# Patient Record
Sex: FEMALE | Race: WHITE | Employment: FULL TIME | URBAN - METROPOLITAN AREA
[De-identification: names, ages, dates, MRNs, and addresses within clinical notes are randomized per-mention and may not be internally consistent; named-entity substitution may affect disease eponyms.]

---

## 2020-03-25 ENCOUNTER — HOSPITAL ENCOUNTER (OUTPATIENT)
Dept: LAB | Age: 50
Discharge: HOME | End: 2020-03-25

## 2020-04-07 ENCOUNTER — HOSPITAL ENCOUNTER (OUTPATIENT)
Dept: PET IMAGING | Age: 50
Discharge: HOME | End: 2020-04-07
Attending: SPECIALIST
Payer: COMMERCIAL

## 2020-04-07 VITALS — WEIGHT: 115 LBS | BODY MASS INDEX: 20.38 KG/M2 | HEIGHT: 63 IN

## 2020-04-07 DIAGNOSIS — R22.1 NECK MASS: ICD-10-CM

## 2020-04-07 PROCEDURE — A9552 F18 FDG: HCPCS

## 2020-04-07 RX ORDER — SODIUM CHLORIDE 0.9 % (FLUSH) 0.9 %
10 SYRINGE (ML) INJECTION
Status: COMPLETED | OUTPATIENT
Start: 2020-04-07 | End: 2020-04-07

## 2020-04-07 RX ADMIN — Medication 10 ML: at 08:42

## 2020-04-11 ENCOUNTER — ANESTHESIA EVENT (OUTPATIENT)
Dept: SURGERY | Age: 50
End: 2020-04-11
Payer: COMMERCIAL

## 2020-04-13 ENCOUNTER — HOSPITAL ENCOUNTER (OUTPATIENT)
Age: 50
Setting detail: OUTPATIENT SURGERY
Discharge: HOME | End: 2020-04-13
Attending: SPECIALIST | Admitting: SPECIALIST
Payer: COMMERCIAL

## 2020-04-13 ENCOUNTER — ANESTHESIA (OUTPATIENT)
Dept: SURGERY | Age: 50
End: 2020-04-13
Payer: COMMERCIAL

## 2020-04-13 VITALS
RESPIRATION RATE: 15 BRPM | OXYGEN SATURATION: 95 % | HEART RATE: 57 BPM | TEMPERATURE: 98 F | WEIGHT: 116.84 LBS | HEIGHT: 63 IN | BODY MASS INDEX: 20.7 KG/M2 | DIASTOLIC BLOOD PRESSURE: 72 MMHG | SYSTOLIC BLOOD PRESSURE: 115 MMHG

## 2020-04-13 PROBLEM — R59.0 ANTERIOR CERVICAL LYMPHADENOPATHY: Status: ACTIVE | Noted: 2020-04-13

## 2020-04-13 PROCEDURE — 88342 IMHCHEM/IMCYTCHM 1ST ANTB: CPT

## 2020-04-13 PROCEDURE — 76210000020 HC REC RM PH II FIRST 0.5 HR: Performed by: SPECIALIST

## 2020-04-13 PROCEDURE — 76060000032 HC ANESTHESIA 0.5 TO 1 HR: Performed by: SPECIALIST

## 2020-04-13 PROCEDURE — 77030026438 HC STYL ET INTUB CARD -A: Performed by: ANESTHESIOLOGY

## 2020-04-13 PROCEDURE — 76210000006 HC OR PH I REC 0.5 TO 1 HR: Performed by: SPECIALIST

## 2020-04-13 PROCEDURE — 76010000138 HC OR TIME 0.5 TO 1 HR: Performed by: SPECIALIST

## 2020-04-13 PROCEDURE — 74011000250 HC RX REV CODE- 250: Performed by: NURSE ANESTHETIST, CERTIFIED REGISTERED

## 2020-04-13 PROCEDURE — 74011250636 HC RX REV CODE- 250/636: Performed by: NURSE ANESTHETIST, CERTIFIED REGISTERED

## 2020-04-13 PROCEDURE — 77030008684 HC TU ET CUF COVD -B: Performed by: ANESTHESIOLOGY

## 2020-04-13 PROCEDURE — 74011250636 HC RX REV CODE- 250/636: Performed by: ANESTHESIOLOGY

## 2020-04-13 PROCEDURE — 88305 TISSUE EXAM BY PATHOLOGIST: CPT

## 2020-04-13 PROCEDURE — 77030018846 HC SOL IRR STRL H20 ICUM -A: Performed by: SPECIALIST

## 2020-04-13 RX ORDER — GLYCOPYRROLATE 0.2 MG/ML
INJECTION INTRAMUSCULAR; INTRAVENOUS AS NEEDED
Status: DISCONTINUED | OUTPATIENT
Start: 2020-04-13 | End: 2020-04-13 | Stop reason: HOSPADM

## 2020-04-13 RX ORDER — MIDAZOLAM HYDROCHLORIDE 1 MG/ML
INJECTION, SOLUTION INTRAMUSCULAR; INTRAVENOUS AS NEEDED
Status: DISCONTINUED | OUTPATIENT
Start: 2020-04-13 | End: 2020-04-13 | Stop reason: HOSPADM

## 2020-04-13 RX ORDER — DIPHENHYDRAMINE HYDROCHLORIDE 50 MG/ML
12.5 INJECTION, SOLUTION INTRAMUSCULAR; INTRAVENOUS AS NEEDED
Status: DISCONTINUED | OUTPATIENT
Start: 2020-04-13 | End: 2020-04-13 | Stop reason: HOSPADM

## 2020-04-13 RX ORDER — LIDOCAINE HYDROCHLORIDE 10 MG/ML
0.1 INJECTION, SOLUTION EPIDURAL; INFILTRATION; INTRACAUDAL; PERINEURAL AS NEEDED
Status: DISCONTINUED | OUTPATIENT
Start: 2020-04-13 | End: 2020-04-13 | Stop reason: HOSPADM

## 2020-04-13 RX ORDER — SODIUM CHLORIDE, SODIUM LACTATE, POTASSIUM CHLORIDE, CALCIUM CHLORIDE 600; 310; 30; 20 MG/100ML; MG/100ML; MG/100ML; MG/100ML
125 INJECTION, SOLUTION INTRAVENOUS CONTINUOUS
Status: DISCONTINUED | OUTPATIENT
Start: 2020-04-13 | End: 2020-04-13 | Stop reason: HOSPADM

## 2020-04-13 RX ORDER — SUCCINYLCHOLINE CHLORIDE 20 MG/ML
INJECTION INTRAMUSCULAR; INTRAVENOUS AS NEEDED
Status: DISCONTINUED | OUTPATIENT
Start: 2020-04-13 | End: 2020-04-13 | Stop reason: HOSPADM

## 2020-04-13 RX ORDER — NEOSTIGMINE METHYLSULFATE 1 MG/ML
INJECTION, SOLUTION INTRAVENOUS AS NEEDED
Status: DISCONTINUED | OUTPATIENT
Start: 2020-04-13 | End: 2020-04-13 | Stop reason: HOSPADM

## 2020-04-13 RX ORDER — ONDANSETRON 2 MG/ML
INJECTION INTRAMUSCULAR; INTRAVENOUS AS NEEDED
Status: DISCONTINUED | OUTPATIENT
Start: 2020-04-13 | End: 2020-04-13 | Stop reason: HOSPADM

## 2020-04-13 RX ORDER — HYDROMORPHONE HYDROCHLORIDE 1 MG/ML
.25-1 INJECTION, SOLUTION INTRAMUSCULAR; INTRAVENOUS; SUBCUTANEOUS
Status: DISCONTINUED | OUTPATIENT
Start: 2020-04-13 | End: 2020-04-13 | Stop reason: HOSPADM

## 2020-04-13 RX ORDER — NALOXONE HYDROCHLORIDE 0.4 MG/ML
0.2 INJECTION, SOLUTION INTRAMUSCULAR; INTRAVENOUS; SUBCUTANEOUS
Status: DISCONTINUED | OUTPATIENT
Start: 2020-04-13 | End: 2020-04-13 | Stop reason: HOSPADM

## 2020-04-13 RX ORDER — DEXAMETHASONE SODIUM PHOSPHATE 4 MG/ML
INJECTION, SOLUTION INTRA-ARTICULAR; INTRALESIONAL; INTRAMUSCULAR; INTRAVENOUS; SOFT TISSUE AS NEEDED
Status: DISCONTINUED | OUTPATIENT
Start: 2020-04-13 | End: 2020-04-13 | Stop reason: HOSPADM

## 2020-04-13 RX ORDER — PROPOFOL 10 MG/ML
INJECTION, EMULSION INTRAVENOUS AS NEEDED
Status: DISCONTINUED | OUTPATIENT
Start: 2020-04-13 | End: 2020-04-13 | Stop reason: HOSPADM

## 2020-04-13 RX ORDER — FLUMAZENIL 0.1 MG/ML
0.2 INJECTION INTRAVENOUS
Status: DISCONTINUED | OUTPATIENT
Start: 2020-04-13 | End: 2020-04-13 | Stop reason: HOSPADM

## 2020-04-13 RX ORDER — LIDOCAINE HYDROCHLORIDE 20 MG/ML
INJECTION, SOLUTION EPIDURAL; INFILTRATION; INTRACAUDAL; PERINEURAL AS NEEDED
Status: DISCONTINUED | OUTPATIENT
Start: 2020-04-13 | End: 2020-04-13 | Stop reason: HOSPADM

## 2020-04-13 RX ORDER — ROCURONIUM BROMIDE 10 MG/ML
INJECTION, SOLUTION INTRAVENOUS AS NEEDED
Status: DISCONTINUED | OUTPATIENT
Start: 2020-04-13 | End: 2020-04-13 | Stop reason: HOSPADM

## 2020-04-13 RX ORDER — FENTANYL CITRATE 50 UG/ML
INJECTION, SOLUTION INTRAMUSCULAR; INTRAVENOUS AS NEEDED
Status: DISCONTINUED | OUTPATIENT
Start: 2020-04-13 | End: 2020-04-13 | Stop reason: HOSPADM

## 2020-04-13 RX ADMIN — SUCCINYLCHOLINE CHLORIDE 100 MG: 20 INJECTION, SOLUTION INTRAMUSCULAR; INTRAVENOUS; PARENTERAL at 07:47

## 2020-04-13 RX ADMIN — ROCURONIUM BROMIDE 5 MG: 50 INJECTION, SOLUTION INTRAVENOUS at 07:47

## 2020-04-13 RX ADMIN — MIDAZOLAM HYDROCHLORIDE 2 MG: 2 INJECTION, SOLUTION INTRAMUSCULAR; INTRAVENOUS at 07:40

## 2020-04-13 RX ADMIN — FENTANYL CITRATE 50 MCG: 50 INJECTION, SOLUTION INTRAMUSCULAR; INTRAVENOUS at 07:44

## 2020-04-13 RX ADMIN — ROCURONIUM BROMIDE 15 MG: 50 INJECTION, SOLUTION INTRAVENOUS at 07:53

## 2020-04-13 RX ADMIN — FENTANYL CITRATE 50 MCG: 50 INJECTION, SOLUTION INTRAMUSCULAR; INTRAVENOUS at 07:40

## 2020-04-13 RX ADMIN — ONDANSETRON 4 MG: 2 INJECTION INTRAMUSCULAR; INTRAVENOUS at 08:03

## 2020-04-13 RX ADMIN — LIDOCAINE HYDROCHLORIDE 40 MG: 20 INJECTION, SOLUTION INTRAVENOUS at 07:47

## 2020-04-13 RX ADMIN — DEXAMETHASONE SODIUM PHOSPHATE 8 MG: 4 INJECTION, SOLUTION INTRAMUSCULAR; INTRAVENOUS at 08:03

## 2020-04-13 RX ADMIN — SODIUM CHLORIDE, SODIUM LACTATE, POTASSIUM CHLORIDE, AND CALCIUM CHLORIDE: 600; 310; 30; 20 INJECTION, SOLUTION INTRAVENOUS at 08:21

## 2020-04-13 RX ADMIN — PROPOFOL 150 MG: 10 INJECTION, EMULSION INTRAVENOUS at 07:46

## 2020-04-13 RX ADMIN — SODIUM CHLORIDE, SODIUM LACTATE, POTASSIUM CHLORIDE, AND CALCIUM CHLORIDE 125 ML/HR: 600; 310; 30; 20 INJECTION, SOLUTION INTRAVENOUS at 06:27

## 2020-04-13 RX ADMIN — GLYCOPYRROLATE 0.4 MG: 0.2 INJECTION, SOLUTION INTRAMUSCULAR; INTRAVENOUS at 08:16

## 2020-04-13 RX ADMIN — Medication 3 MG: at 08:16

## 2020-04-13 NOTE — DISCHARGE INSTRUCTIONS
Endoscopy Postoperative Instructions      Most patient have very little pain or discomfort from this procedure unless biopsies were taken. IIf this is the case, usually one starts feeling better by day 4 or 5. Below are some important tips to make the recovery as smooth as possible. Never hesitate to call your physician's office if any questions or concerns arise. 1. Drink plenty of fluids. The most important requirement for the patient is to drink plenty of fluids. Milk products should generally be avoided for the first 24 hrs following anesthesia as they may upset the stomach. Water, juice, or Gatorade and soft drinks are generally well tolerated. Avoid citric drinks such as orange or grapefruit juice as they may irritate the throat and increase discomfort. Signs of dehydration include decreased urination (less than 2-3 times per day), and/or dry mouth. Please contact your physician if any concerns arise. 2. Eat soothing foods. For the first 2-3 days after surgery, avoid eating crunchy, coarse, or scratchy foods such as toast, crackers, cookies, chips and pretzels because they may scratch the throat and cause bleeding. Foods that are easy on the throat include applesauce, yogurt, cooked cereals, broths or soups, mashed potatoes and soft fruits. Cold foods such as popsicles, italian ice or frozen yogurt may also be soothing to the throat. Avoid very hot, spicy or acidic foods (ie, tomato sauce, orange juice). For adults, chewing gum can be helpful to alleviate muscle tightness in the jaw and face. 3. Fever. A low-grade fever is not uncommon for several days following a throat surgery. Call your physician if a fever greater than 102 degrees is present over several hours. 4. Bleeding. Small specks of blood or blood-tinged saliva may be seen for several days following the procedure. 5. Pain.   Pain is often mild to moderate for a few days following surgery and is often dependent of the extent of surgery. Expect throat pain to resolve about 4-6 days following surgery. The pain may also be present in the ears, jaw, tongue and neck. Prescription pain medication may be prescribed. If the pain is mild to moderate, acetaminophen (Tylenol) or ibuprofen (Advil, Motrin) may be used instead of the prescription pain medication (if prescribed). Some mild discomfort or pain upon yawning may be seen for several weeks following the procedure. In many cases, aspirin should be avoided for about 5 days after the procedure. 6. Activity. Rest is recommended for the first few days, followed by slowly increased activity. If diet has resumed to near-normal and pain medication is no longer necessary, the patient may return to work or school. Depending on surgery, you may be asked to adhere to voice rest for several days. Discuss this with your surgeon. 7. Gargle with warm salt water. Gargling several times a day with warm salt water may be helpful. Mix 8 ounces of warm water with about 1/4 teaspoon of table salt. 8. Use a humidifier. Adding moisture to the air can be helpful. Keeping the mucous membranes moist can help ease the discomfort following throat surgery. A cool mist humidifier strategically placed near the bedside is ideal.    9. Stay away from irritants. Try to avoid potential irritants such as cigarette smoke, fumes from paints or other chemicals which may not only increase the pain but also delay healing. 10. Miscellaneous. The tongue or uvula may be swollen, red or \"bumpy\" for several days. Also, the voice may be different for a few weeks. Snoring may actually be worse for a week or two until the swelling goes down. Finally, there is often a mild to moderate taste disturbance for a few weeks following the procedure. This generally improves and long-term taste problems are very rare. 11. Follow up.   A follow up appointment should be arranged following surgery. Your surgeon will specify the approximate follow up date. If any problems or concerns arise before that time, please contact the office at [phone].      Debbie Marrero [address]   T: [phone]   Ricardo Martinez: [phone]  [email or web address]         DISCHARGE SUMMARY from your Nurse    The following personal items collected during your admission are returned to you:   Dental Appliance: Dental Appliances: None  Vision: Visual Aid: Glasses  Hearing Aid:    Jewelry: Jewelry: None  Clothing: Clothing: Pants, Undergarments, Shirt, Jacket/Coat, Footwear  Other Valuables: Other Valuables: None  Valuables sent to safe:      PATIENT INSTRUCTIONS:    After general anesthesia or intravenous sedation, for 24 hours or while taking prescription Narcotics:  · Limit your activities  · Do not drive and operate hazardous machinery  · Do not make important personal or business decisions  · Do  not drink alcoholic beverages  · If you have not urinated within 8 hours after discharge, please contact your surgeon on call. Report the following to your surgeon:  · Excessive pain, swelling, redness or odor of or around the surgical area  · Temperature over 100.5  · Nausea and vomiting lasting longer than 4 hours or if unable to take medications  · Any signs of decreased circulation or nerve impairment to extremity: change in color, persistent  numbness, tingling, coldness or increase pain  · Any questions    COUGH AND DEEP BREATHE    Breathing deep and coughing are very important exercises to do after surgery. Deep breathing and coughing open the little air tubes and air sacks in your lungs. You take deep breaths every day. You may not even notice - it is just something you do when you sigh or yawn. It is a natural exercise you do to keep these air passages open. After surgery, take deep breaths and cough, on purpose.       Coughing and deep breathing help prevent bronchitis and pneumonia after surgery. If you had chest or belly surgery, use a pillow as a \"hug stephenie\" and hold it tightly to your chest or belly when you cough. DIRECTIONS:  · Take 10 to 15 slow deep breaths every hour while awake. · Breathe in deeply, and hold it for 2 seconds. · Exhale slowly through puckered lips, like blowing up a balloon. · After every 4th or 5th deep breath, hug your pillow to your chest or belly and give a hard, deep cough. Yes, it will probably hurt. But doing this exercise is very important part of healing after surgery. Take your pain medicine to help you do this exercise without too much pain. IF YOU HAVE BEEN DIAGNOSED WITH SLEEP APNEA, PLEASE USE YOUR SLEEP APNEA DEVICE OR CPAP MACHINE WHEN YOU INTEND TO NAP AFTER TAKING PAIN MEDICATION. Ankle Pumps    Ankle pumps increase the circulation of oxygenated blood to your lower extremities and decrease your risk for circulation problems such as blood clots. They also stretch the muscles, tendons and ligaments in your foot and ankle, and prevent joint contracture in the ankle and foot, especially after surgeries on the legs. It is important to do ankle pump exercises regularly after surgery because immobility increases your risk for developing a blood clot. Your doctor may also have you take an Aspirin for the next few days as well. If your doctor did not ask you to take an Aspirin, consult with him before starting Aspirin therapy on your own. Slowly point your foot forward, feeling the muscles on the top of your lower leg stretch, and hold this position for 5 seconds. Next, pull your foot back toward you as far as possible, stretching the calf muscles, and hold that position for 5 seconds. Repeat with the other foot. Perform 10 repetitions every hour while awake for both ankles if possible (down and then up with the foot once is one repetition).     You should feel gentle stretching of the muscles in your lower leg when doing this exercise. If you feel pain, or your range of motion is limited, don't  Push too hard. Only go the limit your joint and muscles will let you go. If you have increasing pain, progressively worsening leg warmth or swelling, STOP the exercise and call your doctor. Below is information about the medications your doctor is prescribing after your visit:    Other information in your discharge envelope:  []     PRESCRIPTIONS  []     SCHOOL/WORK NOTE  [x]     INFECTION PREVENTION  []     [address]  []     REGIONAL NERVE BLOCK ON QUE PAMPHLET   []     EXPAREL  []     HANDICAP APPLICATION         These are general instructions for a healthy lifestyle:    *  Please give a list of your current medications to your Primary Care Provider. *  Please update this list whenever your medications are discontinued, doses are      changed, or new medications (including over-the-counter products) are added. *  Please carry medication information at all times in case of emergency situations. About Smoking  No smoking / No tobacco products / Avoid exposure to second hand smoke    Surgeon General's Warning:  Quitting smoking now greatly reduces serious risk to your health. Obesity, smoking, and sedentary lifestyle greatly increases your risk for illness and disease. A healthy diet, regular physical exercise & weight monitoring are important for maintaining a healthy lifestyle. Congestive Heart Failure  You may be retaining fluid if you have a history of heart failure or if you experience any of the following symptoms:  Weight gain of 3 pounds or more overnight or 5 pounds in a week, increased swelling in our hands or feet or shortness of breath while lying flat in bed. Please call your doctor as soon as you notice any of these symptoms; do not wait until your next office visit.     Recognize signs and symptoms of STROKE:  F - face looks uneven  A - arms unable to move or move even  S - speech slurred or non-existent  T - time-call 911 as soon as signs and symptoms begin-DO NOT go         Back to bed or wait to see if you get better-TIME IS BRAIN. Warning signs of HEART ATTACK  Call 911 if you have these symptoms    · Chest discomfort. Most heart attacks involve discomfort in the center of the chest that lasts more than a few minutes, or that goes away and comes back. It can feel like uncomfortable pressure, squeezing, fullness, or pain. · Discomfort in other areas of the upper body. Symptoms can include pain or discomfort in one or both        Arms, the back, neck, jaw, or stomach. ·  Shortness of breath with or without chest discomfort. · Other signs may include breaking out in a cold sweat, nausea, or lightheadedness    Don't wait more than five minutes to call 911 - MINUTES MATTER! Fast action can save your life. Calling 911 is almost always the fastest way to get lifesaving treatment. Emergency Medical Services staff can begin treatment when they arrive - up to an hour sooner than if someone gets to the hospital by car. ERIKA COMER MEDICATION AND SIDE EFFECT GUIDE    The [facility] MEDICATION AND SIDE EFFECT GUIDE was provided to the PATIENT AND CARE PROVIDER.   Information provided includes instruction about drug purpose and common side effects for the following medications:    · No prescriptions given

## 2020-04-13 NOTE — OP NOTES
[facility] 81  OPERATIVE REPORT    Name:  Laurence Senior  MR#:  [id number]  :  1970  ACCOUNT #:  [de-identified]  DATE OF SERVICE:  2020    PREOPERATIVE DIAGNOSIS:  Right neck squamous cell carcinoma, unknown primary. POSTOPERATIVE DIAGNOSIS:  Right neck squamous cell carcinoma, unknown primary. PROCEDURE PERFORMED:  1. Direct laryngoscopy with right lateral tongue base biopsies. 2.  Diagnostic esophagoscopy. SURGEON:  Chika Clemens MD    ASSISTANT:  None. ANESTHESIA:  General endotracheal.    COMPLICATIONS:  None. SPECIMENS REMOVED:  Right lateral tongue base biopsies. IMPLANTS:  None. ESTIMATED BLOOD LOSS:  Less than 5 mL. DRAINS:  None. INDICATIONS FOR SURGERY:  The patient is a 68-year-old female with an enlarged right level II cervical lymph node. A recent fine-needle aspiration biopsy was suggestive of squamous cell carcinoma. A PET/CT was obtained last week which demonstrated increased uptake in the right level II cervical lymph node as well as the right lateral tongue base. Following discussion regarding the management options, decision was made to proceed with direct laryngoscopy with biopsies as well as esophagoscopy. OPERATIVE FINDINGS:  There was some friable tissue in the right lateral tongue base without a well-defined mass. Multiple biopsies of this abnormal tissue was obtained without complication. Bleeding was minimal.  The tongue base was palpated and felt to be rather soft. The vallecula appeared normal.  The epiglottis was normal.  The hypopharynx and larynx were normal.  The esophagus was negative to a distance of approximately 35 cm. Surgical pathology is pending. DESCRIPTION OF PROCEDURE:  The patient was met in the preoperative area where the procedure was discussed in detail and an operative permit was obtained.   She was then transferred to the operating room where she was placed in a supine position on the operating table. General anesthesia was commenced and she was orotracheally intubated without difficulty. The table was rotated 90 degrees. She was positioned in the standard fashion for direct laryngoscopy and esophagoscopy. A surgical time-out was performed. A tooth guard was placed to protect the upper dentition. A rigid Dedo laryngoscope was inserted and the oropharynx, hypopharynx, and larynx were closely inspected. The findings were as aforementioned. The laryngoscope was then fully removed. A rigid esophagoscope was passed to a distance of approximately 35 cm. There was no resistance and the esophagus was visualized without difficulty. The esophagoscope was then slowly removed and all mucosal surface of the esophagus appeared to be normal.  There were no visible lesions. No biopsies were obtained. The esophagoscope was then fully removed. The laryngoscope was reinserted and our attention was directed towards the right lateral tongue base. Some abnormal-appearing tissue was encountered with slight friability and bleeding. Multiple biopsies of this region were obtained using an upbiting laryngeal cupped forceps. The specimen was obtained and sent to Pathology for permanent histopathology. The laryngoscope was then fully removed. The tongue base was palpated and appeared to be normal.    The tooth guard was removed. The drapes were removed as well. The table was returned to its original position. She was awakened and extubated without event. She was safely transferred to the [address] [place] [address] Unit. There were no intraoperative complications.       Winnie Albright MD      [id number]  D:  04/13/2020 8:28  T:  04/13/2020 9:23  JOB #:  [id number]

## 2020-04-13 NOTE — ANESTHESIA POSTPROCEDURE EVALUATION
Procedure(s):  DIRECT LARYNGOSCOPY WITH BIOPSY, ESOPHAGOSCOPY (ESSENTIAL). general    Anesthesia Post Evaluation      Multimodal analgesia: multimodal analgesia used between 6 hours prior to anesthesia start to PACU discharge  Patient location during evaluation: bedside  Patient participation: complete - patient participated  Level of consciousness: awake  Pain management: adequate  Airway patency: patent  Anesthetic complications: no  Cardiovascular status: acceptable  Respiratory status: acceptable  Hydration status: acceptable        Vitals Value Taken Time   /72 4/13/2020  9:01 AM   Temp 36.7 °C (98 °F) 4/13/2020  9:01 AM   Pulse 55 4/13/2020  9:02 AM   Resp 14 4/13/2020  9:02 AM   SpO2 94 % 4/13/2020  9:02 AM   Vitals shown include unvalidated device data.

## 2020-04-13 NOTE — BRIEF OP NOTE
Brief Postoperative Note    Patient: Mihaela Phoenix  YOB: 1970  MRN: [id number]    Date of Procedure: 4/13/2020     Pre-Op Diagnosis: SECONDARY AND UNSPECIFIED MALIGNANT NEOPLASM OF LYMPH NODES OF HEAD, FACE AND NECK    Post-Op Diagnosis: Same as preoperative diagnosis. Procedure(s):  DIRECT LARYNGOSCOPY WITH BIOPSY, ESOPHAGOSCOPY (ESSENTIAL)    Surgeon(s):  Yoselyn Garrett MD    Surgical Assistant: None    Anesthesia: General     Estimated Blood Loss (mL): Minimal    Complications: None    Specimens:   ID Type Source Tests Collected by Time Destination   1 : right tongue base biopsy Preservative Tongue  Yoselyn Garrett MD 4/13/2020 4002 Pathology        Implants: * No implants in log *    Drains: * No LDAs found *    Findings: Friable, abnormal-appearing tissue right lateral tongue base, focal. No easily discernible mass. Multiple biopsies were obtained.      Electronically Signed by Khanh Aguilar MD on 4/13/2020 at 8:28 AM

## 2020-04-13 NOTE — ANESTHESIA PREPROCEDURE EVALUATION
Relevant Problems   No relevant active problems       Anesthetic History               Review of Systems / Medical History  Patient summary reviewed and pertinent labs reviewed    Pulmonary          Smoker         Neuro/Psych         Psychiatric history     Cardiovascular  Within defined limits                Exercise tolerance: >4 METS     GI/Hepatic/Renal  Within defined limits              Endo/Other        Cancer (head/neck cancer )     Other Findings              Physical Exam    Airway  Mallampati: II  TM Distance: 4 - 6 cm  Neck ROM: normal range of motion   Mouth opening: Normal     Cardiovascular    Rhythm: regular  Rate: normal         Dental      Comments: Chipped front teeth   Pulmonary  Breath sounds clear to auscultation               Abdominal         Other Findings            Anesthetic Plan    ASA: 2  Anesthesia type: general          Induction: Intravenous  Anesthetic plan and risks discussed with: Patient

## 2020-04-13 NOTE — H&P
Otolaryngology - Head & Neck Surgery    Subjective:     History of Present Illness:   Kim Gimenez is a 52 y.o. female with right neck mass FNA suggestive of SCCA. PET CT showed region of increased uptake in right tongue base as well as intense uptake in right level II node. She presents today for laryngoscopy with biopsy. Past Medical History:   Diagnosis Date    Cancer ([address]     rt side neck lymph node    Fainting spell       Past Surgical History:   Procedure Laterality Date    ABDOMEN SURGERY PROC UNLISTED      HX  SECTION        Family History   Problem Relation Age of Onset    Diabetes Mother     Heart Disease Mother     Cancer Father         bone    Hypertension Father       Social History     Tobacco Use    Smoking status: Current Every Day Smoker     Packs/day: 1.00     Years: 23.00     Pack years: 23.00     Types: Cigarettes    Smokeless tobacco: Never Used   Substance Use Topics    Alcohol use: Yes     Alcohol/week: 1.0 standard drinks     Types: 1 Cans of beer per week     Comment: occassionally     Allergies   Allergen Reactions    Tylenol-Codeine #3 [Acetaminophen-Codeine] Nausea Only     Prior to Admission medications    Medication Sig Start Date End Date Taking? Authorizing Provider   citalopram (CELEXA) 20 mg tablet Take  by mouth daily. Yes Provider, Historical        Review of Systems    Objective:     Patient Vitals for the past 8 hrs:   BP Temp Pulse Resp SpO2 Height Weight   20 0558 111/64 97.7 °F (36.5 °C) 65 18 97 % -- --   20 0551 -- -- -- -- -- 5' 3\" (1.6 m) 53 kg (116 lb 13.5 oz)     Temp (24hrs), Av.7 °F (36.5 °C), Min:97.7 °F (36.5 °C), Max:97.7 °F (36.5 °C)    No intake/output data recorded. Physical Exam     General - NAD, normal voice. Neck - 2 cm right level II node. Oral - no visible lesions. CV - lungs clear. RRR. There were no additional components assessed.     Assessment:     Right cervical adenopathy (level II) with FNA suggestive of squamous cell carcinoma. Plan: Will proceed with direct laryngoscopy with biopsy, esophagoscopy. Risks and benefits discussed, consent obtained.     Signed By:  Dia Rios MD     April 13, 2020

## 2020-04-17 ENCOUNTER — HOSPITAL ENCOUNTER (OUTPATIENT)
Dept: RADIATION THERAPY | Age: 50
Discharge: HOME | End: 2020-04-17

## 2020-04-23 ENCOUNTER — DOCUMENTATION ONLY (OUTPATIENT)
Dept: ONCOLOGY | Age: 50
End: 2020-04-23

## 2020-04-23 ENCOUNTER — HOSPITAL ENCOUNTER (OUTPATIENT)
Dept: LAB | Age: 50
Discharge: HOME | End: 2020-04-23

## 2020-04-23 ENCOUNTER — OFFICE VISIT (OUTPATIENT)
Dept: ONCOLOGY | Age: 50
End: 2020-04-23

## 2020-04-23 VITALS
TEMPERATURE: 96.9 F | HEART RATE: 70 BPM | HEIGHT: 63 IN | WEIGHT: 119 LBS | DIASTOLIC BLOOD PRESSURE: 69 MMHG | OXYGEN SATURATION: 96 % | RESPIRATION RATE: 14 BRPM | BODY MASS INDEX: 21.09 KG/M2 | SYSTOLIC BLOOD PRESSURE: 110 MMHG

## 2020-04-23 DIAGNOSIS — C01 SQUAMOUS CELL CARCINOMA OF BASE OF TONGUE (HCC): ICD-10-CM

## 2020-04-23 DIAGNOSIS — C01 SQUAMOUS CELL CARCINOMA OF BASE OF TONGUE (HCC): Primary | ICD-10-CM

## 2020-04-23 LAB
ALBUMIN SERPL-MCNC: 4 G/DL (ref 3.5–5)
ALBUMIN/GLOB SERPL: 1.2 {RATIO} (ref 1.1–2.2)
ALP SERPL-CCNC: 99 U/L (ref 45–117)
ALT SERPL-CCNC: 24 U/L (ref 12–78)
ANION GAP SERPL CALC-SCNC: 4 MMOL/L (ref 5–15)
AST SERPL-CCNC: 18 U/L (ref 15–37)
BASOPHILS # BLD: 0 K/UL (ref 0–0.1)
BASOPHILS NFR BLD: 0 % (ref 0–1)
BILIRUB SERPL-MCNC: 0.3 MG/DL (ref 0.2–1)
BUN SERPL-MCNC: 15 MG/DL (ref 6–20)
BUN/CREAT SERPL: 20 (ref 12–20)
CALCIUM SERPL-MCNC: 10.1 MG/DL (ref 8.5–10.1)
CHLORIDE SERPL-SCNC: 104 MMOL/L (ref 97–108)
CO2 SERPL-SCNC: 31 MMOL/L (ref 21–32)
CREAT SERPL-MCNC: 0.76 MG/DL (ref 0.55–1.02)
DIFFERENTIAL METHOD BLD: NORMAL
EOSINOPHIL # BLD: 0.1 K/UL (ref 0–0.4)
EOSINOPHIL NFR BLD: 1 % (ref 0–7)
ERYTHROCYTE [DISTWIDTH] IN BLOOD BY AUTOMATED COUNT: 13.7 % (ref 11.5–14.5)
GLOBULIN SER CALC-MCNC: 3.4 G/DL (ref 2–4)
GLUCOSE SERPL-MCNC: 81 MG/DL (ref 65–100)
HCT VFR BLD AUTO: 43 % (ref 35–47)
HGB BLD-MCNC: 13.9 G/DL (ref 11.5–16)
IMM GRANULOCYTES # BLD AUTO: 0 K/UL (ref 0–0.04)
IMM GRANULOCYTES NFR BLD AUTO: 0 % (ref 0–0.5)
LYMPHOCYTES # BLD: 1.9 K/UL (ref 0.8–3.5)
LYMPHOCYTES NFR BLD: 27 % (ref 12–49)
MCH RBC QN AUTO: 29.6 PG (ref 26–34)
MCHC RBC AUTO-ENTMCNC: 32.3 G/DL (ref 30–36.5)
MCV RBC AUTO: 91.5 FL (ref 80–99)
MONOCYTES # BLD: 0.6 K/UL (ref 0–1)
MONOCYTES NFR BLD: 9 % (ref 5–13)
NEUTS SEG # BLD: 4.5 K/UL (ref 1.8–8)
NEUTS SEG NFR BLD: 63 % (ref 32–75)
NRBC # BLD: 0 K/UL (ref 0–0.01)
NRBC BLD-RTO: 0 PER 100 WBC
PLATELET # BLD AUTO: 303 K/UL (ref 150–400)
PMV BLD AUTO: 11.1 FL (ref 8.9–12.9)
POTASSIUM SERPL-SCNC: 4.5 MMOL/L (ref 3.5–5.1)
PROT SERPL-MCNC: 7.4 G/DL (ref 6.4–8.2)
RBC # BLD AUTO: 4.7 M/UL (ref 3.8–5.2)
SODIUM SERPL-SCNC: 139 MMOL/L (ref 136–145)
WBC # BLD AUTO: 7.1 K/UL (ref 3.6–11)

## 2020-04-23 RX ORDER — ACETAMINOPHEN 500 MG
TABLET ORAL 2 TIMES DAILY
COMMUNITY
End: 2020-06-09 | Stop reason: ALTCHOICE

## 2020-04-23 RX ORDER — PYRIDOXINE HCL (VITAMIN B6) 100 MG
100 TABLET ORAL DAILY
COMMUNITY
End: 2020-06-09 | Stop reason: ALTCHOICE

## 2020-04-23 RX ORDER — LIDOCAINE AND PRILOCAINE 25; 25 MG/G; MG/G
CREAM TOPICAL AS NEEDED
Qty: 30 G | Refills: 0 | Status: SHIPPED | OUTPATIENT
Start: 2020-04-23 | End: 2021-05-28 | Stop reason: ALTCHOICE

## 2020-04-23 RX ORDER — VITAMIN E CAP 100 UNIT 100 UNIT
CAP ORAL DAILY
COMMUNITY
End: 2020-06-09 | Stop reason: ALTCHOICE

## 2020-04-23 RX ORDER — ASCORBIC ACID 250 MG
TABLET ORAL
COMMUNITY
End: 2020-06-09 | Stop reason: ALTCHOICE

## 2020-04-23 RX ORDER — ONDANSETRON HYDROCHLORIDE 8 MG/1
8 TABLET, FILM COATED ORAL
Qty: 45 TAB | Refills: 5 | Status: SHIPPED | OUTPATIENT
Start: 2020-04-23 | End: 2020-04-30 | Stop reason: SDUPTHER

## 2020-04-23 RX ORDER — ZINC GLUCONATE 10 MG
LOZENGE ORAL
COMMUNITY
End: 2020-06-09 | Stop reason: ALTCHOICE

## 2020-04-23 RX ORDER — PROCHLORPERAZINE MALEATE 10 MG
10 TABLET ORAL
Qty: 50 TAB | Refills: 5 | Status: SHIPPED | OUTPATIENT
Start: 2020-04-23 | End: 2020-04-29 | Stop reason: SDUPTHER

## 2020-04-23 NOTE — PROGRESS NOTES
[facility] at [facility]  [phone]      Chemotherapy education folder given to patient. Consent form reviewed and signed by patient and provider.

## 2020-04-23 NOTE — PROGRESS NOTES
[facility] [place] [facility] [place] [address] [address] [facility] [address] [facility]  Salome Gals: [phone]  F: [phone]      Reason for Visit:   Jose Alberto Nur is a 52 y.o. female who is seen in consultation at the request of Dr. Ruben Garner for evaluation of oropharyngeal cancer. Treatment History:   · Biopsy of neck mass 3/25/2020: squamous cell carcinoma  · PET/CT 2/1/4260: Hypermetabolic right level 2 cervical lymph node likely corresponds to the biopsy proven squamous cell carcinoma. Focal increased tracer activity at the right tongue base/right vallecula is suspicious for primary malignancy. No evidence of distant metastatic disease. · Direct laryngoscopy by Dr. Claribel Arriola 4/13/2020: Nnamdi Amin tissue in right lateral tongue base without well-defined mass. Biopsies of tongue base with invasive squamous cell carcinoma, moderately differentiated, p16+. 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 female who presents today for evaluation of oropharyngeal cancer. She first noticed a mass in her right neck around 2/2020, along with a feeling of both ears being \"clogged up. \"  She was treated with a course of abx without improvement. She was seen by Dr. Claribel Arriola (ENT) who palpated a right cervical node, with biopsy confirming squamous cell carcinoma. Direct laryngoscopy identified a base of tongue primary, also noted on PET/CT. She has seen Dr. Ruben Garner (radiation oncology) who is planning definitive radiation therapy. She is here today for further evaluation and consideration of concurrent chemotherapy. She saw her dentist earlier this week and they are planning to pull several teeth and fill some cavities, scheduled for early May. She is a smoker, currently 1ppd for about 30 years. She is unaccompanied today.   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 She lives in [place] with her . Past Medical History:   Diagnosis Date    Cancer ([address]     rt side neck lymph node    Fainting spell       Past Surgical History:   Procedure Laterality Date    ABDOMEN SURGERY PROC UNLISTED      HX  SECTION        Social History     Tobacco Use    Smoking status: Current Every Day Smoker     Packs/day: 1.00     Years: 23.00     Pack years: 23.00     Types: Cigarettes    Smokeless tobacco: Never Used   Substance Use Topics    Alcohol use: Yes     Alcohol/week: 1.0 standard drinks     Types: 1 Cans of beer per week     Comment: occassionally      Family History   Problem Relation Age of Onset    Diabetes Mother     Heart Disease Mother     Cancer Father         bone    Hypertension Father      Current Outpatient Medications   Medication Sig    vitamin e (E GEMS) 100 unit capsule Take  by mouth daily.  pyridoxine, vitamin B6, (Vitamin B-6) 100 mg tablet Take 100 mg by mouth daily.  cholecalciferol (VITAMIN D3) (2,000 UNITS /50 MCG) cap capsule Take  by mouth two (2) times a day.  magnesium 250 mg tab Take  by mouth.  ascorbic acid, vitamin C, (Vitamin C) 250 mg tablet Take  by mouth.  prochlorperazine (Compazine) 10 mg tablet Take 1 Tab by mouth every six (6) hours as needed for Nausea.  lidocaine-prilocaine (EMLA) topical cream Apply  to affected area as needed for Pain (Apply 30-60 min. prior to having your port accessed).  ondansetron hcl (ZOFRAN) 8 mg tablet Take 1 Tab by mouth every eight (8) hours as needed for Nausea.  citalopram (CELEXA) 20 mg tablet Take  by mouth daily. No current facility-administered medications for this visit. Allergies   Allergen Reactions    Tylenol-Codeine #3 [Acetaminophen-Codeine] Nausea Only        Review of Systems: A complete review of systems was obtained, negative except as described above.     Physical Exam:     Visit Vitals  /69 (BP 1 Location: Right arm, BP Patient Position: Sitting)   Pulse 70   Temp 96.9 °F (36.1 °C) (Temporal)   Resp 14   Ht 5' 3\" (1.6 m)   Wt 119 lb (54 kg)   SpO2 96%   BMI 21.08 kg/m²     ECOG PS: 0  General: No distress  Eyes: PERRLA, anicteric sclerae  HENT: Atraumatic, OP clear  Neck: Supple  Lymphatic: No cervical, supraclavicular, or inguinal adenopathy  Respiratory: CTAB, normal respiratory effort  CV: Normal rate, regular rhythm, no murmurs, no peripheral edema  GI: Soft, nontender, nondistended, no masses, no hepatomegaly, no splenomegaly  MS: Normal gait and station. Digits without clubbing or cyanosis. Skin: No rashes, ecchymoses, or petechiae. Normal temperature, turgor, and texture. Psych: Alert, oriented, appropriate affect, normal judgment/insight    Results:     Records reviewed and summarized above. Pathology report(s) reviewed above. Radiology report(s) reviewed above. Assessment:   1) Squamous cell carcinoma of the right base of tongue  Stage I, p16+  She has biopsy proven disease in her base of tongue and right cervical node. Images personally reviewed with radiologist, node is <2cm. She has Stage I disease based on the assumption that her cancer is largely driven by HPV, which portends a more favorable prognosis. However, she is also a smoker and this could be driving her disease as well. The cooresponding HPV negative stage would be Stage III. NCCN guidelines recommend either radiation or concurrent chemoradiation for these patients. As she is young and fit, and given the smoking history and clinical equipoise, I have offered her the option of concurrent chemotherapy, with the understanding that the benefit may be modest.  My recommendation is for weekly cisplatin 40mg/m2. We discussed the risks and benefits of cisplatin chemotherapy.  Potential side effects include, but are not limited to, nausea, vomiting, diarrhea, taste changes, myelosuppression, infection, fatigue, alopecia, neuropathy, hearing loss, renal failure, allergic reactions, infertility, and rarely, death. Additionally, chemotherapy leads to radiosensitization which can intensify the side effects of radiation therapy. The patient has consented to beginning chemotherapy. She would benefit from port placement prior to starting therapy. She will also need some baseline labs to ensure her renal function is adequate for chemotherapy. She has met with her dentist and will need some dental work done before starting therapy    She is scheduled for a swallow study on 4/27, and she will be meeting with GI at some point to discuss potential need for PEG in the future. I will ask the cancer center dietician to reach out to her as well. 2) Tobacco abuse  I counseled her on the importance of cessation. Plan:     · Labs today: CBC, CMP  · Port placement with IR  · Swallow study scheduled for 4/27  · Dental eval ongoing  · GI eval pending  · Dietician to reach out to patient by phone  Proceed with radiation planning with Dr. Patrick Guzman  Tentative plan to proceed with C#1 of Cisplatin (40mg/m2), given every week concurrently with radiation. Labs before each treatment: CBC, BMP, Mag  Antiemetic prophylaxis: Ondansetron, Dexamethasone, Fosaprepitant prior to each chemotherapy. Dexamethasone for three days after therapy at home. PRN antiemetics at home: Ondansetron, Prochlorperazine, Lorazepam  Encouraged patient to push fluids (2L+ per day)  Weekly labs during therapy, with IVFs 1-2x/week  ·  to meet with patient today, provide resources and counseling  Return to see me with the start of therapy      I appreciate the opportunity to participate in Ms. Urmila Dickinson's care.     Signed By: Wendy Sun MD

## 2020-04-24 ENCOUNTER — TELEPHONE (OUTPATIENT)
Dept: ONCOLOGY | Age: 50
End: 2020-04-24

## 2020-04-24 RX ORDER — SODIUM CHLORIDE 0.9 % (FLUSH) 0.9 %
10 SYRINGE (ML) INJECTION AS NEEDED
Status: CANCELLED
Start: 2020-06-16

## 2020-04-24 RX ORDER — DIPHENHYDRAMINE HYDROCHLORIDE 50 MG/ML
25 INJECTION, SOLUTION INTRAMUSCULAR; INTRAVENOUS AS NEEDED
Status: CANCELLED
Start: 2020-07-09

## 2020-04-24 RX ORDER — ONDANSETRON 2 MG/ML
8 INJECTION INTRAMUSCULAR; INTRAVENOUS AS NEEDED
Status: CANCELLED | OUTPATIENT
Start: 2020-07-09

## 2020-04-24 RX ORDER — HEPARIN 100 UNIT/ML
300-500 SYRINGE INTRAVENOUS AS NEEDED
Status: CANCELLED
Start: 2020-06-30

## 2020-04-24 RX ORDER — ONDANSETRON 2 MG/ML
8 INJECTION INTRAMUSCULAR; INTRAVENOUS AS NEEDED
Status: CANCELLED | OUTPATIENT
Start: 2020-06-30

## 2020-04-24 RX ORDER — HEPARIN 100 UNIT/ML
300-500 SYRINGE INTRAVENOUS AS NEEDED
Status: CANCELLED
Start: 2020-07-09

## 2020-04-24 RX ORDER — ONDANSETRON 2 MG/ML
8 INJECTION INTRAMUSCULAR; INTRAVENOUS AS NEEDED
Status: CANCELLED | OUTPATIENT
Start: 2020-07-16

## 2020-04-24 RX ORDER — SODIUM CHLORIDE 9 MG/ML
25 INJECTION, SOLUTION INTRAVENOUS CONTINUOUS
Status: CANCELLED | OUTPATIENT
Start: 2020-07-30

## 2020-04-24 RX ORDER — ACETAMINOPHEN 325 MG/1
650 TABLET ORAL AS NEEDED
Status: CANCELLED
Start: 2020-06-16

## 2020-04-24 RX ORDER — ALBUTEROL SULFATE 0.83 MG/ML
2.5 SOLUTION RESPIRATORY (INHALATION) AS NEEDED
Status: CANCELLED
Start: 2020-07-30

## 2020-04-24 RX ORDER — DIPHENHYDRAMINE HYDROCHLORIDE 50 MG/ML
25 INJECTION, SOLUTION INTRAMUSCULAR; INTRAVENOUS AS NEEDED
Status: CANCELLED
Start: 2020-07-30

## 2020-04-24 RX ORDER — PALONOSETRON 0.05 MG/ML
0.25 INJECTION, SOLUTION INTRAVENOUS ONCE
Status: CANCELLED | OUTPATIENT
Start: 2020-06-16

## 2020-04-24 RX ORDER — SODIUM CHLORIDE 9 MG/ML
25 INJECTION, SOLUTION INTRAVENOUS CONTINUOUS
Status: CANCELLED | OUTPATIENT
Start: 2020-07-09

## 2020-04-24 RX ORDER — PALONOSETRON 0.05 MG/ML
0.25 INJECTION, SOLUTION INTRAVENOUS ONCE
Status: CANCELLED | OUTPATIENT
Start: 2020-06-09

## 2020-04-24 RX ORDER — EPINEPHRINE 1 MG/ML
0.3 INJECTION, SOLUTION, CONCENTRATE INTRAVENOUS AS NEEDED
Status: CANCELLED | OUTPATIENT
Start: 2020-06-23

## 2020-04-24 RX ORDER — SODIUM CHLORIDE 0.9 % (FLUSH) 0.9 %
10 SYRINGE (ML) INJECTION AS NEEDED
Status: CANCELLED
Start: 2020-06-09

## 2020-04-24 RX ORDER — SODIUM CHLORIDE 9 MG/ML
25 INJECTION, SOLUTION INTRAVENOUS CONTINUOUS
Status: CANCELLED | OUTPATIENT
Start: 2020-06-16

## 2020-04-24 RX ORDER — ACETAMINOPHEN 325 MG/1
650 TABLET ORAL AS NEEDED
Status: CANCELLED
Start: 2020-06-23

## 2020-04-24 RX ORDER — ONDANSETRON 2 MG/ML
8 INJECTION INTRAMUSCULAR; INTRAVENOUS AS NEEDED
Status: CANCELLED | OUTPATIENT
Start: 2020-07-30

## 2020-04-24 RX ORDER — ALBUTEROL SULFATE 0.83 MG/ML
2.5 SOLUTION RESPIRATORY (INHALATION) AS NEEDED
Status: CANCELLED
Start: 2020-06-09

## 2020-04-24 RX ORDER — ALBUTEROL SULFATE 0.83 MG/ML
2.5 SOLUTION RESPIRATORY (INHALATION) AS NEEDED
Status: CANCELLED
Start: 2020-06-23

## 2020-04-24 RX ORDER — SODIUM CHLORIDE 9 MG/ML
10 INJECTION INTRAMUSCULAR; INTRAVENOUS; SUBCUTANEOUS AS NEEDED
Status: CANCELLED | OUTPATIENT
Start: 2020-06-23

## 2020-04-24 RX ORDER — SODIUM CHLORIDE 9 MG/ML
25 INJECTION, SOLUTION INTRAVENOUS CONTINUOUS
Status: CANCELLED | OUTPATIENT
Start: 2020-07-16

## 2020-04-24 RX ORDER — EPINEPHRINE 1 MG/ML
0.3 INJECTION, SOLUTION, CONCENTRATE INTRAVENOUS AS NEEDED
Status: CANCELLED | OUTPATIENT
Start: 2020-06-30

## 2020-04-24 RX ORDER — DIPHENHYDRAMINE HYDROCHLORIDE 50 MG/ML
25 INJECTION, SOLUTION INTRAMUSCULAR; INTRAVENOUS AS NEEDED
Status: CANCELLED
Start: 2020-07-16

## 2020-04-24 RX ORDER — HYDROCORTISONE SODIUM SUCCINATE 100 MG/2ML
100 INJECTION, POWDER, FOR SOLUTION INTRAMUSCULAR; INTRAVENOUS AS NEEDED
Status: CANCELLED | OUTPATIENT
Start: 2020-06-30

## 2020-04-24 RX ORDER — PALONOSETRON 0.05 MG/ML
0.25 INJECTION, SOLUTION INTRAVENOUS ONCE
Status: CANCELLED | OUTPATIENT
Start: 2020-07-16

## 2020-04-24 RX ORDER — DIPHENHYDRAMINE HYDROCHLORIDE 50 MG/ML
25 INJECTION, SOLUTION INTRAMUSCULAR; INTRAVENOUS AS NEEDED
Status: CANCELLED
Start: 2020-06-23

## 2020-04-24 RX ORDER — ONDANSETRON 2 MG/ML
8 INJECTION INTRAMUSCULAR; INTRAVENOUS AS NEEDED
Status: CANCELLED | OUTPATIENT
Start: 2020-06-16

## 2020-04-24 RX ORDER — EPINEPHRINE 1 MG/ML
0.3 INJECTION, SOLUTION, CONCENTRATE INTRAVENOUS AS NEEDED
Status: CANCELLED | OUTPATIENT
Start: 2020-06-09

## 2020-04-24 RX ORDER — ONDANSETRON 2 MG/ML
8 INJECTION INTRAMUSCULAR; INTRAVENOUS AS NEEDED
Status: CANCELLED | OUTPATIENT
Start: 2020-06-09

## 2020-04-24 RX ORDER — DIPHENHYDRAMINE HYDROCHLORIDE 50 MG/ML
25 INJECTION, SOLUTION INTRAMUSCULAR; INTRAVENOUS AS NEEDED
Status: CANCELLED
Start: 2020-06-16

## 2020-04-24 RX ORDER — SODIUM CHLORIDE 0.9 % (FLUSH) 0.9 %
10 SYRINGE (ML) INJECTION AS NEEDED
Status: CANCELLED
Start: 2020-06-23

## 2020-04-24 RX ORDER — DIPHENHYDRAMINE HYDROCHLORIDE 50 MG/ML
25 INJECTION, SOLUTION INTRAMUSCULAR; INTRAVENOUS AS NEEDED
Status: CANCELLED
Start: 2020-06-09

## 2020-04-24 RX ORDER — DIPHENHYDRAMINE HYDROCHLORIDE 50 MG/ML
50 INJECTION, SOLUTION INTRAMUSCULAR; INTRAVENOUS AS NEEDED
Status: CANCELLED
Start: 2020-06-16

## 2020-04-24 RX ORDER — PALONOSETRON 0.05 MG/ML
0.25 INJECTION, SOLUTION INTRAVENOUS ONCE
Status: CANCELLED | OUTPATIENT
Start: 2020-07-30

## 2020-04-24 RX ORDER — SODIUM CHLORIDE 9 MG/ML
10 INJECTION INTRAMUSCULAR; INTRAVENOUS; SUBCUTANEOUS AS NEEDED
Status: CANCELLED | OUTPATIENT
Start: 2020-07-16

## 2020-04-24 RX ORDER — SODIUM CHLORIDE 9 MG/ML
10 INJECTION INTRAMUSCULAR; INTRAVENOUS; SUBCUTANEOUS AS NEEDED
Status: CANCELLED | OUTPATIENT
Start: 2020-07-09

## 2020-04-24 RX ORDER — SODIUM CHLORIDE 9 MG/ML
10 INJECTION INTRAMUSCULAR; INTRAVENOUS; SUBCUTANEOUS AS NEEDED
Status: CANCELLED | OUTPATIENT
Start: 2020-06-09

## 2020-04-24 RX ORDER — SODIUM CHLORIDE 9 MG/ML
10 INJECTION INTRAMUSCULAR; INTRAVENOUS; SUBCUTANEOUS AS NEEDED
Status: CANCELLED | OUTPATIENT
Start: 2020-06-16

## 2020-04-24 RX ORDER — SODIUM CHLORIDE 0.9 % (FLUSH) 0.9 %
10 SYRINGE (ML) INJECTION AS NEEDED
Status: CANCELLED
Start: 2020-07-16

## 2020-04-24 RX ORDER — ALBUTEROL SULFATE 0.83 MG/ML
2.5 SOLUTION RESPIRATORY (INHALATION) AS NEEDED
Status: CANCELLED
Start: 2020-06-30

## 2020-04-24 RX ORDER — ACETAMINOPHEN 325 MG/1
650 TABLET ORAL AS NEEDED
Status: CANCELLED
Start: 2020-07-16

## 2020-04-24 RX ORDER — EPINEPHRINE 1 MG/ML
0.3 INJECTION, SOLUTION, CONCENTRATE INTRAVENOUS AS NEEDED
Status: CANCELLED | OUTPATIENT
Start: 2020-07-16

## 2020-04-24 RX ORDER — DIPHENHYDRAMINE HYDROCHLORIDE 50 MG/ML
50 INJECTION, SOLUTION INTRAMUSCULAR; INTRAVENOUS AS NEEDED
Status: CANCELLED
Start: 2020-07-16

## 2020-04-24 RX ORDER — EPINEPHRINE 1 MG/ML
0.3 INJECTION, SOLUTION, CONCENTRATE INTRAVENOUS AS NEEDED
Status: CANCELLED | OUTPATIENT
Start: 2020-06-16

## 2020-04-24 RX ORDER — PALONOSETRON 0.05 MG/ML
0.25 INJECTION, SOLUTION INTRAVENOUS ONCE
Status: CANCELLED | OUTPATIENT
Start: 2020-06-30

## 2020-04-24 RX ORDER — DIPHENHYDRAMINE HYDROCHLORIDE 50 MG/ML
50 INJECTION, SOLUTION INTRAMUSCULAR; INTRAVENOUS AS NEEDED
Status: CANCELLED
Start: 2020-06-09

## 2020-04-24 RX ORDER — ONDANSETRON 2 MG/ML
8 INJECTION INTRAMUSCULAR; INTRAVENOUS AS NEEDED
Status: CANCELLED | OUTPATIENT
Start: 2020-06-23

## 2020-04-24 RX ORDER — HEPARIN 100 UNIT/ML
300-500 SYRINGE INTRAVENOUS AS NEEDED
Status: CANCELLED
Start: 2020-07-16

## 2020-04-24 RX ORDER — SODIUM CHLORIDE 0.9 % (FLUSH) 0.9 %
10 SYRINGE (ML) INJECTION AS NEEDED
Status: CANCELLED
Start: 2020-07-09

## 2020-04-24 RX ORDER — HYDROCORTISONE SODIUM SUCCINATE 100 MG/2ML
100 INJECTION, POWDER, FOR SOLUTION INTRAMUSCULAR; INTRAVENOUS AS NEEDED
Status: CANCELLED | OUTPATIENT
Start: 2020-07-30

## 2020-04-24 RX ORDER — ACETAMINOPHEN 325 MG/1
650 TABLET ORAL AS NEEDED
Status: CANCELLED
Start: 2020-06-30

## 2020-04-24 RX ORDER — DIPHENHYDRAMINE HYDROCHLORIDE 50 MG/ML
50 INJECTION, SOLUTION INTRAMUSCULAR; INTRAVENOUS AS NEEDED
Status: CANCELLED
Start: 2020-06-23

## 2020-04-24 RX ORDER — SODIUM CHLORIDE 9 MG/ML
25 INJECTION, SOLUTION INTRAVENOUS CONTINUOUS
Status: CANCELLED | OUTPATIENT
Start: 2020-06-23

## 2020-04-24 RX ORDER — ALBUTEROL SULFATE 0.83 MG/ML
2.5 SOLUTION RESPIRATORY (INHALATION) AS NEEDED
Status: CANCELLED
Start: 2020-07-09

## 2020-04-24 RX ORDER — PALONOSETRON 0.05 MG/ML
0.25 INJECTION, SOLUTION INTRAVENOUS ONCE
Status: CANCELLED | OUTPATIENT
Start: 2020-07-09

## 2020-04-24 RX ORDER — DIPHENHYDRAMINE HYDROCHLORIDE 50 MG/ML
50 INJECTION, SOLUTION INTRAMUSCULAR; INTRAVENOUS AS NEEDED
Status: CANCELLED
Start: 2020-07-09

## 2020-04-24 RX ORDER — SODIUM CHLORIDE 0.9 % (FLUSH) 0.9 %
10 SYRINGE (ML) INJECTION AS NEEDED
Status: CANCELLED
Start: 2020-06-30

## 2020-04-24 RX ORDER — HEPARIN 100 UNIT/ML
300-500 SYRINGE INTRAVENOUS AS NEEDED
Status: CANCELLED
Start: 2020-07-30

## 2020-04-24 RX ORDER — HYDROCORTISONE SODIUM SUCCINATE 100 MG/2ML
100 INJECTION, POWDER, FOR SOLUTION INTRAMUSCULAR; INTRAVENOUS AS NEEDED
Status: CANCELLED | OUTPATIENT
Start: 2020-06-09

## 2020-04-24 RX ORDER — HYDROCORTISONE SODIUM SUCCINATE 100 MG/2ML
100 INJECTION, POWDER, FOR SOLUTION INTRAMUSCULAR; INTRAVENOUS AS NEEDED
Status: CANCELLED | OUTPATIENT
Start: 2020-06-23

## 2020-04-24 RX ORDER — EPINEPHRINE 1 MG/ML
0.3 INJECTION, SOLUTION, CONCENTRATE INTRAVENOUS AS NEEDED
Status: CANCELLED | OUTPATIENT
Start: 2020-07-09

## 2020-04-24 RX ORDER — HEPARIN 100 UNIT/ML
300-500 SYRINGE INTRAVENOUS AS NEEDED
Status: CANCELLED
Start: 2020-06-23

## 2020-04-24 RX ORDER — ACETAMINOPHEN 325 MG/1
650 TABLET ORAL AS NEEDED
Status: CANCELLED
Start: 2020-07-09

## 2020-04-24 RX ORDER — HEPARIN 100 UNIT/ML
300-500 SYRINGE INTRAVENOUS AS NEEDED
Status: CANCELLED
Start: 2020-06-09

## 2020-04-24 RX ORDER — HYDROCORTISONE SODIUM SUCCINATE 100 MG/2ML
100 INJECTION, POWDER, FOR SOLUTION INTRAMUSCULAR; INTRAVENOUS AS NEEDED
Status: CANCELLED | OUTPATIENT
Start: 2020-06-16

## 2020-04-24 RX ORDER — SODIUM CHLORIDE 9 MG/ML
25 INJECTION, SOLUTION INTRAVENOUS CONTINUOUS
Status: CANCELLED | OUTPATIENT
Start: 2020-06-09

## 2020-04-24 RX ORDER — ALBUTEROL SULFATE 0.83 MG/ML
2.5 SOLUTION RESPIRATORY (INHALATION) AS NEEDED
Status: CANCELLED
Start: 2020-07-16

## 2020-04-24 RX ORDER — SODIUM CHLORIDE 0.9 % (FLUSH) 0.9 %
10 SYRINGE (ML) INJECTION AS NEEDED
Status: CANCELLED
Start: 2020-07-30

## 2020-04-24 RX ORDER — SODIUM CHLORIDE 9 MG/ML
10 INJECTION INTRAMUSCULAR; INTRAVENOUS; SUBCUTANEOUS AS NEEDED
Status: CANCELLED | OUTPATIENT
Start: 2020-07-30

## 2020-04-24 RX ORDER — HYDROCORTISONE SODIUM SUCCINATE 100 MG/2ML
100 INJECTION, POWDER, FOR SOLUTION INTRAMUSCULAR; INTRAVENOUS AS NEEDED
Status: CANCELLED | OUTPATIENT
Start: 2020-07-16

## 2020-04-24 RX ORDER — HEPARIN 100 UNIT/ML
300-500 SYRINGE INTRAVENOUS AS NEEDED
Status: CANCELLED
Start: 2020-06-16

## 2020-04-24 RX ORDER — EPINEPHRINE 1 MG/ML
0.3 INJECTION, SOLUTION, CONCENTRATE INTRAVENOUS AS NEEDED
Status: CANCELLED | OUTPATIENT
Start: 2020-07-30

## 2020-04-24 RX ORDER — SODIUM CHLORIDE 9 MG/ML
10 INJECTION INTRAMUSCULAR; INTRAVENOUS; SUBCUTANEOUS AS NEEDED
Status: CANCELLED | OUTPATIENT
Start: 2020-06-30

## 2020-04-24 RX ORDER — DIPHENHYDRAMINE HYDROCHLORIDE 50 MG/ML
25 INJECTION, SOLUTION INTRAMUSCULAR; INTRAVENOUS AS NEEDED
Status: CANCELLED
Start: 2020-06-30

## 2020-04-24 RX ORDER — ACETAMINOPHEN 325 MG/1
650 TABLET ORAL AS NEEDED
Status: CANCELLED
Start: 2020-07-30

## 2020-04-24 RX ORDER — DIPHENHYDRAMINE HYDROCHLORIDE 50 MG/ML
50 INJECTION, SOLUTION INTRAMUSCULAR; INTRAVENOUS AS NEEDED
Status: CANCELLED
Start: 2020-07-30

## 2020-04-24 RX ORDER — ALBUTEROL SULFATE 0.83 MG/ML
2.5 SOLUTION RESPIRATORY (INHALATION) AS NEEDED
Status: CANCELLED
Start: 2020-06-16

## 2020-04-24 RX ORDER — HYDROCORTISONE SODIUM SUCCINATE 100 MG/2ML
100 INJECTION, POWDER, FOR SOLUTION INTRAMUSCULAR; INTRAVENOUS AS NEEDED
Status: CANCELLED | OUTPATIENT
Start: 2020-07-09

## 2020-04-24 RX ORDER — DIPHENHYDRAMINE HYDROCHLORIDE 50 MG/ML
50 INJECTION, SOLUTION INTRAMUSCULAR; INTRAVENOUS AS NEEDED
Status: CANCELLED
Start: 2020-06-30

## 2020-04-24 RX ORDER — SODIUM CHLORIDE 9 MG/ML
25 INJECTION, SOLUTION INTRAVENOUS CONTINUOUS
Status: CANCELLED | OUTPATIENT
Start: 2020-06-30

## 2020-04-24 RX ORDER — PALONOSETRON 0.05 MG/ML
0.25 INJECTION, SOLUTION INTRAVENOUS ONCE
Status: CANCELLED | OUTPATIENT
Start: 2020-06-23

## 2020-04-24 RX ORDER — ACETAMINOPHEN 325 MG/1
650 TABLET ORAL AS NEEDED
Status: CANCELLED
Start: 2020-06-09

## 2020-04-24 NOTE — TELEPHONE ENCOUNTER
[address] at [place] [address]  [phone]    04/24/20- Informed patient of interaction between B6 and Cisplatin. Instructed her to stop B6 supplement at least 1 week prior to starting therapy, per Dr. Laura Maxwell. Patient verbalized understanding, no further questions or concerns.

## 2020-04-24 NOTE — TELEPHONE ENCOUNTER
[address] at [facility]  [phone]    When signing Cisplatin chemotherapy orders, I received a flag from the computer of an interaction with B6 which can reduce efficacy of chemotherapy. Please instruct patient to stop B6 supplements (pyridoxine) at least a week before we start treatment.

## 2020-04-24 NOTE — PROGRESS NOTES
Oncology Navigator  Psychosocial Assessment    Reason for Assessment:    []Depression  []Anxiety  []Caregiver [place]  []Maladaptive Coping with Serious Illness   [x] Social Work Referral [x] Initial Assessment  [] Other     Sources of Information:    [x]Patient  []Family  [x]Staff  [x]Medical Record    Advance Care Planning:  No flowsheet data found.     Mental Status:    [x]Alert  []Lethargic  []Unresponsive   [] Unable to assess   Oriented to:  [x]Person  [x]Place  [x]Time  [x]Situation      Barriers to Learning:    []Language  []Developmental  []Cognitive  []Altered Mental Status  []Visual/Hearing Impairment  []Unable to Read/Write  []Motivational   [x]No Barriers Identified  []Other:    Relationship Status:  []Single  [x]  []Significant Other/Life Partner  []  []  []      Living Circumstances:  []Lives Alone  [x]Family/Significant Other in Household  []Roommates  []Children in the Home  []Paid Caregivers  []Assisted Living Facility/Group Home  []Skilled [address]  []Homeless  []Incarcerated  []Environmental/Care Concerns  []other:    Employment Status:  []Employed Full-time []Employed Part-time []Homemaker [] Disabled  [] Retired [x]Other: furloughed    Support System:    [x]Strong  []Fair  []Limited    Financial/Legal Concerns:    []Uninsured  [x]Limited Income/Resources  []Non-Citizen  []No Concerns Identified  []Financial POA:    []Other:    Episcopalian/Spiritual/Existential:  []Strong Sense of Spirituality  []Involved in [employer]  []Request  Visit  []Expressing Nichelle Burch  [x]No Concerns Identified    Coping with Illness:         Patient: Family/Caregiver:   Understanding and Acceptance of Illness/Prognosis  [x] []   Strong Sense of Resilience [x] []   Self Reflection [x] []   Engaged Support System [] []   Does not Readily Discuss Illness [] []   Denial of Terminal Status [] []   Anger [] []   Depression [] []   Anxiety/Fear [] [] Bargaining [] []   Recent Diagnosis/Prognosis [x] []   Difficulties with Body Image [] []   Loss of Identity [] []   Excessive Substance Use [] []   Mental Health History [] []   Enmeshed Relationships [] []   History of Loss [] []   Anticipatory Grief [] []   Concern for Complicated Grief [] []   Suicidal Ideation or Plan [] []   Unable to assess [] []            Narrative: Patient here for initial consultation with Dr. Faustino Grewal for the management of h&n cancer. Met with patient to introduce social work role and supports. Patient lives with her  in their private residence. They have a son and grandson who live close to them. Patient is currently furloughed due to COVID-19 and is receiving unemployment. She still has health insurance through her employer. Patient tells me her biggest concern is financial. Provided information about the FA program.    Offered supportive listening as patient ventilates feelings regarding diagnosis and treatment. She denies feelings of depression and anxiety. She reports having \"one big cry\" but feels ready to move forward with treatment. She is well supported by family and friends with practical and emotional support. Encouraged patient to contact me as needed for ongoing psychosocial support. Assessment/Action:   1. patient is actively coping with diagnosis and well supported by family with practical and emotional needs. 2. Provided FA application to patient as finances are a barrier to care. 3. Ongoing psychosocial support.     Plan/Referral:   Financial/Medication assistance referral       Thank you,  [facility], LCSW

## 2020-04-27 ENCOUNTER — HOSPITAL ENCOUNTER (OUTPATIENT)
Dept: GENERAL RADIOLOGY | Age: 50
Discharge: HOME | End: 2020-04-27
Attending: RADIOLOGY
Payer: COMMERCIAL

## 2020-04-27 DIAGNOSIS — C01 MALIGNANT NEOPLASM OF BASE OF TONGUE (HCC): ICD-10-CM

## 2020-04-27 PROCEDURE — 74230 X-RAY XM SWLNG FUNCJ C+: CPT

## 2020-04-27 PROCEDURE — 92611 MOTION FLUOROSCOPY/SWALLOW: CPT

## 2020-04-27 NOTE — PROGRESS NOTES
[facility]  [address] [facility], [address] [facility] STUDY  Patient: Marcellus Hart (25 y.o. female)  Date: 2020  Referring Provider:  Christal Pope:   Patient had no c/o dysphagia at this time. She was dx with R neck lymph nodes and R BOT CA. Planning for 7 weeks concurrent rad and chemo    OBJECTIVE:   Past Medical History:   Past Medical History:   Diagnosis Date    Cancer ([address]     rt side neck lymph node    Fainting spell      Past Surgical History:   Procedure Laterality Date    ABDOMEN SURGERY PROC UNLISTED      HX  SECTION       Current Dietary Status:  Regular, thins  Radiologist: Ashwin  Film Views: Lateral  Patient Position: upright standing    Trial 1: Trial 2:   Consistency Presented: Puree; Solid; Thin liquid;Pill/Tablet     How Presented: Self-fed/presented;Spoon;Straw;Successive swallows      ORAL PHASE:      Bolus Acceptance: No impairment     Bolus Formation/Control: No impairment:    :     Propulsion: No impairment     Oral Residue: None   PHARYNGEAL PHASE:      Initiation of Swallow: Triggered at base of tongue     Timing: Pooling 1-5 sec     Penetration: Flash/transient(with thins on first few sips)     Aspiration/Timing: No evidence of aspiration     Pharyngeal Clearance: No residue                               Trial 3: Trial 4:                            :    :                                                                        Decreased Tongue Base Retraction?: No  Laryngeal Elevation: WFL (within functional limits)  Aspiration/Penetration Score: 2 (Penetration/No residue-Contrast enters the airway penetrates, remains above the folds/cords, and is cleared)                     ASSESSMENT :  Based on the objective data described above, the patient presents with minimal dysphagia at this time. Only noted trace penetration.        PLAN/RECOMMENDATIONS :  Best practice guidelines for H&N CA in BOT with plan for lengthy concurrent chemo and radiation would highly predict need for PEG tube. Provided 1 swallow exercises to patient: effortful swallow. Best practice also recommends patient try to eat and drink a little each day during chemo and rad to maintain swallow function. She may need another MBS at end of chemo/rad. COMMUNICATION/EDUCATION:   The above findings and recommendations were discussed with: patient who verbalized understanding.     Thank you for this referral.  Nelsy Zamora, SLP  Time Calculation: 15 mins

## 2020-04-28 ENCOUNTER — HOSPITAL ENCOUNTER (OUTPATIENT)
Dept: INTERVENTIONAL RADIOLOGY/VASCULAR | Age: 50
Discharge: HOME | End: 2020-04-28
Attending: INTERNAL MEDICINE | Admitting: INTERNAL MEDICINE
Payer: COMMERCIAL

## 2020-04-28 VITALS
SYSTOLIC BLOOD PRESSURE: 104 MMHG | OXYGEN SATURATION: 97 % | DIASTOLIC BLOOD PRESSURE: 55 MMHG | HEART RATE: 57 BPM | HEIGHT: 63 IN | WEIGHT: 121.03 LBS | TEMPERATURE: 97.7 F | RESPIRATION RATE: 16 BRPM | BODY MASS INDEX: 21.45 KG/M2

## 2020-04-28 DIAGNOSIS — C01 SQUAMOUS CELL CARCINOMA OF BASE OF TONGUE (HCC): ICD-10-CM

## 2020-04-28 PROCEDURE — 99152 MOD SED SAME PHYS/QHP 5/>YRS: CPT

## 2020-04-28 PROCEDURE — 36561 INSERT TUNNELED CV CATH: CPT

## 2020-04-28 PROCEDURE — 74011250636 HC RX REV CODE- 250/636: Performed by: INTERNAL MEDICINE

## 2020-04-28 PROCEDURE — 99153 MOD SED SAME PHYS/QHP EA: CPT

## 2020-04-28 PROCEDURE — C1769 GUIDE WIRE: HCPCS

## 2020-04-28 PROCEDURE — 74011000250 HC RX REV CODE- 250: Performed by: RADIOLOGY

## 2020-04-28 PROCEDURE — 77030010507 HC ADH SKN DERMBND J&J -B

## 2020-04-28 PROCEDURE — C1894 INTRO/SHEATH, NON-LASER: HCPCS

## 2020-04-28 PROCEDURE — C1788 PORT, INDWELLING, IMP: HCPCS

## 2020-04-28 PROCEDURE — 74011250636 HC RX REV CODE- 250/636: Performed by: RADIOLOGY

## 2020-04-28 PROCEDURE — 77001 FLUOROGUIDE FOR VEIN DEVICE: CPT

## 2020-04-28 PROCEDURE — 76937 US GUIDE VASCULAR ACCESS: CPT

## 2020-04-28 PROCEDURE — 77030031139 HC SUT VCRL2 J&J -A

## 2020-04-28 RX ORDER — HEPARIN SODIUM 200 [USP'U]/100ML
500 INJECTION, SOLUTION INTRAVENOUS CONTINUOUS
Status: DISCONTINUED | OUTPATIENT
Start: 2020-04-28 | End: 2020-04-28 | Stop reason: HOSPADM

## 2020-04-28 RX ORDER — LIDOCAINE HYDROCHLORIDE AND EPINEPHRINE 10; 10 MG/ML; UG/ML
1.5 INJECTION, SOLUTION INFILTRATION; PERINEURAL ONCE
Status: COMPLETED | OUTPATIENT
Start: 2020-04-28 | End: 2020-04-28

## 2020-04-28 RX ORDER — LIDOCAINE HYDROCHLORIDE 10 MG/ML
5-10 INJECTION INFILTRATION; PERINEURAL
Status: COMPLETED | OUTPATIENT
Start: 2020-04-28 | End: 2020-04-28

## 2020-04-28 RX ORDER — CEFAZOLIN SODIUM/WATER 2 G/20 ML
2 SYRINGE (ML) INTRAVENOUS ONCE
Status: DISCONTINUED | OUTPATIENT
Start: 2020-04-28 | End: 2020-04-28

## 2020-04-28 RX ORDER — HEPARIN SODIUM 200 [USP'U]/100ML
500 INJECTION, SOLUTION INTRAVENOUS CONTINUOUS
Status: DISCONTINUED | OUTPATIENT
Start: 2020-04-28 | End: 2020-04-28

## 2020-04-28 RX ORDER — MIDAZOLAM HYDROCHLORIDE 1 MG/ML
1-5 INJECTION, SOLUTION INTRAMUSCULAR; INTRAVENOUS
Status: DISCONTINUED | OUTPATIENT
Start: 2020-04-28 | End: 2020-04-28 | Stop reason: HOSPADM

## 2020-04-28 RX ORDER — WATER FOR INJECTION,STERILE
VIAL (ML) INJECTION ONCE
Status: DISCONTINUED | OUTPATIENT
Start: 2020-04-28 | End: 2020-04-28

## 2020-04-28 RX ORDER — FENTANYL CITRATE 50 UG/ML
25-100 INJECTION, SOLUTION INTRAMUSCULAR; INTRAVENOUS
Status: DISCONTINUED | OUTPATIENT
Start: 2020-04-28 | End: 2020-04-28 | Stop reason: HOSPADM

## 2020-04-28 RX ORDER — SODIUM CHLORIDE 9 MG/ML
25 INJECTION, SOLUTION INTRAVENOUS CONTINUOUS
Status: DISCONTINUED | OUTPATIENT
Start: 2020-04-28 | End: 2020-04-28 | Stop reason: HOSPADM

## 2020-04-28 RX ADMIN — LIDOCAINE HYDROCHLORIDE,EPINEPHRINE BITARTRATE 15 MG: 10; .01 INJECTION, SOLUTION INFILTRATION; PERINEURAL at 09:22

## 2020-04-28 RX ADMIN — FENTANYL CITRATE 25 MCG: 50 INJECTION, SOLUTION INTRAMUSCULAR; INTRAVENOUS at 09:13

## 2020-04-28 RX ADMIN — SODIUM CHLORIDE 25 ML/HR: 900 INJECTION, SOLUTION INTRAVENOUS at 08:27

## 2020-04-28 RX ADMIN — MIDAZOLAM 1 MG: 1 INJECTION INTRAMUSCULAR; INTRAVENOUS at 09:10

## 2020-04-28 RX ADMIN — MIDAZOLAM 1 MG: 1 INJECTION INTRAMUSCULAR; INTRAVENOUS at 09:12

## 2020-04-28 RX ADMIN — HEPARIN SODIUM 500 UNITS/HR: 200 INJECTION, SOLUTION INTRAVENOUS at 09:26

## 2020-04-28 RX ADMIN — FENTANYL CITRATE 25 MCG: 50 INJECTION, SOLUTION INTRAMUSCULAR; INTRAVENOUS at 09:21

## 2020-04-28 RX ADMIN — LIDOCAINE HYDROCHLORIDE 10 ML: 10 INJECTION, SOLUTION INFILTRATION; PERINEURAL at 09:13

## 2020-04-28 RX ADMIN — FENTANYL CITRATE 25 MCG: 50 INJECTION, SOLUTION INTRAMUSCULAR; INTRAVENOUS at 09:11

## 2020-04-28 NOTE — ROUTINE PROCESS
Cardiac Cath Lab Recovery Arrival Note:      Jose Alberto Nur arrived to Cardiac Cath Lab, Recovery Area. Staff introduced to patient. Patient identifiers verified with NAME and DATE OF BIRTH. Procedure verified with patient. Consent forms reviewed and signed by patient or authorized representative and verified. Allergies verified. Patient and family oriented to department. Patient and family informed of procedure and plan of care. Questions answered with review. Patient prepped for procedure, per orders from physician, prior to arrival.    Patient on cardiac monitor, non-invasive blood pressure, SPO2 monitor. On room air. Patient is A&Ox 3. Patient reports no CP. Patient in stretcher, in low position, with side rails up, call bell within reach, patient instructed to call if assistance as needed. Patient prep in: [address], [place] [address]. Prep by: KARINA    1108    TRANSFER - OUT REPORT:    Verbal report given to Silvano Kimbrough and Enoc Hess on Hudspeth Liudmila being transferred to procedure room for ordered procedure       Report consisted of patients Situation, Background, Assessment and   Recommendations(SBAR). Information from the following report(s) SBAR was reviewed with the receiving nurse. Opportunity for questions and clarification was provided. 1000    TRANSFER - IN REPORT:    Verbal report received from [facility] on Hudspeth Liudmila  being received from procedure room for routine progression of care. Report consisted of patients Situation, Background, Assessment and Recommendations(SBAR). Information from the following report(s) SBAR was reviewed with the receiving clinician. Opportunity for questions and clarification was provided. Assessment completed upon patients arrival to [address] [place] [address] and care assumed. Cardiac Cath Lab Recovery Arrival Note:    Jose Alberto Nur arrived to [facility] recovery area. Patient procedure= PAC placement.  Patient on cardiac monitor, non-invasive blood pressure, SPO2 monitor. On room air. IV  of NS on pump at [facility] ml/hr. Patient status doing well without problems. Patient is A&Ox 3. Patient reports no CP. PROCEDURE SITE CHECK:    Procedure site:without any bleeding and no hematoma, No pain/discomfort reported at procedure site. No change in patient status. Continue to monitor patient and status. 1100    Pt. discharge with questions answered and pt/family verbalized understanding. Will call if any questions arise.

## 2020-04-28 NOTE — PROGRESS NOTES
TRANSFER - OUT REPORT:    Verbal report given to ([address]) on Mounika Echavarria being transferred to [facility]) for routine post - op       Report consisted of patient's Situation, Background, Assessment and   Recommendations(SBAR). Information from the following report(s) SBAR was reviewed with the receiving nurse. Opportunity for questions and clarification was provided.       Patient transported with:   Registered Nurse

## 2020-04-28 NOTE — PROGRESS NOTES
PORT PLACED ON UPPER RIGHT CHEST, SUTURED IN PLACE, DERMABOND ON SKIN, HEPARIN FLUSH USED TO CHECK PLACEMENT, PORT IS READY TO USE

## 2020-04-28 NOTE — H&P
Interventional Radiology History and Physical (Outpatient)    4/28/2020    Patient: Yuni Singh 52 y.o. female     Referring Physician:  Roman Hinson MD    Chief Complaint: presents for portacath placement with conscious sedation    History of Present Illness: tongue cancer    History:  Past Medical History:   Diagnosis Date    Cancer ([address]     rt side neck lymph node    Fainting spell      Family History   Problem Relation Age of Onset    Diabetes Mother     Heart Disease Mother     Cancer Father         bone    Hypertension Father      Social History     Socioeconomic History    Marital status:      Spouse name: Not on file    Number of children: Not on file    Years of education: Not on file    Highest education level: Not on file   Occupational History    Not on file   Social Needs    Financial resource strain: Not on file    Food insecurity     Worry: Not on file     Inability: Not on file    Transportation needs     Medical: Not on file     Non-medical: Not on file   Tobacco Use    Smoking status: Current Every Day Smoker     Packs/day: 1.00     Years: 23.00     Pack years: 23.00     Types: Cigarettes    Smokeless tobacco: Never Used   Substance and Sexual Activity    Alcohol use:  Yes     Alcohol/week: 1.0 standard drinks     Types: 1 Cans of beer per week     Comment: occassionally    Drug use: No    Sexual activity: Yes     Partners: Male     Birth control/protection: Surgical   Lifestyle    Physical activity     Days per week: Not on file     Minutes per session: Not on file    Stress: Not on file   Relationships    Social connections     Talks on phone: Not on file     Gets together: Not on file     Attends Hinduism service: Not on file     Active member of club or organization: Not on file     Attends meetings of clubs or organizations: Not on file     Relationship status: Not on file    Intimate partner violence     Fear of current or ex partner: Not on file Emotionally abused: Not on file     Physically abused: Not on file     Forced sexual activity: Not on file   Other Topics Concern    Not on file   Social History Narrative    Not on file       Allergies: Allergies   Allergen Reactions    Tylenol-Codeine #3 [Acetaminophen-Codeine] Nausea Only       Prior to Admission Medications:  Prior to Admission medications    Medication Sig Start Date End Date Taking? Authorizing Provider   vitamin e (E GEMS) 100 unit capsule Take  by mouth daily. Yes Provider, Historical   pyridoxine, vitamin B6, (Vitamin B-6) 100 mg tablet Take 100 mg by mouth daily. Yes Provider, Historical   cholecalciferol (VITAMIN D3) (2,000 UNITS /50 MCG) cap capsule Take  by mouth two (2) times a day. Yes Provider, Historical   magnesium 250 mg tab Take  by mouth. Yes Provider, Historical   ascorbic acid, vitamin C, (Vitamin C) 250 mg tablet Take  by mouth. Yes Provider, Historical   citalopram (CELEXA) 20 mg tablet Take  by mouth daily. Yes Provider, Historical   prochlorperazine (Compazine) 10 mg tablet Take 1 Tab by mouth every six (6) hours as needed for Nausea. 4/23/20   Avinash Bailey MD   lidocaine-prilocaine (EMLA) topical cream Apply  to affected area as needed for Pain (Apply 30-60 min. prior to having your port accessed). 4/23/20   Avinash Bailey MD   ondansetron hcl (ZOFRAN) 8 mg tablet Take 1 Tab by mouth every eight (8) hours as needed for Nausea. 4/23/20   Ciaran Davies MD       Physical Exam:    Blood pressure 110/68, pulse (!) 58, temperature 97.7 °F (36.5 °C), resp. rate 11, height 5' 3\" (1.6 m), weight 54.9 kg (121 lb 0.5 oz), SpO2 99 %, not currently breastfeeding. General: alert, cooperative, no distress, appears stated age  Heart: normal S1 and S2  Lungs: clear to auscultation bilaterally    Plan of Care/Planned Procedure:  Risks, benefits, and alternatives reviewed with patient and she agrees to proceed with the procedure.      Markie Genao MD

## 2020-04-28 NOTE — PROGRESS NOTES
TRANSFER - IN REPORT:    Verbal report received from (DON) on Katty Bold  being received from [facility]) for ordered procedure      Report consisted of patients Situation, Background, Assessment and   Recommendations(SBAR). Information from the following report(s) SBAR was reviewed with the receiving nurse. Opportunity for questions and clarification was provided. Assessment completed upon patients arrival to unit and care assumed.

## 2020-04-29 ENCOUNTER — TELEPHONE (OUTPATIENT)
Dept: ONCOLOGY | Age: 50
End: 2020-04-29

## 2020-04-29 DIAGNOSIS — C01 SQUAMOUS CELL CARCINOMA OF BASE OF TONGUE (HCC): ICD-10-CM

## 2020-04-29 RX ORDER — PROCHLORPERAZINE MALEATE 10 MG
10 TABLET ORAL
Qty: 90 TAB | Refills: 3 | Status: SHIPPED | OUTPATIENT
Start: 2020-04-29 | End: 2020-04-29 | Stop reason: SDUPTHER

## 2020-04-29 NOTE — TELEPHONE ENCOUNTER
Patient called and she that she needs us to re due one of the medications that we call in for her so that is does not cost her $50. She said there is a way that we have done it before so that it does not cost her that much.   CB#[phone]

## 2020-04-30 DIAGNOSIS — C01 SQUAMOUS CELL CARCINOMA OF BASE OF TONGUE (HCC): ICD-10-CM

## 2020-04-30 RX ORDER — PROCHLORPERAZINE MALEATE 10 MG
10 TABLET ORAL
Qty: 90 TAB | Refills: 3 | Status: SHIPPED | OUTPATIENT
Start: 2020-04-30 | End: 2020-11-24

## 2020-04-30 RX ORDER — ONDANSETRON HYDROCHLORIDE 8 MG/1
8 TABLET, FILM COATED ORAL
Qty: 45 TAB | Refills: 5 | Status: SHIPPED | OUTPATIENT
Start: 2020-04-30 | End: 2020-11-24

## 2020-04-30 NOTE — TELEPHONE ENCOUNTER
Spoke to Ciara Yepez with [facility] who stated patients insurance will cover patients prochlorperazine if sent thru mail order. Informed nurse who sent thru mail order and the cost is $4.41. Called patient and informed her of the above and she stated medication should be on the way per [facility] and asked if her other medication, ondansetron could be sent thru the mail order as well. Informed patient I would let the nurse know. Patient verbalized understanding, no further questions or concerns.

## 2020-05-06 ENCOUNTER — TELEPHONE (OUTPATIENT)
Dept: ONCOLOGY | Age: 50
End: 2020-05-06

## 2020-05-06 NOTE — TELEPHONE ENCOUNTER
Patient wanted to let us know that she is going back to work on 5-18 and she wanted to know if there was any restrictions  Due to the port    Job Duties ----Work at [employer] --      Will be in a dental appt until about 1 or after [please call her back then

## 2020-05-06 NOTE — TELEPHONE ENCOUNTER
[employer] at [facility]  [phone]    05/06/20- Patient had port placed last week. Discussed with Bonifacio Mathew, recommend not lifting more that 10 lbs over her head for the first couple months. 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 Recommended that she try to lift less in the first couple months to allow adequate healing for the port. She verbalized understanding and will call back if she needs a letter for her work. Stated she's otherwise doing well. No further questions or concerns.

## 2020-05-07 ENCOUNTER — TELEPHONE (OUTPATIENT)
Dept: INFUSION THERAPY | Age: 50
End: 2020-05-07

## 2020-05-07 NOTE — TELEPHONE ENCOUNTER
[address] at [place] [address]  [phone]    05/07/20- Letter written and signed by [employer]. Mailed to patient's home address as requested.

## 2020-05-07 NOTE — TELEPHONE ENCOUNTER
Patient called and stated that she was told what her restrictions were when she returned to work. She needs a letter with her restrictions in writing for her employer. She would like it mailed to her home address.   [address] [place] [address]    Call back if needed [phone]

## 2020-05-25 NOTE — PROGRESS NOTES
[facility] [place] at [address], 2[address] [facility] 1007 [facility]  Kyle Tavarez: [phone]  F: [phone]      Reason for Visit:   Farnaz Cueva is a 52 y.o. female who is seen by synchronous (real-time) audio-video technology for follow up of oropharyngeal cancer. Treatment History:   · Biopsy of neck mass 3/25/2020: squamous cell carcinoma  · PET/CT 3/2/5684: Hypermetabolic right level 2 cervical lymph node likely corresponds to the biopsy proven squamous cell carcinoma. Focal increased tracer activity at the right tongue base/right vallecula is suspicious for primary malignancy. No evidence of distant metastatic disease. · Direct laryngoscopy by Dr. hSawn Haddad 4/13/2020: Vinayak Sera tissue in right lateral tongue base without well-defined mass. Biopsies of tongue base with invasive squamous cell carcinoma, moderately differentiated, p16+. 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 Her treatment has been delayed while awaiting dental work. She completed all of her dental work about 2 weeks ago. She went for planning last Friday. She reports that she has been doing well with eatings and drinking, gained 9 pounds. No pain. No dyspnea. No fevers, no recent infections. She had a virtual visit with GI. She is a smoker, currently 1ppd for about 30 years. Has not cut back. She is unaccompanied today. 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 She lives in [place] with her . PAST HISTORY: The following sections were reviewed and updated in the EMR as appropriate: PMH, SH, FH, Medications, Allergies. Allergies   Allergen Reactions    Tylenol-Codeine #3 [Acetaminophen-Codeine] Nausea Only      Review of Systems: A complete review of systems was obtained, reviewed.   Pertinent findings reviewed above. Physical Exam:     There were no vitals taken for this visit. ECOG PS: 0  The patient had difficulty establishing a video connection, so we converted to an audio only visit. No physical exam could be performed. Due to this being a TeleHealth evaluation (During KXIIL-94 public health emergency), many elements of the physical examination are unable to be assessed. Evaluation of the following organ systems was limited: Vitals/Constitutional/EENT/Resp/CV/GI//MS/Neuro/Skin/Heme-Lymph-Imm. Results:     Lab Results   Component Value Date/Time    WBC 7.1 04/23/2020 03:30 PM    HGB 13.9 04/23/2020 03:30 PM    HCT 43.0 04/23/2020 03:30 PM    PLATELET 947 90/87/0865 03:30 PM    MCV 91.5 04/23/2020 03:30 PM    ABS. NEUTROPHILS 4.5 04/23/2020 03:30 PM     Lab Results   Component Value Date/Time    Sodium 139 04/23/2020 03:30 PM    Potassium 4.5 04/23/2020 03:30 PM    Chloride 104 04/23/2020 03:30 PM    CO2 31 04/23/2020 03:30 PM    Glucose 81 04/23/2020 03:30 PM    BUN 15 04/23/2020 03:30 PM    Creatinine 0.76 04/23/2020 03:30 PM    GFR est AA >60 04/23/2020 03:30 PM    GFR est non-AA >60 04/23/2020 03:30 PM    Calcium 10.1 04/23/2020 03:30 PM     Lab Results   Component Value Date/Time    Bilirubin, total 0.3 04/23/2020 03:30 PM    ALT (SGPT) 24 04/23/2020 03:30 PM    AST (SGOT) 18 04/23/2020 03:30 PM    Alk. phosphatase 99 04/23/2020 03:30 PM    Protein, total 7.4 04/23/2020 03:30 PM    Albumin 4.0 04/23/2020 03:30 PM    Globulin 3.4 04/23/2020 03:30 PM       Assessment:   1) Squamous cell carcinoma of the right base of tongue  Stage I, p16+  She has biopsy proven disease in her base of tongue and right cervical node. Images personally reviewed with radiologist, node is <2cm. She has Stage I disease based on the assumption that her cancer is largely driven by HPV, which portends a more favorable prognosis. However, she is also a smoker and this could be driving her disease as well.   The cooresponding HPV negative stage would be Stage III. NCCN guidelines recommend either radiation or concurrent chemoradiation for these patients. As she is young and fit, and given the smoking history and clinical equipoise, I have offered her the option of concurrent chemotherapy, with the understanding that the benefit may be modest.  My recommendation is for weekly cisplatin 40mg/m2. She has been undergoing dental evaluations prior to radiation planning. These have been completed and she is ready to get started. She had radiation planning last week. We will aim to start chemotherapy on 6/9/2020.    2) Dysphagia  Minimal on swallow study. Seen by speech pathologist.  Cora Girard will follow. She has met with GI, holding on PEG tube for now. 3) Tobacco abuse  I counseled her on the importance of cessation. 4) Work concerns  She is understandably concerned about working in [employer] during treatment, the [employer]. It would be reasonably for her to get off work during therapy. I will have our  reach out to her to discuss some options. Plan:     · Proceed on 6/9/2020 with C#1 of Cisplatin (40mg/m2), given every week concurrently with radiation. · Proceed with radiation therapy with Dr. Coronel Medicine  · Labs before each treatment: CBC, BMP, Mag  · Antiemetic prophylaxis: Ondansetron, Dexamethasone, Fosaprepitant prior to each chemotherapy. Dexamethasone for three days after therapy at home. · PRN antiemetics at home: Ondansetron, Prochlorperazine, Lorazepam  · Encouraged patient to push fluids (2L+ per day)  · Weekly labs during therapy, with IVFs 1-2x/week  · Return to see me with the start of therapy        Signed By: Elihu Eisenmenger, MD      I was in the office while conducting this encounter. The patient was at her home.     Consent:  She and/or her healthcare decision maker is aware that this patient-initiated Telehealth encounter is a billable service, with coverage as determined by her insurance carrier. She is aware that she may receive a bill and has provided verbal consent to proceed: Yes    Pursuant to the emergency declaration under the [employer] and the [facility], 1135 waiver authority and the [employer] and [employer] Act, this Virtual  Visit was conducted, with patient's (and/or legal guardian's) consent, to reduce the patient's risk of exposure to COVID-19 and provide necessary medical care.

## 2020-05-29 ENCOUNTER — VIRTUAL VISIT (OUTPATIENT)
Dept: ONCOLOGY | Age: 50
End: 2020-05-29

## 2020-05-29 DIAGNOSIS — C01 SQUAMOUS CELL CARCINOMA OF BASE OF TONGUE (HCC): Primary | ICD-10-CM

## 2020-05-29 NOTE — PROGRESS NOTES
Flower Valentin is a 52 y.o. female follow up for oropharyngeal cancer. 1. Have you been to the ER, urgent care clinic since your last visit? Hospitalized since your last visit?no     2. Have you seen or consulted any other health care providers outside of the [address] [place] [address] since your last visit? Include any pap smears or colon screening.  no

## 2020-06-01 ENCOUNTER — TELEPHONE (OUTPATIENT)
Dept: ONCOLOGY | Age: 50
End: 2020-06-01

## 2020-06-02 ENCOUNTER — PATIENT MESSAGE (OUTPATIENT)
Dept: ONCOLOGY | Age: 50
End: 2020-06-02

## 2020-06-02 ENCOUNTER — TELEPHONE (OUTPATIENT)
Dept: ONCOLOGY | Age: 50
End: 2020-06-02

## 2020-06-02 NOTE — TELEPHONE ENCOUNTER
Cancer [place] at [address] 100[address] [facility]  Renu Bucklinock: [phone]  F: [phone]    Medical Nutrition Therapy      Reason for nutrition encounter:  New H&N  Called and spoke with patient to introduce self and discussed role of oncology dietitian in providing supportive nutrition care. Explained that RD is available to be a resource for managing symptoms, minimizing weight changes and maintaining optimal nutrition status during and after cancer treatment. Reinforced importance of nutrition during treatment. Discussed increased energy needs during treatment, importance of swallowing throughout treatment, discussed minimizing weight loss and potential symptoms that impact ability to maintain nutrition status. Discussed possibility of feeding tube. Results:   Diagnosis: Oropharyngeal cancer   Plan for concurrent chemoradiation   Chemotherapy to start on June 9th. Wt Readings from Last 5 Encounters:   04/28/20 121 lb 0.5 oz (54.9 kg)   04/23/20 119 lb (54 kg)   04/13/20 116 lb 13.5 oz (53 kg)   04/07/20 115 lb (52.2 kg)   12/31/13 113 lb 11.2 oz (51.6 kg)       Estimated Nutrition Needs:   Calorie Range: 2200-2800kcal/day     Protein Range: 60-75g/day     Fluid Needs:  2000ml     Assessment:   Inadequate oral food or beverage intake  (potential) related to concurrent chemotherapy and radiation as evidence by treatment side effects. Plan:   - Continue with current diet. Emphasized eating crunchy food now as it will likely become difficulty throughout treatment. - Can get Ensure/Boosts ready for when you need to drink them. - Continue to be a resource for any nutrition related questions or concerns. Will continue to follow. I appreciate the opportunity to participate in Ms. Urmila Dickinson's care.     Signed By: Disha Reeves, [address], [address] [place] [address] , [address]     Contact: [phone]

## 2020-06-03 ENCOUNTER — APPOINTMENT (OUTPATIENT)
Dept: INFUSION THERAPY | Age: 50
End: 2020-06-03

## 2020-06-03 ENCOUNTER — PATIENT MESSAGE (OUTPATIENT)
Dept: ONCOLOGY | Age: 50
End: 2020-06-03

## 2020-06-03 NOTE — TELEPHONE ENCOUNTER
[address]   Social Work Navigator Encounter     Patient Name: Jean Castillo    Medical History: h&n cancer    Advance Directives: none on file; conversation deferred    Narrative: Social work referral received by Kyle Gong NP regarding disability questions. Called patient who tells me she returned back to work from being furloughed due to COVID-19. Patient works in a [employer] and plans to not work during treatment. Dr. Deb Ribera is in agreement with this. Patient advised she has disability insurance through her employer and asked for a letter. Explained often her company will have a specific form for our office to complete. Asked patient to verify and but you can do a letter if that is all that is needed. Patient called back to tell me only a letter is required that states her diagnosis and duration of treatment. Letter can be faxed to [phone]. Updated Kyle Gong. Barriers to Care: none at this time    Assessment/Action:  1. Our office to provide letter.      Plan/Referral:    Insurance/Entitlements referral  Other referral    Thank you,  Betty Byers LCSW

## 2020-06-04 ENCOUNTER — TELEPHONE (OUTPATIENT)
Dept: ONCOLOGY | Age: 50
End: 2020-06-04

## 2020-06-04 NOTE — TELEPHONE ENCOUNTER
Patient called back and she needs us to change the letter that we did to start on June 8 and not the 9th  #[phone]

## 2020-06-05 ENCOUNTER — TELEPHONE (OUTPATIENT)
Dept: ONCOLOGY | Age: 50
End: 2020-06-05

## 2020-06-05 ENCOUNTER — APPOINTMENT (OUTPATIENT)
Dept: INFUSION THERAPY | Age: 50
End: 2020-06-05

## 2020-06-05 NOTE — TELEPHONE ENCOUNTER
Patient needs her work note changed    It needs to state  That she will be put out of work starting 6-8    Also needs to be a return date even though the date may change her manager needs a date    Also that she is out due to the chemo because it compromises her immune system    Fax the new note   [phone]  Attkaitlynn canchola

## 2020-06-05 NOTE — TELEPHONE ENCOUNTER
She needs to add something else to the letter:    Patient is wondering if you can word that she needs not to be a work while getting treatment during the pandemic  This is way for her to get unemployment and received her benefit if not she will only draw a 100.00. This is only for the time of the treatment. Work is being difficult.  Please call  Her if you have questions or concern    [phone]

## 2020-06-05 NOTE — TELEPHONE ENCOUNTER
[address] at [facility]  [phone]    06/05/20- Letter written and signed by Deana Goyal. Faxed to [phone], confirmation received.

## 2020-06-08 NOTE — PROGRESS NOTES
[facility] [place] at [place] [address] [facility] [address] 100[address] [facility]  Antoine Mike: [phone]  F: [phone]      Reason for Visit:   George Gonzales is a 52 y.o. female who is seen for follow up of oropharyngeal cancer. Treatment History:   · Biopsy of neck mass 3/25/2020: squamous cell carcinoma  · PET/CT 4/9/5010: Hypermetabolic right level 2 cervical lymph node likely corresponds to the biopsy proven squamous cell carcinoma. Focal increased tracer activity at the right tongue base/right vallecula is suspicious for primary malignancy. No evidence of distant metastatic disease. · Direct laryngoscopy by Dr. Hollie Kaufman 4/13/2020: Bernis Birks tissue in right lateral tongue base without well-defined mass. Biopsies of tongue base with invasive squamous cell carcinoma, moderately differentiated, p16+. 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 Helen Gutiérrez on 6/8/2020  · Initiated curative intent therapy with weekly Cisplatin on 6/9/2020    History of Present Illness:   Feeling well today. Denies pain. No trouble swallowing. No baseline issue with nausea, vomiting, diarrhea or constipation. She is a smoker, currently 1ppd for about 30 years. Has not cut back. She is unaccompanied today. 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 She lives in [place] with her . PAST HISTORY: The following sections were reviewed and updated in the EMR as appropriate: PMH, SH, FH, Medications, Allergies. Allergies   Allergen Reactions    Tylenol-Codeine #3 [Acetaminophen-Codeine] Nausea Only      Review of Systems: A complete review of systems was obtained, reviewed. Pertinent findings reviewed above.       Physical Exam:     Visit Vitals  /76 (BP 1 Location: Right arm, BP Patient Position: Sitting)   Pulse (!) 54   Temp 96.8 °F (36 °C) (Temporal)   Resp 20   Ht 5' 3\" (1.6 m)   Wt 123 lb (55.8 kg)   SpO2 99%   BMI 21.79 kg/m²     ECOG PS: 0  General: No distress  Respiratory: Normal respiratory effort  CV: No peripheral edema  Skin: No rashes, ecchymoses, or petechiae  Psych: Alert, oriented, normal mood/affect    Results:     Lab Results   Component Value Date/Time    WBC 5.3 06/09/2020 10:37 AM    HGB 13.5 06/09/2020 10:37 AM    HCT 40.3 06/09/2020 10:37 AM    PLATELET 901 89/21/1240 10:37 AM    MCV 90.6 06/09/2020 10:37 AM    ABS. NEUTROPHILS 3.6 06/09/2020 10:37 AM     Lab Results   Component Value Date/Time    Sodium 137 06/09/2020 10:37 AM    Potassium 4.2 06/09/2020 10:37 AM    Chloride 107 06/09/2020 10:37 AM    CO2 28 06/09/2020 10:37 AM    Glucose 74 06/09/2020 10:37 AM    BUN 18 06/09/2020 10:37 AM    Creatinine 0.75 06/09/2020 10:37 AM    GFR est AA >60 06/09/2020 10:37 AM    GFR est non-AA >60 06/09/2020 10:37 AM    Calcium 9.0 06/09/2020 10:37 AM     Lab Results   Component Value Date/Time    Bilirubin, total 0.3 06/09/2020 10:37 AM    ALT (SGPT) 20 06/09/2020 10:37 AM    Alk. phosphatase 83 06/09/2020 10:37 AM    Protein, total 7.5 06/09/2020 10:37 AM    Albumin 3.8 06/09/2020 10:37 AM    Globulin 3.7 06/09/2020 10:37 AM       Assessment:   1) Squamous cell carcinoma of the right base of tongue  Stage I, p16+  She has biopsy proven disease in her base of tongue and right cervical node. Images personally reviewed with radiologist, node is <2cm. She has Stage I disease based on the assumption that her cancer is largely driven by HPV, which portends a more favorable prognosis. However, she is also a smoker and this could be driving her disease as well. The cooresponding HPV negative stage would be Stage III. NCCN guidelines recommend either radiation or concurrent chemoradiation for these patients.     As she is young and fit, and given the smoking history and clinical equipoise, I have offered her the option of concurrent chemotherapy, with the understanding that the benefit may be modest.  My recommendation is for weekly cisplatin 40mg/m2. We discussed the risks and benefits of cisplatin chemotherapy. Potential side effects include, but are not limited to, nausea, vomiting, diarrhea, taste changes, myelosuppression, infection, fatigue, alopecia, neuropathy, hearing loss, renal failure, allergic reactions, infertility, and rarely, death. Additionally, chemotherapy leads to radiosensitization which can intensify the side effects of radiation therapy. The patient has consented to beginning chemotherapy. Will proceed with C1 of weekly therapy today with plans for 7 weeks of treatment to coincide with radiation. She will be seen weekly on therapy. 2) Dysphagia  Minimal on swallow study. Seen by speech pathologist.  Xander Arce will follow. She has met with GI, holding on PEG tube for now. 3) Tobacco abuse  I counseled her on the importance of cessation. 4) Work concerns  She is understandably concerned about working in [employer] during treatment, the [employer]. She is seeking unemployment. Plan:     · Proceed today with C1 of Cisplatin (40mg/m2), given every week concurrently with radiation. · Proceed with radiation therapy with Dr. Grace Rinne  · Labs before each treatment: CBC, BMP, Mag  · Antiemetic prophylaxis: Ondansetron, Dexamethasone, Fosaprepitant prior to each chemotherapy. Dexamethasone for three days after therapy at home. · PRN antiemetics at home: Ondansetron, Prochlorperazine, Lorazepam  · Encouraged patient to push fluids (2L+ per day)  · Weekly labs during therapy, with IVFs 1-2x/week  · Return to see me weekly on therapy    Patient was seen in conjunction with Cathi Christensen NP.     Signed By: Bindu Paulino MD

## 2020-06-09 ENCOUNTER — OFFICE VISIT (OUTPATIENT)
Dept: ONCOLOGY | Age: 50
End: 2020-06-09

## 2020-06-09 ENCOUNTER — HOSPITAL ENCOUNTER (OUTPATIENT)
Dept: INFUSION THERAPY | Age: 50
Discharge: HOME | End: 2020-06-09
Payer: COMMERCIAL

## 2020-06-09 VITALS
BODY MASS INDEX: 21.85 KG/M2 | WEIGHT: 123.3 LBS | SYSTOLIC BLOOD PRESSURE: 112 MMHG | HEIGHT: 63 IN | TEMPERATURE: 97 F | HEART RATE: 72 BPM | OXYGEN SATURATION: 99 % | DIASTOLIC BLOOD PRESSURE: 65 MMHG | RESPIRATION RATE: 18 BRPM

## 2020-06-09 VITALS
HEART RATE: 54 BPM | BODY MASS INDEX: 21.79 KG/M2 | RESPIRATION RATE: 20 BRPM | SYSTOLIC BLOOD PRESSURE: 132 MMHG | HEIGHT: 63 IN | OXYGEN SATURATION: 99 % | DIASTOLIC BLOOD PRESSURE: 76 MMHG | TEMPERATURE: 96.8 F | WEIGHT: 123 LBS

## 2020-06-09 DIAGNOSIS — C01 SQUAMOUS CELL CARCINOMA OF BASE OF TONGUE (HCC): Primary | ICD-10-CM

## 2020-06-09 LAB
ALBUMIN SERPL-MCNC: 3.8 G/DL (ref 3.5–5)
ALBUMIN/GLOB SERPL: 1 {RATIO} (ref 1.1–2.2)
ALP SERPL-CCNC: 83 U/L (ref 45–117)
ALT SERPL-CCNC: 20 U/L (ref 12–78)
ANION GAP SERPL CALC-SCNC: 2 MMOL/L (ref 5–15)
AST SERPL-CCNC: 18 U/L (ref 15–37)
BASO+EOS+MONOS # BLD AUTO: 0.3 K/UL (ref 0.2–1.2)
BASO+EOS+MONOS NFR BLD AUTO: 7 % (ref 3.2–16.9)
BILIRUB SERPL-MCNC: 0.3 MG/DL (ref 0.2–1)
BUN SERPL-MCNC: 18 MG/DL (ref 6–20)
BUN/CREAT SERPL: 24 (ref 12–20)
CALCIUM SERPL-MCNC: 9 MG/DL (ref 8.5–10.1)
CHLORIDE SERPL-SCNC: 107 MMOL/L (ref 97–108)
CO2 SERPL-SCNC: 28 MMOL/L (ref 21–32)
CREAT SERPL-MCNC: 0.75 MG/DL (ref 0.55–1.02)
DIFFERENTIAL METHOD BLD: NORMAL
ERYTHROCYTE [DISTWIDTH] IN BLOOD BY AUTOMATED COUNT: 15.2 % (ref 11.8–15.8)
GLOBULIN SER CALC-MCNC: 3.7 G/DL (ref 2–4)
GLUCOSE SERPL-MCNC: 74 MG/DL (ref 65–100)
HCT VFR BLD AUTO: 40.3 % (ref 35–47)
HGB BLD-MCNC: 13.5 G/DL (ref 11.5–16)
LYMPHOCYTES # BLD: 1.4 K/UL (ref 0.8–3.5)
LYMPHOCYTES NFR BLD: 26 % (ref 12–49)
MAGNESIUM SERPL-MCNC: 2.1 MG/DL (ref 1.6–2.4)
MCH RBC QN AUTO: 30.3 PG (ref 26–34)
MCHC RBC AUTO-ENTMCNC: 33.5 G/DL (ref 30–36.5)
MCV RBC AUTO: 90.6 FL (ref 80–99)
NEUTS SEG # BLD: 3.6 K/UL (ref 1.8–8)
NEUTS SEG NFR BLD: 67 % (ref 32–75)
PLATELET # BLD AUTO: 286 K/UL (ref 150–400)
POTASSIUM SERPL-SCNC: 4.2 MMOL/L (ref 3.5–5.1)
PROT SERPL-MCNC: 7.5 G/DL (ref 6.4–8.2)
RBC # BLD AUTO: 4.45 M/UL (ref 3.8–5.2)
SODIUM SERPL-SCNC: 137 MMOL/L (ref 136–145)
WBC # BLD AUTO: 5.3 K/UL (ref 3.6–11)

## 2020-06-09 PROCEDURE — 74011250636 HC RX REV CODE- 250/636: Performed by: INTERNAL MEDICINE

## 2020-06-09 PROCEDURE — 83735 ASSAY OF MAGNESIUM: CPT

## 2020-06-09 PROCEDURE — 74011000258 HC RX REV CODE- 258: Performed by: INTERNAL MEDICINE

## 2020-06-09 PROCEDURE — 96367 TX/PROPH/DG ADDL SEQ IV INF: CPT

## 2020-06-09 PROCEDURE — 36415 COLL VENOUS BLD VENIPUNCTURE: CPT

## 2020-06-09 PROCEDURE — 77030012965 HC NDL HUBR BBMI -A

## 2020-06-09 PROCEDURE — 85025 COMPLETE CBC W/AUTO DIFF WBC: CPT

## 2020-06-09 PROCEDURE — 96413 CHEMO IV INFUSION 1 HR: CPT

## 2020-06-09 PROCEDURE — 96375 TX/PRO/DX INJ NEW DRUG ADDON: CPT

## 2020-06-09 PROCEDURE — 80053 COMPREHEN METABOLIC PANEL: CPT

## 2020-06-09 RX ORDER — SODIUM CHLORIDE 0.9 % (FLUSH) 0.9 %
10 SYRINGE (ML) INJECTION AS NEEDED
Status: DISPENSED | OUTPATIENT
Start: 2020-06-09 | End: 2020-06-09

## 2020-06-09 RX ORDER — DIPHENHYDRAMINE HYDROCHLORIDE 50 MG/ML
25 INJECTION, SOLUTION INTRAMUSCULAR; INTRAVENOUS AS NEEDED
Status: DISCONTINUED | OUTPATIENT
Start: 2020-06-09 | End: 2020-06-10 | Stop reason: HOSPADM

## 2020-06-09 RX ORDER — HYDROCORTISONE SODIUM SUCCINATE 100 MG/2ML
100 INJECTION, POWDER, FOR SOLUTION INTRAMUSCULAR; INTRAVENOUS AS NEEDED
Status: DISCONTINUED | OUTPATIENT
Start: 2020-06-09 | End: 2020-06-10 | Stop reason: HOSPADM

## 2020-06-09 RX ORDER — PALONOSETRON 0.05 MG/ML
0.25 INJECTION, SOLUTION INTRAVENOUS ONCE
Status: COMPLETED | OUTPATIENT
Start: 2020-06-09 | End: 2020-06-09

## 2020-06-09 RX ORDER — SODIUM CHLORIDE 9 MG/ML
25 INJECTION, SOLUTION INTRAVENOUS CONTINUOUS
Status: DISCONTINUED | OUTPATIENT
Start: 2020-06-09 | End: 2020-06-10 | Stop reason: HOSPADM

## 2020-06-09 RX ORDER — HEPARIN 100 UNIT/ML
300-500 SYRINGE INTRAVENOUS AS NEEDED
Status: ACTIVE | OUTPATIENT
Start: 2020-06-09 | End: 2020-06-09

## 2020-06-09 RX ORDER — SODIUM CHLORIDE 9 MG/ML
10 INJECTION INTRAMUSCULAR; INTRAVENOUS; SUBCUTANEOUS AS NEEDED
Status: ACTIVE | OUTPATIENT
Start: 2020-06-09 | End: 2020-06-09

## 2020-06-09 RX ADMIN — FOSAPREPITANT 150 MG: 150 INJECTION, POWDER, LYOPHILIZED, FOR SOLUTION INTRAVENOUS at 13:57

## 2020-06-09 RX ADMIN — DEXAMETHASONE SODIUM PHOSPHATE 12 MG: 4 INJECTION INTRA-ARTICULAR; INTRALESIONAL; INTRAMUSCULAR; INTRAVENOUS; SOFT TISSUE at 13:56

## 2020-06-09 RX ADMIN — Medication 500 UNITS: at 15:59

## 2020-06-09 RX ADMIN — CISPLATIN 62 MG: 1 INJECTION INTRAVENOUS at 14:56

## 2020-06-09 RX ADMIN — PALONOSETRON HYDROCHLORIDE 0.25 MG: 0.25 INJECTION INTRAVENOUS at 13:55

## 2020-06-09 RX ADMIN — Medication 10 ML: at 15:59

## 2020-06-09 RX ADMIN — SODIUM CHLORIDE 25 ML/HR: 900 INJECTION, SOLUTION INTRAVENOUS at 13:55

## 2020-06-09 RX ADMIN — POTASSIUM CHLORIDE: 2 INJECTION, SOLUTION, CONCENTRATE INTRAVENOUS at 12:49

## 2020-06-09 NOTE — PROGRESS NOTES
Juan A Adamson is a 52 y.o. female follow up for oropharyngeal cancer. 1. Have you been to the ER, urgent care clinic since your last visit? Hospitalized since your last visit?no     2. Have you seen or consulted any other health care providers outside of the [address] [place] [address] since your last visit? Include any pap smears or colon screening.  no

## 2020-06-09 NOTE — PROGRESS NOTES
[facility] Progress Note    Date: 2020    Name: Shay Fraga    MRN: [id number]         : 1970    Ms. Caro Arrived ambulatory and in no distress for C1D1 of Cisplatin Regimen. Assessment was completed, no acute issues at this time, no new complaints voiced. R chest wall port accessed without difficulty, labs drawn & sent for processing. Chemotherapy Flowsheet 2020   Cycle C1D1   Date 2020   Drug / Regimen cisplatin   Pre Hydration given   Pre Meds given   Notes given        Patient proceed to appointment with Dr. Chantel Francis. Ms. Kevin Bowers vitals were reviewed. Visit Vitals  /65   Pulse 72   Temp 97 °F (36.1 °C)   Resp 18   Ht 5' 3\" (1.6 m)   Wt 55.9 kg (123 lb 4.8 oz)   SpO2 99%   Breastfeeding No   BMI 21.84 kg/m²       Lab results were obtained and reviewed. Recent Results (from the past 12 hour(s))   CBC WITH 3 PART DIFF    Collection Time: 20 10:37 AM   Result Value Ref Range    WBC 5.3 3.6 - 11.0 K/uL    RBC 4.45 3.80 - 5.20 M/uL    HGB 13.5 11.5 - 16.0 g/dL    HCT 40.3 35.0 - 47.0 %    MCV 90.6 80.0 - 99.0 FL    MCH 30.3 26.0 - 34.0 PG    MCHC 33.5 30.0 - 36.5 g/dL    RDW 15.2 11.8 - 15.8 %    PLATELET 755 872 - 031 K/uL    NEUTROPHILS 67 32 - 75 %    MIXED CELLS 7 3.2 - 16.9 %    LYMPHOCYTES 26 12 - 49 %    ABS. NEUTROPHILS 3.6 1.8 - 8.0 K/UL    ABS. MIXED CELLS 0.3 0.2 - 1.2 K/uL    ABS.  LYMPHOCYTES 1.4 0.8 - 3.5 K/UL    DF AUTOMATED     METABOLIC PANEL, COMPREHENSIVE    Collection Time: 20 10:37 AM   Result Value Ref Range    Sodium 137 136 - 145 mmol/L    Potassium 4.2 3.5 - 5.1 mmol/L    Chloride 107 97 - 108 mmol/L    CO2 28 21 - 32 mmol/L    Anion gap 2 (L) 5 - 15 mmol/L    Glucose 74 65 - 100 mg/dL    BUN 18 6 - 20 MG/DL    Creatinine 0.75 0.55 - 1.02 MG/DL    BUN/Creatinine ratio 24 (H) 12 - 20      GFR est AA >60 >60 ml/min/1.73m2    GFR est non-AA >60 >60 ml/min/1.73m2    Calcium 9.0 8.5 - 10.1 MG/DL    Bilirubin, total 0.3 0.2 - 1.0 MG/DL    ALT (SGPT) 20 12 - 78 U/L    AST (SGOT) 18 15 - 37 U/L    Alk.  phosphatase 83 45 - 117 U/L    Protein, total 7.5 6.4 - 8.2 g/dL    Albumin 3.8 3.5 - 5.0 g/dL    Globulin 3.7 2.0 - 4.0 g/dL    A-G Ratio 1.0 (L) 1.1 - 2.2     MAGNESIUM    Collection Time: 06/09/20 10:37 AM   Result Value Ref Range    Magnesium 2.1 1.6 - 2.4 mg/dL       Medications:  Medications Administered     0.9% sodium chloride 1,000 mL with potassium chloride 10 mEq, magnesium sulfate 2 g infusion     Admin Date  06/09/2020 Action  Given Dose   Rate  1,000 mL/hr Route  IntraVENous Administered By  Mandy Fox RN          0.9% sodium chloride infusion     Admin Date  06/09/2020 Action  New Bag Dose  25 mL/hr Rate  25 mL/hr Route  IntraVENous Administered By  Mandy Fox RN          CISplatin (PLATINOL) 62 mg in 0.9% sodium chloride 250 mL, overfill volume 25 mL chemo infusion     Admin Date  06/09/2020 Action  New Bag Dose  62 mg Rate  337 mL/hr Route  IntraVENous Administered By  Mandy Fox RN          dexamethasone (DECADRON) 12 mg in 0.9% sodium chloride 50 mL IVPB     Admin Date  06/09/2020 Action  Given Dose  12 mg Route  IntraVENous Administered By  Mandy Fox RN          fosaprepitant (EMEND) 150 mg in 0.9% sodium chloride 150 mL IVPB     Admin Date  06/09/2020 Action  Given Dose  150 mg Rate  450 mL/hr Route  IntraVENous Administered By  Mandy Fox RN          heparin (porcine) pf 300-500 Units     Admin Date  06/09/2020 Action  Given Dose  500 Units Route  InterCATHeter Administered By  Mandy Fox RN          palonosetron HCl (ALOXI) injection 0.25 mg     Admin Date  06/09/2020 Action  Given Dose  0.25 mg Route  IntraVENous Administered By  Mandy Fox RN          saline peripheral flush soln 10 mL     Admin Date  06/09/2020 Action  Given Dose  10 mL Route  InterCATHeter Administered By  Mandy Fox, JOSÉ MIGUEL                  Ms. Can Rubin tolerated treatment well and [facility] at 1610. Port de-accessed, flushed & heparinized per protocol. She is to return on June 16 at 0900 for her next appointment.     Army Nelda RN  June 9, 2020

## 2020-06-10 ENCOUNTER — APPOINTMENT (OUTPATIENT)
Dept: INFUSION THERAPY | Age: 50
End: 2020-06-10

## 2020-06-12 ENCOUNTER — TELEPHONE (OUTPATIENT)
Dept: ONCOLOGY | Age: 50
End: 2020-06-12

## 2020-06-12 ENCOUNTER — APPOINTMENT (OUTPATIENT)
Dept: INFUSION THERAPY | Age: 50
End: 2020-06-12

## 2020-06-12 NOTE — TELEPHONE ENCOUNTER
Cancer [place] at [place] [address]  [address] [facility], [address] [place] [id number]  W: [phone]  F: [phone]    Medical Nutrition Therapy      Reason for nutrition encounter:  New H&N  Called to check in after first week of treatment. She states she nausea on the 2nd day but was relieved with zofran. She only needed to take zofran once. She reports eating and drinking without any difficultly. Emphasized the importance of nutrition and additional calories. Encouraged her to increase intake as able. Results:   Diagnosis: Oropharyngeal cancer   Started treatment on 6/9/20. Chemotherapy Flowsheet 6/9/2020   Cycle C1D1   Date 6/9/2020   Drug / Regimen cisplatin   Pre Hydration given   Pre Meds given   Notes given         Wt Readings from Last 5 Encounters:   06/09/20 123 lb (55.8 kg)   06/09/20 123 lb 4.8 oz (55.9 kg)   04/28/20 121 lb 0.5 oz (54.9 kg)   04/23/20 119 lb (54 kg)   04/13/20 116 lb 13.5 oz (53 kg)       Estimated Nutrition Needs:   Calorie Range: 2200-2800kcal/day     Protein Range: 60-75g/day     Fluid Needs:  2000ml     Assessment:   Inadequate oral food or beverage intake  (potential) related to concurrent chemotherapy and radiation as evidence by treatment side effects. Plan:   - Continue with current diet. Emphasized eating crunchy food now as it will likely become difficulty throughout treatment. - Can get Ensure/Boosts ready for when you need to drink them. - Continue to be a resource for any nutrition related questions or concerns. Will continue to follow. I appreciate the opportunity to participate in Ms. Urmila Dickinson's care.     Signed By: Courtney Schafer, [address] [place] [address], [address] [place] [address] , [address]     Contact: [phone]

## 2020-06-16 ENCOUNTER — OFFICE VISIT (OUTPATIENT)
Dept: ONCOLOGY | Age: 50
End: 2020-06-16

## 2020-06-16 ENCOUNTER — HOSPITAL ENCOUNTER (OUTPATIENT)
Dept: INFUSION THERAPY | Age: 50
Discharge: HOME | End: 2020-06-16
Payer: COMMERCIAL

## 2020-06-16 VITALS
HEART RATE: 68 BPM | BODY MASS INDEX: 21.1 KG/M2 | DIASTOLIC BLOOD PRESSURE: 52 MMHG | RESPIRATION RATE: 16 BRPM | OXYGEN SATURATION: 99 % | SYSTOLIC BLOOD PRESSURE: 104 MMHG | WEIGHT: 119.1 LBS | TEMPERATURE: 96.7 F | HEIGHT: 63 IN

## 2020-06-16 DIAGNOSIS — C01 SQUAMOUS CELL CARCINOMA OF BASE OF TONGUE (HCC): Primary | ICD-10-CM

## 2020-06-16 LAB
ALBUMIN SERPL-MCNC: 3.6 G/DL (ref 3.5–5)
ALBUMIN/GLOB SERPL: 0.9 {RATIO} (ref 1.1–2.2)
ALP SERPL-CCNC: 90 U/L (ref 45–117)
ALT SERPL-CCNC: 23 U/L (ref 12–78)
ANION GAP SERPL CALC-SCNC: 3 MMOL/L (ref 5–15)
AST SERPL-CCNC: 17 U/L (ref 15–37)
BASO+EOS+MONOS # BLD AUTO: 0.5 K/UL (ref 0.2–1.2)
BASO+EOS+MONOS NFR BLD AUTO: 8 % (ref 3.2–16.9)
BILIRUB SERPL-MCNC: 0.3 MG/DL (ref 0.2–1)
BUN SERPL-MCNC: 18 MG/DL (ref 6–20)
BUN/CREAT SERPL: 26 (ref 12–20)
CALCIUM SERPL-MCNC: 8.9 MG/DL (ref 8.5–10.1)
CHLORIDE SERPL-SCNC: 107 MMOL/L (ref 97–108)
CO2 SERPL-SCNC: 28 MMOL/L (ref 21–32)
CREAT SERPL-MCNC: 0.69 MG/DL (ref 0.55–1.02)
DIFFERENTIAL METHOD BLD: ABNORMAL
ERYTHROCYTE [DISTWIDTH] IN BLOOD BY AUTOMATED COUNT: 14.7 % (ref 11.8–15.8)
GLOBULIN SER CALC-MCNC: 3.8 G/DL (ref 2–4)
GLUCOSE SERPL-MCNC: 80 MG/DL (ref 65–100)
HCT VFR BLD AUTO: 38.3 % (ref 35–47)
HGB BLD-MCNC: 13.2 G/DL (ref 11.5–16)
LYMPHOCYTES # BLD: 1 K/UL (ref 0.8–3.5)
LYMPHOCYTES NFR BLD: 15 % (ref 12–49)
MAGNESIUM SERPL-MCNC: 2.1 MG/DL (ref 1.6–2.4)
MCH RBC QN AUTO: 30.9 PG (ref 26–34)
MCHC RBC AUTO-ENTMCNC: 34.5 G/DL (ref 30–36.5)
MCV RBC AUTO: 89.7 FL (ref 80–99)
NEUTS SEG # BLD: 4.9 K/UL (ref 1.8–8)
NEUTS SEG NFR BLD: 77 % (ref 32–75)
PLATELET # BLD AUTO: 249 K/UL (ref 150–400)
POTASSIUM SERPL-SCNC: 4.1 MMOL/L (ref 3.5–5.1)
PROT SERPL-MCNC: 7.4 G/DL (ref 6.4–8.2)
RBC # BLD AUTO: 4.27 M/UL (ref 3.8–5.2)
SODIUM SERPL-SCNC: 138 MMOL/L (ref 136–145)
WBC # BLD AUTO: 6.4 K/UL (ref 3.6–11)

## 2020-06-16 PROCEDURE — 80053 COMPREHEN METABOLIC PANEL: CPT

## 2020-06-16 PROCEDURE — 74011000250 HC RX REV CODE- 250: Performed by: INTERNAL MEDICINE

## 2020-06-16 PROCEDURE — 83735 ASSAY OF MAGNESIUM: CPT

## 2020-06-16 PROCEDURE — 96413 CHEMO IV INFUSION 1 HR: CPT

## 2020-06-16 PROCEDURE — 74011250636 HC RX REV CODE- 250/636: Performed by: INTERNAL MEDICINE

## 2020-06-16 PROCEDURE — 77030012965 HC NDL HUBR BBMI -A

## 2020-06-16 PROCEDURE — 96367 TX/PROPH/DG ADDL SEQ IV INF: CPT

## 2020-06-16 PROCEDURE — 96375 TX/PRO/DX INJ NEW DRUG ADDON: CPT

## 2020-06-16 PROCEDURE — 74011000258 HC RX REV CODE- 258: Performed by: INTERNAL MEDICINE

## 2020-06-16 PROCEDURE — 36415 COLL VENOUS BLD VENIPUNCTURE: CPT

## 2020-06-16 PROCEDURE — 85025 COMPLETE CBC W/AUTO DIFF WBC: CPT

## 2020-06-16 RX ORDER — HEPARIN 100 UNIT/ML
300-500 SYRINGE INTRAVENOUS AS NEEDED
Status: ACTIVE | OUTPATIENT
Start: 2020-06-16 | End: 2020-06-16

## 2020-06-16 RX ORDER — SODIUM CHLORIDE 9 MG/ML
10 INJECTION INTRAMUSCULAR; INTRAVENOUS; SUBCUTANEOUS AS NEEDED
Status: ACTIVE | OUTPATIENT
Start: 2020-06-16 | End: 2020-06-16

## 2020-06-16 RX ORDER — DEXAMETHASONE 4 MG/1
TABLET ORAL
Qty: 24 TAB | Refills: 0 | Status: SHIPPED | OUTPATIENT
Start: 2020-06-16 | End: 2020-11-24

## 2020-06-16 RX ORDER — SODIUM CHLORIDE 0.9 % (FLUSH) 0.9 %
10 SYRINGE (ML) INJECTION AS NEEDED
Status: DISPENSED | OUTPATIENT
Start: 2020-06-16 | End: 2020-06-16

## 2020-06-16 RX ORDER — SODIUM CHLORIDE 9 MG/ML
25 INJECTION, SOLUTION INTRAVENOUS CONTINUOUS
Status: DISPENSED | OUTPATIENT
Start: 2020-06-16 | End: 2020-06-16

## 2020-06-16 RX ORDER — PALONOSETRON 0.05 MG/ML
0.25 INJECTION, SOLUTION INTRAVENOUS ONCE
Status: COMPLETED | OUTPATIENT
Start: 2020-06-16 | End: 2020-06-16

## 2020-06-16 RX ADMIN — CISPLATIN 62 MG: 1 INJECTION INTRAVENOUS at 12:04

## 2020-06-16 RX ADMIN — DEXAMETHASONE SODIUM PHOSPHATE 12 MG: 4 INJECTION INTRA-ARTICULAR; INTRALESIONAL; INTRAMUSCULAR; INTRAVENOUS; SOFT TISSUE at 10:35

## 2020-06-16 RX ADMIN — Medication 500 UNITS: at 13:19

## 2020-06-16 RX ADMIN — Medication 10 ML: at 13:19

## 2020-06-16 RX ADMIN — PALONOSETRON HYDROCHLORIDE 0.25 MG: 0.25 INJECTION INTRAVENOUS at 10:31

## 2020-06-16 RX ADMIN — POTASSIUM CHLORIDE: 2 INJECTION, SOLUTION, CONCENTRATE INTRAVENOUS at 10:57

## 2020-06-16 RX ADMIN — SODIUM CHLORIDE 25 ML/HR: 900 INJECTION, SOLUTION INTRAVENOUS at 10:28

## 2020-06-16 RX ADMIN — FOSAPREPITANT 150 MG: 150 INJECTION, POWDER, LYOPHILIZED, FOR SOLUTION INTRAVENOUS at 10:33

## 2020-06-16 RX ADMIN — SODIUM CHLORIDE 10 ML: 9 INJECTION, SOLUTION INTRAMUSCULAR; INTRAVENOUS; SUBCUTANEOUS at 08:58

## 2020-06-16 NOTE — PROGRESS NOTES
[facility] [place] [facility] [place] [address] 100[address] [facility] [phone]  F: [phone]      Reason for Visit:   Yuni Singh is a 52 y.o. female who is seen for follow up of oropharyngeal cancer. Treatment History:   · Biopsy of neck mass 3/25/2020: squamous cell carcinoma  · PET/CT 2585: Hypermetabolic right level 2 cervical lymph node likely corresponds to the biopsy proven squamous cell carcinoma. Focal increased tracer activity at the right tongue base/right vallecula is suspicious for primary malignancy. No evidence of distant metastatic disease. · Direct laryngoscopy by Dr. Sandy Sandifer 2020: Jo Ann Elis tissue in right lateral tongue base without well-defined mass. Biopsies of tongue base with invasive squamous cell carcinoma, moderately differentiated, p16+. 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 Aditya Garcia on 2020  · Initiated curative intent therapy with weekly Cisplatin on 2020    History of Present Illness:   She reports some nausea, but it was several days after treatment, relieved with antiemetics. No pain. No trouble swallowing. Eating and drinking ok. She is a smoker, working on [employer] back. She is unaccompanied today. 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 She lives in [place] with her . PAST HISTORY: The following sections were reviewed and updated in the EMR as appropriate: PMH, SH, FH, Medications, Allergies. Allergies   Allergen Reactions    Tylenol-Codeine #3 [Acetaminophen-Codeine] Nausea Only      Review of Systems: A complete review of systems was obtained, reviewed. Pertinent findings reviewed above. Physical Exam:     There were no vitals taken for this visit.   ECOG PS: 0  General: No distress  Respiratory: Normal respiratory effort  CV: No peripheral edema  Skin: No rashes, ecchymoses, or petechiae  Psych: Alert, oriented, normal mood/affect    Results:     Lab Results   Component Value Date/Time    WBC 6.4 06/16/2020 08:57 AM    HGB 13.2 06/16/2020 08:57 AM    HCT 38.3 06/16/2020 08:57 AM    PLATELET 747 00/79/9470 08:57 AM    MCV 89.7 06/16/2020 08:57 AM    ABS. NEUTROPHILS 4.9 06/16/2020 08:57 AM     Lab Results   Component Value Date/Time    Sodium 138 06/16/2020 08:57 AM    Potassium 4.1 06/16/2020 08:57 AM    Chloride 107 06/16/2020 08:57 AM    CO2 28 06/16/2020 08:57 AM    Glucose 80 06/16/2020 08:57 AM    BUN 18 06/16/2020 08:57 AM    Creatinine 0.69 06/16/2020 08:57 AM    GFR est AA >60 06/16/2020 08:57 AM    GFR est non-AA >60 06/16/2020 08:57 AM    Calcium 8.9 06/16/2020 08:57 AM     Lab Results   Component Value Date/Time    Bilirubin, total 0.3 06/16/2020 08:57 AM    ALT (SGPT) 23 06/16/2020 08:57 AM    Alk. phosphatase 90 06/16/2020 08:57 AM    Protein, total 7.4 06/16/2020 08:57 AM    Albumin 3.6 06/16/2020 08:57 AM    Globulin 3.8 06/16/2020 08:57 AM       Assessment:   1) Squamous cell carcinoma of the right base of tongue  Stage I, p16+  She has biopsy proven disease in her base of tongue and right cervical node. Images personally reviewed with radiologist, node is <2cm. She has Stage I disease based on the assumption that her cancer is largely driven by HPV, which portends a more favorable prognosis. However, she is also a smoker and this could be driving her disease as well. In either case, recommendation is for definitive radiation with concurrent chemotherapy. She is currently receiving radiation with Dr. Oskar Iraheta, and we are treating her with weekly Cisplatin 40mg/m2. She is tolerating therapy well so far. Some delayed nausea, will add dexamethasone after therapy to see if this helps prevent this. Continue with therapy and monitor closely. She will be seen weekly on therapy.      2) Dysphagia  Minimal on swallow study. Seen by speech pathologist.  Brittany Lee will follow. She has met with GI, holding on PEG tube for now. 3) CINV  Continue antiemetics, add dexamethasone on days 2 and 3 of each cycle. 4) Tobacco abuse  I counseled her on the importance of cessation. She is working on cutting back. 5) Emotional Well Being  No psychosocial concerns identified today. Patient has adequate support. Plan:     · Proceed today with C2 of Cisplatin (40mg/m2), given every week concurrently with radiation. · Proceed with radiation therapy with Dr. Gem Romero  · Labs before each treatment: CBC, BMP, Mag  · Antiemetic prophylaxis: Ondansetron, Dexamethasone, Fosaprepitant prior to each chemotherapy. Dexamethasone for three days after therapy at home. · PRN antiemetics at home: Ondansetron, Prochlorperazine, Lorazepam  · Encouraged patient to push fluids (2L+ per day)  · Weekly labs during therapy, with IVFs 1-2x/week  · Return to see me weekly on therapy          Prognosis: Good     This is our best current assessment. Cancers respond differently to treatment. Overall prognosis depends on many factors including other conditions, cancer stage, side effects, and other unforeseen events. Goal of therapy: Curative     Expected response to treatment:  Very good: Anticipate remission (no sign of cancer) and possible cancer cure    Treatment benefits and harms:  We discussed potential short term side effects to include:GI upset, increased infection risk, anemia, alopecia, increased risk of bleeding and fatigue     Long term side effects of treatment:  neuropathy    Quality of life: Quality of life concerns have been addressed. Treatment as outlined is expected to have significant impact on patients quality of life.             Signed By: Jax Crawford MD

## 2020-06-16 NOTE — PROGRESS NOTES
[facility] VISIT NOTE    0845. Pt arrived at [facility] ambulatory and in no distress for C1D8 Cisplatin. Assessment completed, pt c/o mild nausea after treatment and some abdominal gas pain over the weekend. Otherwise, tolerated first treatment well. RCW chest port accessed with . 75 in colon with no difficulty. Positive blood return noted and labs drawn. Pt proceeded to MD kamara. Labs resulted and are within parameters to treat. Medications received:  NS KVO  Aloxi IV  Dexamethasone IV  Emend IV  1 Liter NS with Potassium Chloride and Mag Sulfate  Cisplatin IV    Tolerated treatment well, no adverse reaction noted. Port de-accessed and flushed per protocol. Positive blood return noted. Patient Vitals for the past 12 hrs:   Temp Pulse Resp BP SpO2   06/16/20 1319 -- 68 -- 104/52 --   06/16/20 0848 (!) 96.7 °F (35.9 °C) 61 18 121/63 99 %     Recent Results (from the past 12 hour(s))   CBC WITH 3 PART DIFF    Collection Time: 06/16/20  8:57 AM   Result Value Ref Range    WBC 6.4 3.6 - 11.0 K/uL    RBC 4.27 3.80 - 5.20 M/uL    HGB 13.2 11.5 - 16.0 g/dL    HCT 38.3 35.0 - 47.0 %    MCV 89.7 80.0 - 99.0 FL    MCH 30.9 26.0 - 34.0 PG    MCHC 34.5 30.0 - 36.5 g/dL    RDW 14.7 11.8 - 15.8 %    PLATELET 224 045 - 713 K/uL    NEUTROPHILS 77 (H) 32 - 75 %    MIXED CELLS 8 3.2 - 16.9 %    LYMPHOCYTES 15 12 - 49 %    ABS. NEUTROPHILS 4.9 1.8 - 8.0 K/UL    ABS. MIXED CELLS 0.5 0.2 - 1.2 K/uL    ABS.  LYMPHOCYTES 1.0 0.8 - 3.5 K/UL    DF AUTOMATED     METABOLIC PANEL, COMPREHENSIVE    Collection Time: 06/16/20  8:57 AM   Result Value Ref Range    Sodium 138 136 - 145 mmol/L    Potassium 4.1 3.5 - 5.1 mmol/L    Chloride 107 97 - 108 mmol/L    CO2 28 21 - 32 mmol/L    Anion gap 3 (L) 5 - 15 mmol/L    Glucose 80 65 - 100 mg/dL    BUN 18 6 - 20 MG/DL    Creatinine 0.69 0.55 - 1.02 MG/DL    BUN/Creatinine ratio 26 (H) 12 - 20      GFR est AA >60 >60 ml/min/1.73m2    GFR est non-AA >60 >60 ml/min/1.73m2    Calcium 8.9 8.5 - 10.1 MG/DL    Bilirubin, total 0.3 0.2 - 1.0 MG/DL    ALT (SGPT) 23 12 - 78 U/L    AST (SGOT) 17 15 - 37 U/L    Alk. phosphatase 90 45 - 117 U/L    Protein, total 7.4 6.4 - 8.2 g/dL    Albumin 3.6 3.5 - 5.0 g/dL    Globulin 3.8 2.0 - 4.0 g/dL    A-G Ratio 0.9 (L) 1.1 - 2.2     MAGNESIUM    Collection Time: 06/16/20  8:57 AM   Result Value Ref Range    Magnesium 2.1 1.6 - 2.4 mg/dL     1320. D/C'd from [facility] ambulatory and in no distress.  Next appointment is 6/23/20 at 1:00 pm.

## 2020-06-16 NOTE — PROGRESS NOTES
Vania Small is a 52 y.o. female follow up for oropharyngeal cancer. 1. Have you been to the ER, urgent care clinic since your last visit? Hospitalized since your last visit?no     2. Have you seen or consulted any other health care providers outside of the [address] [place] [address] since your last visit? Include any pap smears or colon screening.  no

## 2020-06-17 ENCOUNTER — TELEPHONE (OUTPATIENT)
Dept: ONCOLOGY | Age: 50
End: 2020-06-17

## 2020-06-17 ENCOUNTER — APPOINTMENT (OUTPATIENT)
Dept: INFUSION THERAPY | Age: 50
End: 2020-06-17

## 2020-06-17 NOTE — TELEPHONE ENCOUNTER
Patient called and was told by Pharmacy on the prescription that Lynn sent in was not being covered but that is all she knows       # [phone]

## 2020-06-18 NOTE — TELEPHONE ENCOUNTER
Completed prior authorization for patients dexamethasone on covermymeds portal. Its decision was prior auth not needed for this medication. Called patients insurance and per her coverage the representative stated she can have a 10 day fill at a local pharmacy but after that it needs to go to a mail order. Called patients local [facility] to see if they could run a 10 day supply and the pharmacist stated the way its written it can only be ran at 12 day supply. Checked the [employer] and the prescription could be $10.68 if patient gets it filled at the nearest [facility]. Called patient and left a detailed voicemail with the above information requesting a call back as soon as possible.

## 2020-06-19 ENCOUNTER — TELEPHONE (OUTPATIENT)
Dept: ONCOLOGY | Age: 50
End: 2020-06-19

## 2020-06-19 ENCOUNTER — APPOINTMENT (OUTPATIENT)
Dept: INFUSION THERAPY | Age: 50
End: 2020-06-19

## 2020-06-19 NOTE — TELEPHONE ENCOUNTER
----- Message from Leafy Brentwood sent at 6/15/2020 11:12 AM EDT -----  Regarding: RE: Can we move treatment on the June 23rd? ?  No bc I didn't have anything available. Changed it to 10  ----- Message -----  From: Alta Pearl: 6/15/2020  11:01 AM EDT  To: Izaiah Aparicio  Subject: RE: Can we move treatment on the June 23rd? ?     This  has not change- has it??? Farhana  ----- Message -----  From: Stephanie Kaye  Sent: 6/9/2020   1:11 PM EDT  To: Farhana Lawton  Subject: RE: Can we move treatment on the June 23rd? ? Doesn't look like I have anything  ----- Message -----  From: Alta Pearl: 6/9/2020  11:41 AM EDT  To: Izaiah Alessandra  Subject: Can we move treatment on the June 23rd? ? On the 23rd treatment is at 1pm, she has radiation at [address] [place] [address], hoping to move this up so she doesn't have to go home and come back   Follow-up Instructions     0 Return in about 1 week (around 6/16/2020) for C2 Cisplatin.

## 2020-06-19 NOTE — TELEPHONE ENCOUNTER
[facility] [place] at [facility]  [address] [facility] [place]: [phone]  F: [phone]    Medical Nutrition Therapy      Reason for nutrition encounter:    Called to check in. She states she is struggling with the nausea. She cannot take the zofran because it makes her sleepy. She takes the compazine which helps some. She started steroids yesterday and feels better today. She is making herself eat. Discussed strategies for managing nausea in effort to improve appetite. She states her weight at radiation has been stable x 2 weeks. Results:   Diagnosis: Oropharyngeal cancer   Started treatment on 6/9/20. Chemotherapy Flowsheet 6/16/2020   Cycle C1D8   Date 6/16/2020   Drug / Regimen Cisplatin   Pre Hydration given   Pre Meds given   Notes given         Wt Readings from Last 5 Encounters:   06/16/20 119 lb 1.6 oz (54 kg)   06/09/20 123 lb 4.8 oz (55.9 kg)   06/09/20 123 lb (55.8 kg)   04/28/20 121 lb 0.5 oz (54.9 kg)   04/23/20 119 lb (54 kg)       Estimated Nutrition Needs:   Calorie Range: 2200-2800kcal/day     Protein Range: 60-75g/day     Fluid Needs:  2000ml     Assessment:   Inadequate oral food or beverage intake  (potential) related to concurrent chemotherapy and radiation as evidence by treatment side effects. Plan:    · Try small, frequent meals. Consume high calorie and protein foods. · Do not go more than a few hours without eating during the day. · Take liquids between meals/sips throughout the day. · Try room temperature or cold foods. · Try dry, starchy and/or salty foods (pretzels, saltines, potatoes, noodle). ·  Try peppermint candies. ·  Avoid strong odors. · Attempt to eat a small amount in the morning before getting out of bed. · Attempt to eat something 30 minutes before laying down to sleep at night. I appreciate the opportunity to participate in Ms. Urmila Dickinson's care.     Signed By: Sera Silva 68 [address], [address] [place] [address] , [address]     Contact: [phone]

## 2020-06-19 NOTE — TELEPHONE ENCOUNTER
Called patient and left a voicemail requesting a return call. Called patients pharmacy to see if dexamethasone had been picked up by patient and pharmacist stated yesterday patient picked up medication.

## 2020-06-19 NOTE — TELEPHONE ENCOUNTER
LVM about patient appointment just to make sure that everyone was on the same page with appt times. Patient is active in [facility] Chart but to call our office with any questions or concerns.

## 2020-06-22 NOTE — PROGRESS NOTES
[facility] [place] at [address], [address] [facility] 100[address] [facility]  [place] Sof[place]: [phone]  F: [phone]      Reason for Visit:   Tiffanie Pierce is a 52 y.o. female who is seen for follow up of oropharyngeal cancer. Treatment History:   · Biopsy of neck mass 3/25/2020: squamous cell carcinoma  · PET/CT 7/4/3992: Hypermetabolic right level 2 cervical lymph node likely corresponds to the biopsy proven squamous cell carcinoma. Focal increased tracer activity at the right tongue base/right vallecula is suspicious for primary malignancy. No evidence of distant metastatic disease. · Direct laryngoscopy by Dr. Bruce Duran 4/13/2020: Nicolle Kelley tissue in right lateral tongue base without well-defined mass. Biopsies of tongue base with invasive squamous cell carcinoma, moderately differentiated, p16+. 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 Yelena Vick on 6/8/2020  · Initiated curative intent therapy with weekly Cisplatin on 6/9/2020    History of Present Illness:   Having more pain in throat, feels like there is a lump in throat making it hard to swallow. She is unable to swallow pills now, crushing them. She is having some pain with swallowing. Ate some oatmeal this morning. She was able to eat potato salad and greens last night. She can tolerate vanilla icecream and all liquids. She has trouble with Gatorade. Mild nausea, no vomiting. She is taking Compazine every 6 hours. No numbness/tingling of extremities. Hearing is a bit muffled, this has been going on since cancer diagnosis. No ringing in ears. She is a smoker, working on cutting back. She is unaccompanied today. 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 She lives in [place] with her .     PAST HISTORY: The following sections were reviewed and updated in the EMR as appropriate: PMH, SH, FH, Medications, Allergies. Allergies   Allergen Reactions    Tylenol-Codeine #3 [Acetaminophen-Codeine] Nausea Only      Review of Systems: A complete review of systems was obtained, reviewed. Pertinent findings reviewed above. Physical Exam:     Visit Vitals  /82   Pulse (!) 58   Temp 98.2 °F (36.8 °C)   Resp 16   Ht 5' 2\" (1.575 m)   Wt 122 lb (55.3 kg)   BMI 22.31 kg/m²     ECOG PS: 0  General: No distress  Respiratory: Normal respiratory effort  CV: No peripheral edema  Skin: No rashes, ecchymoses, or petechiae  Psych: Alert, oriented, normal mood/affect    Results:     Lab Results   Component Value Date/Time    WBC 6.0 06/23/2020 10:06 AM    HGB 12.6 06/23/2020 10:06 AM    HCT 36.7 06/23/2020 10:06 AM    PLATELET 056 60/98/4206 10:06 AM    MCV 90.6 06/23/2020 10:06 AM    ABS. NEUTROPHILS 4.6 06/23/2020 10:06 AM     Lab Results   Component Value Date/Time    Sodium 136 06/23/2020 10:06 AM    Potassium 4.4 06/23/2020 10:06 AM    Chloride 104 06/23/2020 10:06 AM    CO2 28 06/23/2020 10:06 AM    Glucose 77 06/23/2020 10:06 AM    BUN 8 06/23/2020 10:06 AM    Creatinine 0.70 06/23/2020 10:06 AM    GFR est AA >60 06/23/2020 10:06 AM    GFR est non-AA >60 06/23/2020 10:06 AM    Calcium 8.7 06/23/2020 10:06 AM     Lab Results   Component Value Date/Time    Bilirubin, total 0.3 06/23/2020 10:06 AM    ALT (SGPT) 22 06/23/2020 10:06 AM    Alk. phosphatase 82 06/23/2020 10:06 AM    Protein, total 6.9 06/23/2020 10:06 AM    Albumin 3.4 (L) 06/23/2020 10:06 AM    Globulin 3.5 06/23/2020 10:06 AM       Assessment:   1) Squamous cell carcinoma of the right base of tongue  Stage I, p16+  She has biopsy proven disease in her base of tongue and right cervical node. Images personally reviewed with radiologist, node is <2cm. She has Stage I disease based on the assumption that her cancer is largely driven by HPV, which portends a more favorable prognosis.   However, she is also a smoker and this could be driving her disease as well. In either case, recommendation is for definitive radiation with concurrent chemotherapy. She is currently receiving radiation with Dr. Patrick Guzman, and we are treating her with weekly Cisplatin 40mg/m2. Tolerating therapy well with exception of grade 1 nausea. Added Dexamethasone home dosing with week 2 of therapy. Proceed with week 3 today as ordered. She will be seen weekly on therapy. 2) Dysphagia  Minimal on swallow study. Seen by speech pathologist.  Cecilia Bautista will follow. She has met with GI, holding on PEG tube for now. Offered IV fluid support on Thursday/Friday weekly but she declines at this time. Will give Boost supplement samples today as she is starting to not tolerate solid foods. Recommend use twice daily for now. 3) CINV  Continue antiemetics, added dexamethasone on days 2 and 3 of each cycle with C2.    4) Tobacco abuse  I counseled her on the importance of cessation. She is working on cutting back. 5) Emotional Well Being  No psychosocial concerns identified today. Patient has adequate support. Plan:     · Proceed today with C3 of Cisplatin (40mg/m2), given every week concurrently with radiation. · Proceed with radiation therapy with Dr. Patrick Guzman  · Labs before each treatment: CBC, BMP, Mag  · Antiemetic prophylaxis: Ondansetron, Dexamethasone, Fosaprepitant prior to each chemotherapy. Dexamethasone for three days after therapy at home.   · PRN antiemetics at home: Ondansetron, Prochlorperazine, Lorazepam  · Encouraged patient to push fluids (2L+ per day)  · Weekly labs during therapy, with IVFs 1-2x/week  · Return to see me weekly on therapy      Signed By: Bee Diaz NP

## 2020-06-23 ENCOUNTER — HOSPITAL ENCOUNTER (OUTPATIENT)
Dept: INFUSION THERAPY | Age: 50
Discharge: HOME | End: 2020-06-23
Payer: COMMERCIAL

## 2020-06-23 ENCOUNTER — OFFICE VISIT (OUTPATIENT)
Dept: ONCOLOGY | Age: 50
End: 2020-06-23

## 2020-06-23 VITALS
SYSTOLIC BLOOD PRESSURE: 112 MMHG | WEIGHT: 122.9 LBS | HEIGHT: 63 IN | TEMPERATURE: 98.2 F | DIASTOLIC BLOOD PRESSURE: 53 MMHG | BODY MASS INDEX: 21.78 KG/M2 | RESPIRATION RATE: 16 BRPM | HEART RATE: 73 BPM

## 2020-06-23 VITALS
HEIGHT: 62 IN | RESPIRATION RATE: 16 BRPM | DIASTOLIC BLOOD PRESSURE: 82 MMHG | HEART RATE: 58 BPM | SYSTOLIC BLOOD PRESSURE: 124 MMHG | BODY MASS INDEX: 22.45 KG/M2 | TEMPERATURE: 98.2 F | WEIGHT: 122 LBS

## 2020-06-23 DIAGNOSIS — C01 SQUAMOUS CELL CARCINOMA OF BASE OF TONGUE (HCC): Primary | ICD-10-CM

## 2020-06-23 LAB
ALBUMIN SERPL-MCNC: 3.4 G/DL (ref 3.5–5)
ALBUMIN/GLOB SERPL: 1 {RATIO} (ref 1.1–2.2)
ALP SERPL-CCNC: 82 U/L (ref 45–117)
ALT SERPL-CCNC: 22 U/L (ref 12–78)
ANION GAP SERPL CALC-SCNC: 4 MMOL/L (ref 5–15)
AST SERPL-CCNC: 19 U/L (ref 15–37)
BASO+EOS+MONOS # BLD AUTO: 0.5 K/UL (ref 0.2–1.2)
BASO+EOS+MONOS NFR BLD AUTO: 8 % (ref 3.2–16.9)
BILIRUB SERPL-MCNC: 0.3 MG/DL (ref 0.2–1)
BUN SERPL-MCNC: 8 MG/DL (ref 6–20)
BUN/CREAT SERPL: 11 (ref 12–20)
CALCIUM SERPL-MCNC: 8.7 MG/DL (ref 8.5–10.1)
CHLORIDE SERPL-SCNC: 104 MMOL/L (ref 97–108)
CO2 SERPL-SCNC: 28 MMOL/L (ref 21–32)
CREAT SERPL-MCNC: 0.7 MG/DL (ref 0.55–1.02)
DIFFERENTIAL METHOD BLD: ABNORMAL
ERYTHROCYTE [DISTWIDTH] IN BLOOD BY AUTOMATED COUNT: 14.5 % (ref 11.8–15.8)
GLOBULIN SER CALC-MCNC: 3.5 G/DL (ref 2–4)
GLUCOSE SERPL-MCNC: 77 MG/DL (ref 65–100)
HCT VFR BLD AUTO: 36.7 % (ref 35–47)
HGB BLD-MCNC: 12.6 G/DL (ref 11.5–16)
LYMPHOCYTES # BLD: 0.9 K/UL (ref 0.8–3.5)
LYMPHOCYTES NFR BLD: 14 % (ref 12–49)
MAGNESIUM SERPL-MCNC: 2.7 MG/DL (ref 1.6–2.4)
MCH RBC QN AUTO: 31.1 PG (ref 26–34)
MCHC RBC AUTO-ENTMCNC: 34.3 G/DL (ref 30–36.5)
MCV RBC AUTO: 90.6 FL (ref 80–99)
NEUTS SEG # BLD: 4.6 K/UL (ref 1.8–8)
NEUTS SEG NFR BLD: 78 % (ref 32–75)
PLATELET # BLD AUTO: 245 K/UL (ref 150–400)
POTASSIUM SERPL-SCNC: 4.4 MMOL/L (ref 3.5–5.1)
PROT SERPL-MCNC: 6.9 G/DL (ref 6.4–8.2)
RBC # BLD AUTO: 4.05 M/UL (ref 3.8–5.2)
SODIUM SERPL-SCNC: 136 MMOL/L (ref 136–145)
WBC # BLD AUTO: 6 K/UL (ref 3.6–11)

## 2020-06-23 PROCEDURE — 96413 CHEMO IV INFUSION 1 HR: CPT

## 2020-06-23 PROCEDURE — 83735 ASSAY OF MAGNESIUM: CPT

## 2020-06-23 PROCEDURE — 36415 COLL VENOUS BLD VENIPUNCTURE: CPT

## 2020-06-23 PROCEDURE — 36593 DECLOT VASCULAR DEVICE: CPT

## 2020-06-23 PROCEDURE — 77030016057 HC NDL HUBR APOL -B

## 2020-06-23 PROCEDURE — 74011250636 HC RX REV CODE- 250/636: Performed by: INTERNAL MEDICINE

## 2020-06-23 PROCEDURE — 74011000258 HC RX REV CODE- 258: Performed by: INTERNAL MEDICINE

## 2020-06-23 PROCEDURE — 85025 COMPLETE CBC W/AUTO DIFF WBC: CPT

## 2020-06-23 PROCEDURE — 96367 TX/PROPH/DG ADDL SEQ IV INF: CPT

## 2020-06-23 PROCEDURE — 96375 TX/PRO/DX INJ NEW DRUG ADDON: CPT

## 2020-06-23 PROCEDURE — 80053 COMPREHEN METABOLIC PANEL: CPT

## 2020-06-23 PROCEDURE — 74011000250 HC RX REV CODE- 250: Performed by: INTERNAL MEDICINE

## 2020-06-23 RX ORDER — HEPARIN 100 UNIT/ML
300-500 SYRINGE INTRAVENOUS AS NEEDED
Status: ACTIVE | OUTPATIENT
Start: 2020-06-23 | End: 2020-06-23

## 2020-06-23 RX ORDER — SODIUM CHLORIDE 0.9 % (FLUSH) 0.9 %
10 SYRINGE (ML) INJECTION AS NEEDED
Status: DISPENSED | OUTPATIENT
Start: 2020-06-23 | End: 2020-06-23

## 2020-06-23 RX ORDER — PALONOSETRON 0.05 MG/ML
0.25 INJECTION, SOLUTION INTRAVENOUS ONCE
Status: COMPLETED | OUTPATIENT
Start: 2020-06-23 | End: 2020-06-23

## 2020-06-23 RX ORDER — SODIUM CHLORIDE 9 MG/ML
25 INJECTION, SOLUTION INTRAVENOUS CONTINUOUS
Status: DISPENSED | OUTPATIENT
Start: 2020-06-23 | End: 2020-06-23

## 2020-06-23 RX ORDER — SODIUM CHLORIDE 9 MG/ML
10 INJECTION INTRAMUSCULAR; INTRAVENOUS; SUBCUTANEOUS AS NEEDED
Status: ACTIVE | OUTPATIENT
Start: 2020-06-23 | End: 2020-06-23

## 2020-06-23 RX ADMIN — DEXAMETHASONE SODIUM PHOSPHATE 12 MG: 4 INJECTION, SOLUTION INTRA-ARTICULAR; INTRALESIONAL; INTRAMUSCULAR; INTRAVENOUS; SOFT TISSUE at 12:06

## 2020-06-23 RX ADMIN — FOSAPREPITANT 150 MG: 150 INJECTION, POWDER, LYOPHILIZED, FOR SOLUTION INTRAVENOUS at 12:06

## 2020-06-23 RX ADMIN — PALONOSETRON HYDROCHLORIDE 0.25 MG: 0.25 INJECTION INTRAVENOUS at 12:05

## 2020-06-23 RX ADMIN — Medication 500 UNITS: at 14:46

## 2020-06-23 RX ADMIN — POTASSIUM CHLORIDE: 2 INJECTION, SOLUTION, CONCENTRATE INTRAVENOUS at 12:35

## 2020-06-23 RX ADMIN — ALTEPLASE 2 MG: 2.2 INJECTION, POWDER, LYOPHILIZED, FOR SOLUTION INTRAVENOUS at 10:29

## 2020-06-23 RX ADMIN — CISPLATIN 62 MG: 1 INJECTION INTRAVENOUS at 13:43

## 2020-06-23 RX ADMIN — SODIUM CHLORIDE 25 ML/HR: 900 INJECTION, SOLUTION INTRAVENOUS at 12:05

## 2020-06-23 NOTE — PROGRESS NOTES
Agatha Jovel is a 52 y.o. female follow up for oropharyngeal cancer. 1. Have you been to the ER, urgent care clinic since your last visit? Hospitalized since your last visit?no     2. Have you seen or consulted any other health care providers outside of the [address] [place] [address] since your last visit? Include any pap smears or colon screening.  No    Vitals 6/23/2020   Blood Pressure 124/82   Pulse 58   Temp 98.2   Resp 16   Height 5' 2.992\"   Weight 122 lb 14.4 oz   SpO2    BSA 1.57 m2   BMI 21.78 kg/m2

## 2020-06-23 NOTE — PROGRESS NOTES
\[facility]\"" Progress Note    Date: 2020    Name: Dulce Fillers    MRN: [id number]         : 1970    Ms. Can Rubin Arrived ambulatory and in no distress for cycle 1 day 15 of Cisplatin regimen. Assessment was completed, no acute issues at this time, no new complaints voiced pt has nausea at times but is relieved with nausea medications difficulty swallowing due to XRT. R chest port accessed without difficulty,no bld return positive free flow cathflo instilled. labs drawn peripherally and in process. Patient returned from MD visit positive blood in port. Chemotherapy Flowsheet 2020   Cycle C1D15   Date 2020   Drug / Regimen Cisplatin   Pre Hydration -   Pre Meds -   Notes -          Proceeded to appt with Dr. Gerhard Gaspar    Ms. Jacoby Real vitals were reviewed. Visit Vitals  /82   Pulse (!) 58   Temp 98.2 °F (36.8 °C)   Resp 16   Ht 5' 2.99\" (1.6 m)   Wt 55.7 kg (122 lb 14.4 oz)   BMI 21.78 kg/m²       Lab results were obtained and reviewed. Recent Results (from the past 12 hour(s))   CBC WITH 3 PART DIFF    Collection Time: 20 10:06 AM   Result Value Ref Range    WBC 6.0 3.6 - 11.0 K/uL    RBC 4.05 3.80 - 5.20 M/uL    HGB 12.6 11.5 - 16.0 g/dL    HCT 36.7 35.0 - 47.0 %    MCV 90.6 80.0 - 99.0 FL    MCH 31.1 26.0 - 34.0 PG    MCHC 34.3 30.0 - 36.5 g/dL    RDW 14.5 11.8 - 15.8 %    PLATELET 668 901 - 985 K/uL    NEUTROPHILS 78 (H) 32 - 75 %    MIXED CELLS 8 3.2 - 16.9 %    LYMPHOCYTES 14 12 - 49 %    ABS. NEUTROPHILS 4.6 1.8 - 8.0 K/UL    ABS. MIXED CELLS 0.5 0.2 - 1.2 K/uL    ABS.  LYMPHOCYTES 0.9 0.8 - 3.5 K/UL    DF AUTOMATED       Recent Results (from the past 12 hour(s))   CBC WITH 3 PART DIFF    Collection Time: 20 10:06 AM   Result Value Ref Range    WBC 6.0 3.6 - 11.0 K/uL    RBC 4.05 3.80 - 5.20 M/uL    HGB 12.6 11.5 - 16.0 g/dL    HCT 36.7 35.0 - 47.0 %    MCV 90.6 80.0 - 99.0 FL    MCH 31.1 26.0 - 34.0 PG    MCHC 34.3 30.0 - 36.5 g/dL    RDW 14.5 11.8 - 15.8 % PLATELET 173 807 - 767 K/uL    NEUTROPHILS 78 (H) 32 - 75 %    MIXED CELLS 8 3.2 - 16.9 %    LYMPHOCYTES 14 12 - 49 %    ABS. NEUTROPHILS 4.6 1.8 - 8.0 K/UL    ABS. MIXED CELLS 0.5 0.2 - 1.2 K/uL    ABS. LYMPHOCYTES 0.9 0.8 - 3.5 K/UL    DF AUTOMATED     METABOLIC PANEL, COMPREHENSIVE    Collection Time: 06/23/20 10:06 AM   Result Value Ref Range    Sodium 136 136 - 145 mmol/L    Potassium 4.4 3.5 - 5.1 mmol/L    Chloride 104 97 - 108 mmol/L    CO2 28 21 - 32 mmol/L    Anion gap 4 (L) 5 - 15 mmol/L    Glucose 77 65 - 100 mg/dL    BUN 8 6 - 20 MG/DL    Creatinine 0.70 0.55 - 1.02 MG/DL    BUN/Creatinine ratio 11 (L) 12 - 20      GFR est AA >60 >60 ml/min/1.73m2    GFR est non-AA >60 >60 ml/min/1.73m2    Calcium 8.7 8.5 - 10.1 MG/DL    Bilirubin, total 0.3 0.2 - 1.0 MG/DL    ALT (SGPT) 22 12 - 78 U/L    AST (SGOT) 19 15 - 37 U/L    Alk. phosphatase 82 45 - 117 U/L    Protein, total 6.9 6.4 - 8.2 g/dL    Albumin 3.4 (L) 3.5 - 5.0 g/dL    Globulin 3.5 2.0 - 4.0 g/dL    A-G Ratio 1.0 (L) 1.1 - 2.2     MAGNESIUM    Collection Time: 06/23/20 10:06 AM   Result Value Ref Range    Magnesium 2.7 (H) 1.6 - 2.4 mg/dL     Pre-medications  were administered as ordered and chemotherapy was initiated.      Medications Administered     0.9% sodium chloride 1,000 mL with potassium chloride 10 mEq, magnesium sulfate 2 g infusion     Admin Date  06/23/2020 Action  Given Dose   Rate  1,000 mL/hr Route  IntraVENous Administered By  Chan Sebastian          0.9% sodium chloride infusion     Admin Date  06/23/2020 Action  New Bag Dose  25 mL/hr Rate  25 mL/hr Route  IntraVENous Administered By  Chan Sebastian          alteplase (CATHFLO) 2 mg in sterile water (preservative free) 2 mL injection     Admin Date  06/23/2020 Action  Given Dose  2 mg Route  InterCATHeter Administered By  Chan Sebastian          CISplatin (PLATINOL) 62 mg in 0.9% sodium chloride 250 mL, overfill volume 25 mL chemo infusion     Admin Date  06/23/2020 Action  New Bag Dose  62 mg Rate  337 mL/hr Route  IntraVENous Administered By  Jessica Rodrigez          dexamethasone (DECADRON) 12 mg in 0.9% sodium chloride 50 mL IVPB     Admin Date  06/23/2020 Action  Given Dose  12 mg Route  IntraVENous Administered By  Jessica Loveless          fosaprepitant (EMEND) 150 mg in 0.9% sodium chloride 150 mL IVPB     Admin Date  06/23/2020 Action  Given Dose  150 mg Rate  450 mL/hr Route  IntraVENous Administered By  Jessica Loveless          heparin (porcine) pf 300-500 Units     Admin Date  06/23/2020 Action  Given Dose  500 Units Route  InterCATHeter Administered By  Jessica Loveless          palonosetron HCl (ALOXI) injection 0.25 mg     Admin Date  06/23/2020 Action  Given Dose  0.25 mg Route  IntraVENous Administered By  Jessica Loveless                  Ms. Doss Knows tolerated treatment well and was discharged from [place] [address] in stable condition. She is to return on 6/30 for her next appointment.     Ashlee Marrero  June 23, 2020

## 2020-06-24 ENCOUNTER — APPOINTMENT (OUTPATIENT)
Dept: INFUSION THERAPY | Age: 50
End: 2020-06-24

## 2020-06-26 ENCOUNTER — TELEPHONE (OUTPATIENT)
Dept: ONCOLOGY | Age: 50
End: 2020-06-26

## 2020-06-26 ENCOUNTER — APPOINTMENT (OUTPATIENT)
Dept: INFUSION THERAPY | Age: 50
End: 2020-06-26

## 2020-06-26 NOTE — TELEPHONE ENCOUNTER
[facility] at [address] [facility] [address] [facility]  Ashley Ghotra: [phone]  F: [phone]    Medical Nutrition Therapy      Reason for nutrition encounter:    Called to check in. She says her throat is sore and feels like she has a lump in her throat. She did get some Ensure strawberry which she likes. She reports altered taste, nothing taste right. Discussed strategies for taste changes. Results:   Diagnosis: Oropharyngeal cancer   Started treatment on 6/9/20. Chemotherapy Flowsheet 6/23/2020   Cycle C1D15   Date 6/23/2020   Drug / Regimen Cisplatin   Pre Hydration -   Pre Meds -   Notes -       Wt Readings from Last 5 Encounters:   06/23/20 122 lb (55.3 kg)   06/23/20 122 lb 14.4 oz (55.7 kg)   06/16/20 119 lb 1.6 oz (54 kg)   06/09/20 123 lb 4.8 oz (55.9 kg)   06/09/20 123 lb (55.8 kg)     Estimated Nutrition Needs:   Calorie Range: 2200-2800kcal/day     Protein Range: 60-75g/day     Fluid Needs:  2000ml     Assessment:   Inadequate oral food or beverage intake  (potential) related to concurrent chemotherapy and radiation as evidence by treatment side effects. Plan:    · Maintain good oral hygiene. Rinse mouth with warm water and baking soda. · Avoid mouthwashes or rinses containing alcohol. · Eater cooler foods rather than warm or hot foods or at room temperature. · Try to improve flavor by using marinades, salad dressing, herbs, spices, vinegar and sweeteners. · Try tart foods and drinks. · Use plastic utensils if metallic taste are a problem. · Avoid foods that come from a can or metal container. I appreciate the opportunity to participate in Ms. Umrila Dickinson's care.     Signed By: Miguel Sevilla, [address], [address] [place] [address] , [address]     Contact: [phone]

## 2020-06-29 ENCOUNTER — HOSPITAL ENCOUNTER (OUTPATIENT)
Dept: RADIATION THERAPY | Age: 50
Discharge: HOME | End: 2020-06-29

## 2020-06-29 DIAGNOSIS — C01 SQUAMOUS CELL CARCINOMA OF BASE OF TONGUE (HCC): Primary | ICD-10-CM

## 2020-06-29 DIAGNOSIS — B37.0 THRUSH, ORAL: ICD-10-CM

## 2020-06-29 DIAGNOSIS — R11.0 NAUSEA: ICD-10-CM

## 2020-06-29 RX ORDER — FLUCONAZOLE 10 MG/ML
POWDER, FOR SUSPENSION ORAL
Qty: 150 ML | Refills: 0 | Status: SHIPPED | OUTPATIENT
Start: 2020-06-29 | End: 2020-07-12

## 2020-06-29 RX ORDER — PROMETHAZINE HYDROCHLORIDE 25 MG/1
25 TABLET ORAL
Qty: 60 TAB | Refills: 3 | Status: SHIPPED | OUTPATIENT
Start: 2020-06-29 | End: 2020-11-24

## 2020-06-29 NOTE — PROGRESS NOTES
[facility] at [address], [address] [place] [address]  Dheeraj Kindra: [phone]  F: [phone]      Reason for Visit:   Rajesh Niño is a 52 y.o. female who is seen for follow up of oropharyngeal cancer. Treatment History:   · Biopsy of neck mass 3/25/2020: squamous cell carcinoma  · PET/CT 9/2/1071: Hypermetabolic right level 2 cervical lymph node likely corresponds to the biopsy proven squamous cell carcinoma. Focal increased tracer activity at the right tongue base/right vallecula is suspicious for primary malignancy. No evidence of distant metastatic disease. · Direct laryngoscopy by Dr. Mike Mendoza 4/13/2020: [place] tissue in right lateral tongue base without well-defined mass. Biopsies of tongue base with invasive squamous cell carcinoma, moderately differentiated, p16+. 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 Tiffanie Rm on 6/8/2020  · Initiated curative intent therapy with weekly Cisplatin on 6/9/2020    History of Present Illness:   Vomiting since last visit. Started vomiting last week. Nausea is worse     Yesterday had water, ginger ale, and 2 Ensure. She was able to get chicken broth in last week, but not in the last few days. She was able to eat few bites of eggs on Sunday, but this took a long time and was difficult to get down. Not able to have any solid foods now. Mild throat pain. No headache or dizziness. Bowels are moving well. Tired more this week. She is a smoker, working on cutting back. She is unaccompanied today. 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 She lives in [facility] with her . PAST HISTORY: The following sections were reviewed and updated in the EMR as appropriate: PMH, SH, FH, Medications, Allergies.     Allergies   Allergen Reactions    Tylenol-Codeine #3 [Acetaminophen-Codeine] Nausea Only      Review of Systems: A complete review of systems was obtained, reviewed. Pertinent findings reviewed above. Physical Exam:     Visit Vitals  /78   Pulse 60   Temp 98.7 °F (37.1 °C)   Resp 16   Ht 5' 2\" (1.575 m)   Wt 119 lb (54 kg)   BMI 21.77 kg/m²     ECOG PS: 0  General: No distress  Respiratory: Normal respiratory effort  CV: No peripheral edema  Skin: No rashes, ecchymoses, or petechiae  Psych: Alert, oriented, normal mood/affect    Results:     Lab Results   Component Value Date/Time    WBC 5.3 06/30/2020 10:08 AM    HGB 12.5 06/30/2020 10:08 AM    HCT 36.2 06/30/2020 10:08 AM    PLATELET 535 04/97/7656 10:08 AM    MCV 90.0 06/30/2020 10:08 AM    ABS. NEUTROPHILS 4.2 06/30/2020 10:08 AM     Lab Results   Component Value Date/Time    Sodium 133 (L) 06/30/2020 10:08 AM    Potassium 4.1 06/30/2020 10:08 AM    Chloride 101 06/30/2020 10:08 AM    CO2 29 06/30/2020 10:08 AM    Glucose 93 06/30/2020 10:08 AM    BUN 13 06/30/2020 10:08 AM    Creatinine 0.66 06/30/2020 10:08 AM    GFR est AA >60 06/30/2020 10:08 AM    GFR est non-AA >60 06/30/2020 10:08 AM    Calcium 8.8 06/30/2020 10:08 AM     Lab Results   Component Value Date/Time    Bilirubin, total 0.5 06/30/2020 10:08 AM    ALT (SGPT) 20 06/30/2020 10:08 AM    Alk. phosphatase 81 06/30/2020 10:08 AM    Protein, total 7.3 06/30/2020 10:08 AM    Albumin 3.4 (L) 06/30/2020 10:08 AM    Globulin 3.9 06/30/2020 10:08 AM       Assessment:   1) Squamous cell carcinoma of the right base of tongue  Stage I, p16+  She has biopsy proven disease in her base of tongue and right cervical node. Images personally reviewed with radiologist, node is <2cm. She has Stage I disease based on the assumption that her cancer is largely driven by HPV, which portends a more favorable prognosis. However, she is also a smoker and this could be driving her disease as well.   In either case, recommendation is for definitive radiation with concurrent chemotherapy. She is currently receiving radiation with Dr. Gem Romero, and we are treating her with weekly Cisplatin 40mg/m2. Tolerating therapy well with exception of grade 1 nausea, grade 1 dysguesia and loss of appetite. Added Dexamethasone home dosing with week 2 of therapy. Proceed with week 4 today as ordered. She will be seen weekly on therapy. 2) Dysphagia  Minimal on swallow study. Seen by speech pathologist.  Brittany Lee will follow. She has met with GI about possible PEG placement. Worry about her intake in the last week. Recommend PEG tube placement now as she is not getting enough calories in by mouth. Estimating 700-800  Kcal daily in the last week. Progressive dysphagia unable to meet nutrition needs by mouth. Feeding tube will be primary source of nutrition. She agrees to have this placed. 3) CINV  Continue antiemetics, added dexamethasone on days 2 and 3 of each cycle with C2. She did not  Phenergan that was ordered by Dr. Gem Romero yesterday. She has Compazine on hand. 4) Tobacco abuse  I counseled her on the importance of cessation. She is working on cutting back. 5) Emotional Well Being  No psychosocial concerns identified today. Patient has adequate support. Plan:     · Proceed today with C4 of Cisplatin (40mg/m2), given every week concurrently with radiation. · Proceed with radiation therapy with Dr. Gem Romero  · Labs before each treatment: CBC, BMP, Mag  · Antiemetic prophylaxis: Ondansetron, Dexamethasone, Fosaprepitant prior to each chemotherapy. Dexamethasone for three days after therapy at home.   · PRN antiemetics at home: Ondansetron, Prochlorperazine, Lorazepam  · Encouraged patient to push fluids (2L+ per day)  · Weekly labs during therapy, with IVFs 2x/week  · PEG placement with GI  · Return to see me weekly on therapy      Signed By: Jxa Crawford MD

## 2020-06-30 ENCOUNTER — HOSPITAL ENCOUNTER (OUTPATIENT)
Dept: INFUSION THERAPY | Age: 50
Discharge: HOME | End: 2020-06-30
Payer: COMMERCIAL

## 2020-06-30 ENCOUNTER — OFFICE VISIT (OUTPATIENT)
Dept: ONCOLOGY | Age: 50
End: 2020-06-30

## 2020-06-30 VITALS
WEIGHT: 119.4 LBS | HEIGHT: 63 IN | SYSTOLIC BLOOD PRESSURE: 118 MMHG | TEMPERATURE: 98.7 F | DIASTOLIC BLOOD PRESSURE: 78 MMHG | BODY MASS INDEX: 21.16 KG/M2 | HEART RATE: 60 BPM | RESPIRATION RATE: 16 BRPM

## 2020-06-30 VITALS
HEIGHT: 62 IN | WEIGHT: 119 LBS | DIASTOLIC BLOOD PRESSURE: 78 MMHG | HEART RATE: 60 BPM | SYSTOLIC BLOOD PRESSURE: 118 MMHG | BODY MASS INDEX: 21.9 KG/M2 | TEMPERATURE: 98.7 F | RESPIRATION RATE: 16 BRPM

## 2020-06-30 DIAGNOSIS — C01 SQUAMOUS CELL CARCINOMA OF BASE OF TONGUE (HCC): Primary | ICD-10-CM

## 2020-06-30 LAB
ALBUMIN SERPL-MCNC: 3.4 G/DL (ref 3.5–5)
ALBUMIN/GLOB SERPL: 0.9 {RATIO} (ref 1.1–2.2)
ALP SERPL-CCNC: 81 U/L (ref 45–117)
ALT SERPL-CCNC: 20 U/L (ref 12–78)
ANION GAP SERPL CALC-SCNC: 3 MMOL/L (ref 5–15)
AST SERPL-CCNC: 16 U/L (ref 15–37)
BASO+EOS+MONOS # BLD AUTO: 0.5 K/UL (ref 0.2–1.2)
BASO+EOS+MONOS NFR BLD AUTO: 9 % (ref 3.2–16.9)
BILIRUB SERPL-MCNC: 0.5 MG/DL (ref 0.2–1)
BUN SERPL-MCNC: 13 MG/DL (ref 6–20)
BUN/CREAT SERPL: 20 (ref 12–20)
CALCIUM SERPL-MCNC: 8.8 MG/DL (ref 8.5–10.1)
CHLORIDE SERPL-SCNC: 101 MMOL/L (ref 97–108)
CO2 SERPL-SCNC: 29 MMOL/L (ref 21–32)
CREAT SERPL-MCNC: 0.66 MG/DL (ref 0.55–1.02)
DIFFERENTIAL METHOD BLD: ABNORMAL
ERYTHROCYTE [DISTWIDTH] IN BLOOD BY AUTOMATED COUNT: 14 % (ref 11.8–15.8)
GLOBULIN SER CALC-MCNC: 3.9 G/DL (ref 2–4)
GLUCOSE SERPL-MCNC: 93 MG/DL (ref 65–100)
HCT VFR BLD AUTO: 36.2 % (ref 35–47)
HGB BLD-MCNC: 12.5 G/DL (ref 11.5–16)
LYMPHOCYTES # BLD: 0.6 K/UL (ref 0.8–3.5)
LYMPHOCYTES NFR BLD: 11 % (ref 12–49)
MAGNESIUM SERPL-MCNC: 2.3 MG/DL (ref 1.6–2.4)
MCH RBC QN AUTO: 31.1 PG (ref 26–34)
MCHC RBC AUTO-ENTMCNC: 34.5 G/DL (ref 30–36.5)
MCV RBC AUTO: 90 FL (ref 80–99)
NEUTS SEG # BLD: 4.2 K/UL (ref 1.8–8)
NEUTS SEG NFR BLD: 80 % (ref 32–75)
PLATELET # BLD AUTO: 194 K/UL (ref 150–400)
POTASSIUM SERPL-SCNC: 4.1 MMOL/L (ref 3.5–5.1)
PROT SERPL-MCNC: 7.3 G/DL (ref 6.4–8.2)
RBC # BLD AUTO: 4.02 M/UL (ref 3.8–5.2)
SODIUM SERPL-SCNC: 133 MMOL/L (ref 136–145)
WBC # BLD AUTO: 5.3 K/UL (ref 3.6–11)

## 2020-06-30 PROCEDURE — 74011250636 HC RX REV CODE- 250/636: Performed by: INTERNAL MEDICINE

## 2020-06-30 PROCEDURE — 36415 COLL VENOUS BLD VENIPUNCTURE: CPT

## 2020-06-30 PROCEDURE — 96367 TX/PROPH/DG ADDL SEQ IV INF: CPT

## 2020-06-30 PROCEDURE — 96413 CHEMO IV INFUSION 1 HR: CPT

## 2020-06-30 PROCEDURE — 85025 COMPLETE CBC W/AUTO DIFF WBC: CPT

## 2020-06-30 PROCEDURE — 74011000258 HC RX REV CODE- 258: Performed by: INTERNAL MEDICINE

## 2020-06-30 PROCEDURE — 96375 TX/PRO/DX INJ NEW DRUG ADDON: CPT

## 2020-06-30 PROCEDURE — 83735 ASSAY OF MAGNESIUM: CPT

## 2020-06-30 PROCEDURE — 80053 COMPREHEN METABOLIC PANEL: CPT

## 2020-06-30 RX ORDER — SODIUM CHLORIDE 9 MG/ML
10 INJECTION INTRAMUSCULAR; INTRAVENOUS; SUBCUTANEOUS AS NEEDED
Status: ACTIVE | OUTPATIENT
Start: 2020-06-30 | End: 2020-06-30

## 2020-06-30 RX ORDER — SODIUM CHLORIDE 9 MG/ML
25 INJECTION, SOLUTION INTRAVENOUS CONTINUOUS
Status: DISPENSED | OUTPATIENT
Start: 2020-06-30 | End: 2020-06-30

## 2020-06-30 RX ORDER — HEPARIN 100 UNIT/ML
300-500 SYRINGE INTRAVENOUS AS NEEDED
Status: ACTIVE | OUTPATIENT
Start: 2020-06-30 | End: 2020-06-30

## 2020-06-30 RX ORDER — PALONOSETRON 0.05 MG/ML
0.25 INJECTION, SOLUTION INTRAVENOUS ONCE
Status: COMPLETED | OUTPATIENT
Start: 2020-06-30 | End: 2020-06-30

## 2020-06-30 RX ORDER — SODIUM CHLORIDE 0.9 % (FLUSH) 0.9 %
10 SYRINGE (ML) INJECTION AS NEEDED
Status: DISPENSED | OUTPATIENT
Start: 2020-06-30 | End: 2020-06-30

## 2020-06-30 RX ADMIN — CISPLATIN 62 MG: 1 INJECTION INTRAVENOUS at 12:59

## 2020-06-30 RX ADMIN — DEXAMETHASONE SODIUM PHOSPHATE 12 MG: 10 INJECTION, SOLUTION INTRAMUSCULAR; INTRAVENOUS at 11:26

## 2020-06-30 RX ADMIN — FOSAPREPITANT 150 MG: 150 INJECTION, POWDER, LYOPHILIZED, FOR SOLUTION INTRAVENOUS at 11:26

## 2020-06-30 RX ADMIN — Medication 10 ML: at 14:22

## 2020-06-30 RX ADMIN — Medication 500 UNITS: at 14:22

## 2020-06-30 RX ADMIN — SODIUM CHLORIDE 25 ML/HR: 900 INJECTION, SOLUTION INTRAVENOUS at 11:25

## 2020-06-30 RX ADMIN — PALONOSETRON HYDROCHLORIDE 0.25 MG: 0.25 INJECTION INTRAVENOUS at 11:24

## 2020-06-30 RX ADMIN — POTASSIUM CHLORIDE: 2 INJECTION, SOLUTION, CONCENTRATE INTRAVENOUS at 11:52

## 2020-06-30 NOTE — PROGRESS NOTES
Kim Gimenez is a 52 y.o. female follow up for oropharyngeal cancer. 1. Have you been to the ER, urgent care clinic since your last visit? Hospitalized since your last visit?no     2. Have you seen or consulted any other health care providers outside of the [address] [place] [address] since your last visit? Include any pap smears or colon screening.  No    Vitals 6/30/2020   Blood Pressure 118/78   Pulse 60   Temp 98.7   Resp 16   Height 5' 2.992\"   Weight 119 lb 6.4 oz   SpO2    BSA 1.55 m2   BMI 21.16 kg/m2

## 2020-06-30 NOTE — PROGRESS NOTES
[facility] Progress Note    Date: 2020    Name: Lindy Trinh    MRN: [id number]         : 1970    Ms. Harinder Ambrose Arrived ambulatory and in no distress for cycle 1 day 22 of Cisplatin regimen. Assessment was completed, pt having difficulty swallowing and excess secretions leading to nausea. R chest port accessed without difficulty, labs drawn and in process. Chemotherapy Flowsheet 2020   Cycle C1D22   Date 2020   Drug / Regimen Cisplatin   Pre Hydration -   Pre Meds -   Notes -         Proceeded to appt with Dr.Raddin David Brittnee Montelongo vitals were reviewed. Visit Vitals  /78   Pulse 60   Temp 98.7 °F (37.1 °C)   Resp 16   Ht 5' 2.99\" (1.6 m)   Wt 54.2 kg (119 lb 6.4 oz)   BMI 21.16 kg/m²       Lab results were obtained and reviewed. Recent Results (from the past 12 hour(s))   CBC WITH 3 PART DIFF    Collection Time: 20 10:08 AM   Result Value Ref Range    WBC 5.3 3.6 - 11.0 K/uL    RBC 4.02 3.80 - 5.20 M/uL    HGB 12.5 11.5 - 16.0 g/dL    HCT 36.2 35.0 - 47.0 %    MCV 90.0 80.0 - 99.0 FL    MCH 31.1 26.0 - 34.0 PG    MCHC 34.5 30.0 - 36.5 g/dL    RDW 14.0 11.8 - 15.8 %    PLATELET 362 547 - 176 K/uL    NEUTROPHILS 80 (H) 32 - 75 %    MIXED CELLS 9 3.2 - 16.9 %    LYMPHOCYTES 11 (L) 12 - 49 %    ABS. NEUTROPHILS 4.2 1.8 - 8.0 K/UL    ABS. MIXED CELLS 0.5 0.2 - 1.2 K/uL    ABS.  LYMPHOCYTES 0.6 (L) 0.8 - 3.5 K/UL    DF AUTOMATED     METABOLIC PANEL, COMPREHENSIVE    Collection Time: 20 10:08 AM   Result Value Ref Range    Sodium 133 (L) 136 - 145 mmol/L    Potassium 4.1 3.5 - 5.1 mmol/L    Chloride 101 97 - 108 mmol/L    CO2 29 21 - 32 mmol/L    Anion gap 3 (L) 5 - 15 mmol/L    Glucose 93 65 - 100 mg/dL    BUN 13 6 - 20 MG/DL    Creatinine 0.66 0.55 - 1.02 MG/DL    BUN/Creatinine ratio 20 12 - 20      GFR est AA >60 >60 ml/min/1.73m2    GFR est non-AA >60 >60 ml/min/1.73m2    Calcium 8.8 8.5 - 10.1 MG/DL    Bilirubin, total 0.5 0.2 - 1.0 MG/DL    ALT (SGPT) 20 12 - 78 U/L AST (SGOT) 16 15 - 37 U/L    Alk. phosphatase 81 45 - 117 U/L    Protein, total 7.3 6.4 - 8.2 g/dL    Albumin 3.4 (L) 3.5 - 5.0 g/dL    Globulin 3.9 2.0 - 4.0 g/dL    A-G Ratio 0.9 (L) 1.1 - 2.2     MAGNESIUM    Collection Time: 06/30/20 10:08 AM   Result Value Ref Range    Magnesium 2.3 1.6 - 2.4 mg/dL     Pre-medications  were administered as ordered and chemotherapy was initiated.      Medications Administered     0.9% sodium chloride 1,000 mL with potassium chloride 10 mEq, magnesium sulfate 2 g infusion     Admin Date  06/30/2020 Action  Given Dose   Rate  1,000 mL/hr Route  IntraVENous Administered By  Marylen Carrier          0.9% sodium chloride infusion     Admin Date  06/30/2020 Action  New Bag Dose  25 mL/hr Rate  25 mL/hr Route  IntraVENous Administered By  Marylen Carrier          CISplatin (PLATINOL) 62 mg in 0.9% sodium chloride 250 mL, overfill volume 25 mL chemo infusion     Admin Date  06/30/2020 Action  New Bag Dose  62 mg Rate  337 mL/hr Route  IntraVENous Administered By  Marylen Carrier          dexamethasone (DECADRON) 12 mg in 0.9% sodium chloride 50 mL IVPB     Admin Date  06/30/2020 Action  Given Dose  12 mg Route  IntraVENous Administered By  Marylen Carrier          fosaprepitant (EMEND) 150 mg in 0.9% sodium chloride 150 mL IVPB     Admin Date  06/30/2020 Action  Given Dose  150 mg Rate  450 mL/hr Route  IntraVENous Administered By  Marylen Carrier          heparin (porcine) pf 300-500 Units     Admin Date  06/30/2020 Action  Given Dose  500 Units Route  InterCATHeter Administered By  Marylen Carrier          palonosetron HCl (ALOXI) injection 0.25 mg     Admin Date  06/30/2020 Action  Given Dose  0.25 mg Route  IntraVENous Administered By  Marylen Carrier          saline peripheral flush soln 10 mL     Admin Date  06/30/2020 Action  Given Dose  10 mL Route  InterCATHeter Administered By  Marylen Carrier                  Ms. [facility] tolerated treatment well and [facility]. She is to return on 7/7/20 for her next appointment.      Esthela Flor  June 30, 2020

## 2020-07-01 ENCOUNTER — TELEPHONE (OUTPATIENT)
Dept: ONCOLOGY | Age: 50
End: 2020-07-01

## 2020-07-01 ENCOUNTER — PATIENT MESSAGE (OUTPATIENT)
Dept: ONCOLOGY | Age: 50
End: 2020-07-01

## 2020-07-01 ENCOUNTER — HOSPITAL ENCOUNTER (OUTPATIENT)
Dept: INFUSION THERAPY | Age: 50
Discharge: HOME | End: 2020-07-01
Payer: COMMERCIAL

## 2020-07-01 VITALS
SYSTOLIC BLOOD PRESSURE: 130 MMHG | TEMPERATURE: 98.1 F | DIASTOLIC BLOOD PRESSURE: 67 MMHG | RESPIRATION RATE: 15 BRPM | HEART RATE: 71 BPM

## 2020-07-01 DIAGNOSIS — C01 SQUAMOUS CELL CARCINOMA OF BASE OF TONGUE (HCC): Primary | ICD-10-CM

## 2020-07-01 LAB
ANION GAP SERPL CALC-SCNC: 6 MMOL/L (ref 5–15)
BUN SERPL-MCNC: 15 MG/DL (ref 6–20)
BUN/CREAT SERPL: 19 (ref 12–20)
CALCIUM SERPL-MCNC: 8.7 MG/DL (ref 8.5–10.1)
CHLORIDE SERPL-SCNC: 102 MMOL/L (ref 97–108)
CO2 SERPL-SCNC: 25 MMOL/L (ref 21–32)
CREAT SERPL-MCNC: 0.79 MG/DL (ref 0.55–1.02)
GLUCOSE SERPL-MCNC: 138 MG/DL (ref 65–100)
POTASSIUM SERPL-SCNC: 4.3 MMOL/L (ref 3.5–5.1)
SODIUM SERPL-SCNC: 133 MMOL/L (ref 136–145)

## 2020-07-01 PROCEDURE — 80048 BASIC METABOLIC PNL TOTAL CA: CPT

## 2020-07-01 PROCEDURE — 96360 HYDRATION IV INFUSION INIT: CPT

## 2020-07-01 PROCEDURE — 36415 COLL VENOUS BLD VENIPUNCTURE: CPT

## 2020-07-01 PROCEDURE — 74011250636 HC RX REV CODE- 250/636: Performed by: NURSE PRACTITIONER

## 2020-07-01 PROCEDURE — 77030016057 HC NDL HUBR APOL -B

## 2020-07-01 RX ORDER — NUT TX, LACT-REDUCED, IRON 0.09G-2.25
5 LIQUID (ML) ORAL DAILY
Qty: 150 BOTTLE | Refills: 3
Start: 2020-07-01 | End: 2021-05-28 | Stop reason: ALTCHOICE

## 2020-07-01 RX ADMIN — SODIUM CHLORIDE 1000 ML: 900 INJECTION, SOLUTION INTRAVENOUS at 11:39

## 2020-07-01 NOTE — PROGRESS NOTES
[facility] Progress Note    Date: 2020    Name: Glenny Valencia    MRN: [id number]         : 1970    Ms. [facility] Arrived ambulatory and in no distress for Hydration Regimen. Assessment was completed, no acute issues at this time, no new complaints voiced. Right chest wall port accessed without difficulty, labs drawn & sent for processing. Ms. Florecita Gonzalez vitals were reviewed. Patient Vitals for the past 12 hrs:   Temp Pulse Resp BP   20 1139 98.1 °F (36.7 °C) (!) 59 18 115/59         Lab were obtained and pending, please see [facility]. Medications:  1 L NS    Ms. [facility] tolerated treatment well and was discharged from [place] [address] in stable condition. Port de-accessed, flushed & heparinized per protocol. She is to return on 20 for her next appointment.     Domingo Bhakta RN  2020

## 2020-07-01 NOTE — TELEPHONE ENCOUNTER
[facility] [place] at [address], [address] [place] [id number]  W: [phone]  F: [phone]    Medical Nutrition Therapy      Reason for nutrition encounter:    Called and left a message with patient. Patient with progressive dysphagia over the last week, unable to meet nutrition needs. Yesterday was able to consume 2 Ensure, gingerale and water. At this time, anything she eats by mouth is merely to maintain swallow integrity. She is agreeable to a feeding tube and will have it placed next week, unsure the date. Patient with increased energy demands due to treatment course. Will order Boost VHC 5 cans per day. Flush with 60ml before and after giving 1 cans of Boost VHC. This will provide 2650kcal, 110g protein and 1400ml water (47oz)- includes tube feed plus flushes. Results:   Diagnosis: Oropharyngeal cancer   Started treatment on 6/9/20. Chemotherapy Flowsheet 6/30/2020   Cycle C1D22   Date 6/30/2020   Drug / Regimen Cisplatin   Pre Hydration -   Pre Meds -   Notes -       Wt Readings from Last 5 Encounters:   06/30/20 119 lb (54 kg)   06/30/20 119 lb 6.4 oz (54.2 kg)   06/23/20 122 lb 14.4 oz (55.7 kg)   06/23/20 122 lb (55.3 kg)   06/16/20 119 lb 1.6 oz (54 kg)     Estimated Nutrition Needs:   Calorie Range: 2200-2800kcal/day     Protein Range: 60-75g/day     Fluid Needs:  2000ml     Assessment:   Inadequate oral food or beverage intake  (potential) related to concurrent chemotherapy and radiation as evidence by treatment side effects. Plan:    - Start tube feeds - goal 5 cans Boost VHC per day - flush  60ml before and after each can of Boost VHC. - Set up home health [facility]) - will fax info to [phone]  I appreciate the opportunity to participate in Ms. Urmila Dickinson's care.     Signed By: Feroz Kirk, [address] [place] [address], [address] [place] [address] , [address]     Contact: [phone]

## 2020-07-02 ENCOUNTER — OFFICE VISIT (OUTPATIENT)
Dept: PRIMARY CARE CLINIC | Age: 50
End: 2020-07-02

## 2020-07-02 DIAGNOSIS — Z01.818 PRE-OP TESTING: Primary | ICD-10-CM

## 2020-07-02 NOTE — PROGRESS NOTES
Pt seen at [facility] flu clinic. See scanned note. Verbal consent obtained to treat and bill for services. Pt presents for pre op Covid 19 testing. Pt is scheduled to have a PEG tube placed on Tuesday July 7th with a specialist at [facility] due to throat cancer. Pt is asx and denies any known exposure to Covid. Enc pt to continue to try and quarantine until procedure.

## 2020-07-06 ENCOUNTER — HOSPITAL ENCOUNTER (OUTPATIENT)
Dept: RADIATION THERAPY | Age: 50
Discharge: HOME | End: 2020-07-06

## 2020-07-07 ENCOUNTER — HOSPITAL ENCOUNTER (OUTPATIENT)
Age: 50
Setting detail: OUTPATIENT SURGERY
Discharge: HOME | End: 2020-07-07
Attending: INTERNAL MEDICINE | Admitting: INTERNAL MEDICINE
Payer: COMMERCIAL

## 2020-07-07 ENCOUNTER — ANESTHESIA (OUTPATIENT)
Dept: ENDOSCOPY | Age: 50
End: 2020-07-07
Payer: COMMERCIAL

## 2020-07-07 ENCOUNTER — HOSPITAL ENCOUNTER (OUTPATIENT)
Dept: INFUSION THERAPY | Age: 50
End: 2020-07-07
Payer: COMMERCIAL

## 2020-07-07 ENCOUNTER — ANESTHESIA EVENT (OUTPATIENT)
Dept: ENDOSCOPY | Age: 50
End: 2020-07-07
Payer: COMMERCIAL

## 2020-07-07 VITALS
WEIGHT: 115.74 LBS | HEIGHT: 63 IN | SYSTOLIC BLOOD PRESSURE: 130 MMHG | DIASTOLIC BLOOD PRESSURE: 71 MMHG | OXYGEN SATURATION: 100 % | HEART RATE: 67 BPM | TEMPERATURE: 98.1 F | RESPIRATION RATE: 16 BRPM | BODY MASS INDEX: 20.51 KG/M2

## 2020-07-07 LAB — HCG UR QL: NEGATIVE

## 2020-07-07 PROCEDURE — 77030005122 HC CATH GASTMY PEG BSC -B: Performed by: INTERNAL MEDICINE

## 2020-07-07 PROCEDURE — 74011250636 HC RX REV CODE- 250/636: Performed by: NURSE ANESTHETIST, CERTIFIED REGISTERED

## 2020-07-07 PROCEDURE — 76040000019: Performed by: INTERNAL MEDICINE

## 2020-07-07 PROCEDURE — 74011000250 HC RX REV CODE- 250: Performed by: INTERNAL MEDICINE

## 2020-07-07 PROCEDURE — 74011000250 HC RX REV CODE- 250: Performed by: NURSE ANESTHETIST, CERTIFIED REGISTERED

## 2020-07-07 PROCEDURE — 76060000031 HC ANESTHESIA FIRST 0.5 HR: Performed by: INTERNAL MEDICINE

## 2020-07-07 PROCEDURE — 81025 URINE PREGNANCY TEST: CPT

## 2020-07-07 PROCEDURE — 74011250636 HC RX REV CODE- 250/636: Performed by: INTERNAL MEDICINE

## 2020-07-07 RX ORDER — DEXTROMETHORPHAN/PSEUDOEPHED 2.5-7.5/.8
1.2 DROPS ORAL
Status: DISCONTINUED | OUTPATIENT
Start: 2020-07-07 | End: 2020-07-07 | Stop reason: HOSPADM

## 2020-07-07 RX ORDER — NALOXONE HYDROCHLORIDE 0.4 MG/ML
0.4 INJECTION, SOLUTION INTRAMUSCULAR; INTRAVENOUS; SUBCUTANEOUS
Status: DISCONTINUED | OUTPATIENT
Start: 2020-07-07 | End: 2020-07-07 | Stop reason: HOSPADM

## 2020-07-07 RX ORDER — SODIUM CHLORIDE 9 MG/ML
INJECTION, SOLUTION INTRAVENOUS
Status: DISCONTINUED | OUTPATIENT
Start: 2020-07-07 | End: 2020-07-07 | Stop reason: HOSPADM

## 2020-07-07 RX ORDER — ATROPINE SULFATE 0.1 MG/ML
0.4 INJECTION INTRAVENOUS
Status: DISCONTINUED | OUTPATIENT
Start: 2020-07-07 | End: 2020-07-07 | Stop reason: HOSPADM

## 2020-07-07 RX ORDER — PROPOFOL 10 MG/ML
INJECTION, EMULSION INTRAVENOUS AS NEEDED
Status: DISCONTINUED | OUTPATIENT
Start: 2020-07-07 | End: 2020-07-07 | Stop reason: HOSPADM

## 2020-07-07 RX ORDER — GLYCOPYRROLATE 0.2 MG/ML
INJECTION INTRAMUSCULAR; INTRAVENOUS AS NEEDED
Status: DISCONTINUED | OUTPATIENT
Start: 2020-07-07 | End: 2020-07-07 | Stop reason: HOSPADM

## 2020-07-07 RX ORDER — MIDAZOLAM HYDROCHLORIDE 1 MG/ML
.25-5 INJECTION, SOLUTION INTRAMUSCULAR; INTRAVENOUS
Status: DISCONTINUED | OUTPATIENT
Start: 2020-07-07 | End: 2020-07-07 | Stop reason: HOSPADM

## 2020-07-07 RX ORDER — FLUMAZENIL 0.1 MG/ML
0.2 INJECTION INTRAVENOUS
Status: DISCONTINUED | OUTPATIENT
Start: 2020-07-07 | End: 2020-07-07 | Stop reason: HOSPADM

## 2020-07-07 RX ORDER — SODIUM CHLORIDE 9 MG/ML
50 INJECTION, SOLUTION INTRAVENOUS CONTINUOUS
Status: DISCONTINUED | OUTPATIENT
Start: 2020-07-07 | End: 2020-07-07 | Stop reason: HOSPADM

## 2020-07-07 RX ORDER — ONDANSETRON 2 MG/ML
INJECTION INTRAMUSCULAR; INTRAVENOUS AS NEEDED
Status: DISCONTINUED | OUTPATIENT
Start: 2020-07-07 | End: 2020-07-07 | Stop reason: HOSPADM

## 2020-07-07 RX ORDER — EPINEPHRINE 0.1 MG/ML
1 INJECTION INTRACARDIAC; INTRAVENOUS
Status: DISCONTINUED | OUTPATIENT
Start: 2020-07-07 | End: 2020-07-07 | Stop reason: HOSPADM

## 2020-07-07 RX ORDER — LIDOCAINE HYDROCHLORIDE 20 MG/ML
INJECTION, SOLUTION EPIDURAL; INFILTRATION; INTRACAUDAL; PERINEURAL AS NEEDED
Status: DISCONTINUED | OUTPATIENT
Start: 2020-07-07 | End: 2020-07-07 | Stop reason: HOSPADM

## 2020-07-07 RX ADMIN — SODIUM CHLORIDE: 9 INJECTION, SOLUTION INTRAVENOUS at 13:58

## 2020-07-07 RX ADMIN — LIDOCAINE HYDROCHLORIDE 100 MG: 20 INJECTION, SOLUTION INTRAVENOUS at 14:15

## 2020-07-07 RX ADMIN — ONDANSETRON HYDROCHLORIDE 4 MG: 2 SOLUTION INTRAMUSCULAR; INTRAVENOUS at 14:15

## 2020-07-07 RX ADMIN — GLYCOPYRROLATE 0.2 MG: 0.2 INJECTION, SOLUTION INTRAMUSCULAR; INTRAVENOUS at 14:15

## 2020-07-07 RX ADMIN — PROPOFOL 100 MG: 10 INJECTION, EMULSION INTRAVENOUS at 14:19

## 2020-07-07 RX ADMIN — WATER 2 G: 1 INJECTION INTRAMUSCULAR; INTRAVENOUS; SUBCUTANEOUS at 13:38

## 2020-07-07 RX ADMIN — SODIUM CHLORIDE 50 ML/HR: 900 INJECTION, SOLUTION INTRAVENOUS at 13:39

## 2020-07-07 NOTE — PROCEDURES
Jason Montes M.D.  [phone]     2020               EGD and PEG placement Operative Report  Dulce Fillers  :  1970  Juan Stark Medical Record Number:  [id number]        Procedure Type:   EGD and PEG tube placement    Indications:  Dysphagia secondary to oropharyngeal cancer ongoing chemoradiation     Pre-operative Diagnosis: see indication above    Post-operative Diagnosis:  See findings below    :  Babatunde Lowry MD    Referring Provider: Cam Coffey NP      Sedation:  MAC anesthesia    Pre-Procedural Exam:      Airway: clear,  No airway problems anticipated  Heart: RRR, without gallops or rubs  Lungs: clear bilaterally without wheezes, crackles, or rhonchi  Abdomen: soft, nontender, nondistended, bowel sounds present  Mental Status: awake, alert and oriented to person, place and time       Prior to the procedure its objectives, risks, consequences and alternatives were discussed with the patient who then elected to proceed. The patient had the opportunity to ask questions and those questions were answered. A physical exam was performed. The heart, lungs, and mental status were examined prior to the procedure and found to be satisfactory for conscious sedation and for the procedure. Conscious sedation was initiated by the physician. Continuous pulse oximetry and blood pressure monitoring were used throughout the procedure. After appropriate pharyngeal anesthesia, the endoscope was passed into the esophagus without difficulty. The proximal esophagus is normal as is the distal esophagus. The fundus, body, antrum, pylorus, bulb and postbulbar area are unremarkable. On slow withdrawal of the scope, following the usual fashion of transillumination and finger indentation into the abdominal wall, an optimal site at the skin was identified. The skin was then prepped with betadine.  Under sterile conditions, 1% Xylocaine was injected at the chosen site and across the abdominal wall. The needle was easily seen passing into the gastric lumen. At this point, a small incision was made at the skin site, followed by passage of a trocar through the incision. The trocar passed into the gastric lumen and served to pass a guidewire under direct vision into the stomach. The wire was snared and brought along with the scope out from the mouth. The PEG tube was passed over the wire and brought into the stomach and out of the abdominal wall without difficulty. The scope was then reinserted and the positioning of the PEG tube was excellent. The incision site was then cleaned and betadine ointment applied. The patient tolerated the procedure without complication and will return to the room in satisfactory condition. Specimen Removed:  none    Complications: None. Impression:    Successful placement of 22 Fr feeding tube. Normal endoscopy within normal    Recommendations:   - Can use PEG for medications and water today  - Evaluate PEG site tomorrow at the office and then can start tube feedings as needed  - Continue to take nutrition as tolerated by mouth    Discharge Disposition:  Home in the company of a  when able to ambulate.     Signed By: Sepncer Knight MD     7/7/2020  2:39 PM

## 2020-07-07 NOTE — PERIOP NOTES
Received Report From Luisito Brady CRNA @ 1237, see anesthesia notes. Care of the patient transferred to procedure nurse Erendira Boyer, RN @ [phone]    Out of Procedure and sent to post-recovery @ 0218    Post-recovery report given to Norris Rothman RN @ 5477    Patient ABD remains soft and non-tender post procedure. Pt has no complaints at this time and tolerated the procedure well. Endoscope was pre-cleaned at bedside immediately following procedure by [facility].

## 2020-07-07 NOTE — ROUTINE PROCESS
Lindy Trinh 1970 
[id number] Situation: 
Verbal report received from: Dona Martínez RN Procedure: Procedure(s): ESOPHAGOGASTRODUODENOSCOPY (EGD) PERCUTANEOUS ENDOSCOPIC GASTROSTOMY TUBE INSERTION Background: 
 
Preoperative diagnosis: dysphagia Postoperative diagnosis: * No post-op diagnosis entered * :  Dr. Antony Garces Assistant(s): Endoscopy RN-1: Waleska Tamayo RN Specimens: * No specimens in log * H. Pylori  no Assessment: 
Intra-procedure medications Anesthesia gave intra-procedure sedation and medications, see anesthesia flow sheet yes Intravenous fluids: NS@ Rajat Messenger Vital signs stable Abdominal assessment: round and soft Recommendation: 
Discharge patient per MD order. Family or Friend Permission to share finding with family or friend yes Endoscopy discharge instructions have been reviewed and given to patient and spouse. The patient and spouse verbalized understanding and acceptance of instructions. Dr. Atnony Garces discussed with patient and spouse procedure findings and next steps.

## 2020-07-07 NOTE — H&P
Sebastien Walls M.D.  [phone]            History and Physical       NAME:  Primitivo Guallpa   :   1970   MRN:   [id number]       Referring Physician:  Velma Littlejohn NP      Consult Date: 2020 8:12 AM    Chief Complaint:  Head and neck cancer    History of Present Illness:  Patient is a 52 y.o. who is seen for peg tube palcement. Denies any ongoing GI complaints. PMH:  Past Medical History:   Diagnosis Date    Cancer ([address]     rt side neck lymph node    Fainting spell        PSH:  Past Surgical History:   Procedure Laterality Date    ABDOMEN SURGERY PROC UNLISTED      HX  SECTION      IR INSERT TUNL CVC W PORT OVER 5 YEARS  2020       Allergies: Allergies   Allergen Reactions    Tylenol-Codeine #3 [Acetaminophen-Codeine] Nausea Only       Home Medications:  Prior to Admission Medications   Prescriptions Last Dose Informant Patient Reported? Taking?   aluminum-magnesium hydroxide 200-200 mg/5 mL susp 5 mL, diphenhydrAMINE 12.5 mg/5 mL liqd 5 mL, lidocaine 2 % soln 5 mL   Yes No   Si mL by Swish and Spit route two (2) times a day. Magic mouth wash   Maalox  Lidocaine 2% viscous   Diphenhydramine oral solution     Pharmacy to mix equal portions of ingredients to a total volume as indicated in the dispense amount. citalopram (CELEXA) 20 mg tablet   Yes No   Sig: Take  by mouth daily. dexAMETHasone (DECADRON) 4 mg tablet   No No   Sig: Take 8mg (two tabs) on days 2 and 3 after each chemotherapy cycle. fluconazole (DIFLUCAN) 10 mg/mL suspension   No No   Sig: Take 200mg PO on day 1 then 100mg PO on days 2-14   lidocaine-prilocaine (EMLA) topical cream   No No   Sig: Apply  to affected area as needed for Pain (Apply 30-60 min. prior to having your port accessed). nut tx, lact-reduced, iron (Boost VHC) 0.09-2.25 gram-kcal/mL liqd   No No   Sig: Take 5 Cans by mouth daily.    ondansetron hcl (ZOFRAN) 8 mg tablet   No No   Sig: Take 1 Tab by mouth every eight (8) hours as needed for Nausea. prochlorperazine (Compazine) 10 mg tablet   No No   Sig: Take 1 Tab by mouth every six (6) hours as needed for Nausea. promethazine (PHENERGAN) 25 mg tablet   No No   Sig: Take 1 Tab by mouth every six (6) hours as needed for Nausea. Facility-Administered Medications: None       Hospital Medications:  No current facility-administered medications for this encounter. Social History:  Social History     Tobacco Use    Smoking status: Current Every Day Smoker     Packs/day: 1.00     Years: 23.00     Pack years: 23.00     Types: Cigarettes    Smokeless tobacco: Never Used   Substance Use Topics    Alcohol use: Yes     Alcohol/week: 1.0 standard drinks     Types: 1 Cans of beer per week     Comment: occassionally       Family History:  Family History   Problem Relation Age of Onset    Diabetes Mother     Heart Disease Mother     Cancer Father         bone    Hypertension Father              Review of Systems:      Constitutional: negative fever, negative chills, negative weight loss  Eyes:   negative visual changes  ENT:   negative sore throat, tongue or lip swelling  Respiratory:  negative cough, negative dyspnea  Cards:  negative for chest pain, palpitations, lower extremity edema  GI:   See HPI  :  negative for frequency, dysuria  Integument:  negative for rash and pruritus  Heme:  negative for easy bruising and gum/nose bleeding  Musculoskel: negative for myalgias,  back pain and muscle weakness  Neuro: negative for headaches, dizziness, vertigo  Psych:  negative for feelings of anxiety, depression       Objective:   No data found. No intake/output data recorded. No intake/output data recorded.     EXAM:     NEURO-a&o   HEENT-wnl   LUNGS-clear    COR-regular rate and rhythym     ABD-soft , no tenderness, no rebound, good bs     EXT-no edema     Data Review     No results for input(s): WBC, HGB, HCT, PLT, HGBEXT, HCTEXT, PLTEXT in the last 72 hours. No results for input(s): NA, K, CL, CO2, BUN, CREA, GLU, PHOS, CA in the last 72 hours. No results for input(s): AP, TBIL, TP, ALB, GLOB, GGT, AML, LPSE in the last 72 hours. No lab exists for component: SGOT, GPT, AMYP, HLPSE  No results for input(s): INR, PTP, APTT, INREXT in the last 72 hours. 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      Signed By: John Chaparro MD     7/7/2020  8:12 AM

## 2020-07-07 NOTE — PERIOP NOTES
Educated patient on how to put medications through PEG Tube. Patient understands which ports are for medications and which one is for feeding.

## 2020-07-07 NOTE — ANESTHESIA PREPROCEDURE EVALUATION
Relevant Problems   No relevant active problems       Anesthetic History               Review of Systems / Medical History  Patient summary reviewed and pertinent labs reviewed    Pulmonary          Smoker         Neuro/Psych         Psychiatric history     Cardiovascular  Within defined limits                Exercise tolerance: >4 METS     GI/Hepatic/Renal  Within defined limits              Endo/Other        Cancer (head/neck cancer )     Other Findings              Physical Exam    Airway  Mallampati: II  TM Distance: 4 - 6 cm  Neck ROM: normal range of motion   Mouth opening: Normal     Cardiovascular    Rhythm: regular  Rate: normal         Dental      Comments: Chipped front teeth   Pulmonary  Breath sounds clear to auscultation               Abdominal         Other Findings            Anesthetic Plan    ASA: 3  Anesthesia type: MAC          Induction: Intravenous  Anesthetic plan and risks discussed with: Patient

## 2020-07-07 NOTE — DISCHARGE INSTRUCTIONS
Patricia Malave M.D.  [phone]           2020  Chino Sheets  :  1970  [employer] Medical Record Number:  [id number]        ENDOSCOPY FINDINGS:   Your endoscopy showed normal mucosa throughout, feeding tube placed without difficulty. EGD DISCHARGE INSTRUCTIONS    DISCOMFORT:  Sore throat- throat lozenges or warm salt water gargle  redness at IV site- apply warm compress to area; if redness or soreness persist- contact your physician  Gaseous discomfort- walking, belching will help relieve any discomfort  You may not operate a vehicle for 12 hours  You may not engage in an occupation involving machinery or appliances for rest of today  You may not drink alcoholic beverages for at least 12 hours  Avoid making any critical decisions for at least 24 hour    DIET:   You may resume your soft diet. ACTIVITY  Spend the remainder of the day resting -  avoid any strenuous activity. Avoid driving or operating machinery. CALL M.D. ANY SIGN OF   Increasing pain, nausea, vomiting  Abdominal distension (swelling)  New increased bleeding (oral or rectal)  Fever (chills)  Pain in chest area  Bloody discharge from nose or mouth  Shortness of breath    Follow-up Instructions:   Call Dr. Juan Dia for any questions or problems. Telephone # [phone]  You can start using the tube today for medications and water only. Follow-up in the office tomorrow to check on the incision site and then you can start using it for tube feedings. Continue same medications.

## 2020-07-08 ENCOUNTER — OFFICE VISIT (OUTPATIENT)
Dept: ONCOLOGY | Age: 50
End: 2020-07-08

## 2020-07-08 ENCOUNTER — HOSPITAL ENCOUNTER (OUTPATIENT)
Dept: INFUSION THERAPY | Age: 50
Discharge: HOME | End: 2020-07-08
Payer: COMMERCIAL

## 2020-07-08 ENCOUNTER — TELEPHONE (OUTPATIENT)
Dept: ONCOLOGY | Age: 50
End: 2020-07-08

## 2020-07-08 VITALS
TEMPERATURE: 98 F | SYSTOLIC BLOOD PRESSURE: 114 MMHG | RESPIRATION RATE: 20 BRPM | HEART RATE: 74 BPM | OXYGEN SATURATION: 96 % | DIASTOLIC BLOOD PRESSURE: 65 MMHG

## 2020-07-08 VITALS
DIASTOLIC BLOOD PRESSURE: 65 MMHG | HEART RATE: 74 BPM | RESPIRATION RATE: 20 BRPM | SYSTOLIC BLOOD PRESSURE: 114 MMHG | TEMPERATURE: 98 F | OXYGEN SATURATION: 96 %

## 2020-07-08 DIAGNOSIS — C01 SQUAMOUS CELL CARCINOMA OF BASE OF TONGUE (HCC): Primary | ICD-10-CM

## 2020-07-08 LAB
ALBUMIN SERPL-MCNC: 3.1 G/DL (ref 3.5–5)
ALBUMIN/GLOB SERPL: 0.8 {RATIO} (ref 1.1–2.2)
ALP SERPL-CCNC: 75 U/L (ref 45–117)
ALT SERPL-CCNC: 21 U/L (ref 12–78)
ANION GAP SERPL CALC-SCNC: 6 MMOL/L (ref 5–15)
AST SERPL-CCNC: 16 U/L (ref 15–37)
BASOPHILS # BLD: 0 K/UL (ref 0–0.1)
BASOPHILS NFR BLD: 0 % (ref 0–1)
BILIRUB SERPL-MCNC: 0.4 MG/DL (ref 0.2–1)
BUN SERPL-MCNC: 12 MG/DL (ref 6–20)
BUN/CREAT SERPL: 19 (ref 12–20)
CALCIUM SERPL-MCNC: 8.5 MG/DL (ref 8.5–10.1)
CHLORIDE SERPL-SCNC: 100 MMOL/L (ref 97–108)
CO2 SERPL-SCNC: 26 MMOL/L (ref 21–32)
CREAT SERPL-MCNC: 0.62 MG/DL (ref 0.55–1.02)
DIFFERENTIAL METHOD BLD: ABNORMAL
EOSINOPHIL # BLD: 0 K/UL (ref 0–0.4)
EOSINOPHIL NFR BLD: 0 % (ref 0–7)
ERYTHROCYTE [DISTWIDTH] IN BLOOD BY AUTOMATED COUNT: 13.4 % (ref 11.5–14.5)
GLOBULIN SER CALC-MCNC: 3.7 G/DL (ref 2–4)
GLUCOSE SERPL-MCNC: 150 MG/DL (ref 65–100)
HCT VFR BLD AUTO: 33.3 % (ref 35–47)
HGB BLD-MCNC: 11.2 G/DL (ref 11.5–16)
IMM GRANULOCYTES # BLD AUTO: 0 K/UL (ref 0–0.04)
IMM GRANULOCYTES NFR BLD AUTO: 0 % (ref 0–0.5)
LYMPHOCYTES # BLD: 0.3 K/UL (ref 0.8–3.5)
LYMPHOCYTES NFR BLD: 5 % (ref 12–49)
MAGNESIUM SERPL-MCNC: 1.9 MG/DL (ref 1.6–2.4)
MCH RBC QN AUTO: 30.3 PG (ref 26–34)
MCHC RBC AUTO-ENTMCNC: 33.6 G/DL (ref 30–36.5)
MCV RBC AUTO: 90 FL (ref 80–99)
MONOCYTES # BLD: 0.2 K/UL (ref 0–1)
MONOCYTES NFR BLD: 3 % (ref 5–13)
NEUTS SEG # BLD: 4.5 K/UL (ref 1.8–8)
NEUTS SEG NFR BLD: 92 % (ref 32–75)
NRBC # BLD: 0 K/UL (ref 0–0.01)
NRBC BLD-RTO: 0 PER 100 WBC
PLATELET # BLD AUTO: 75 K/UL (ref 150–400)
PMV BLD AUTO: 9.7 FL (ref 8.9–12.9)
POTASSIUM SERPL-SCNC: 3.7 MMOL/L (ref 3.5–5.1)
PROT SERPL-MCNC: 6.8 G/DL (ref 6.4–8.2)
RBC # BLD AUTO: 3.7 M/UL (ref 3.8–5.2)
RBC MORPH BLD: ABNORMAL
SARS-COV-2, NAA: NOT DETECTED
SODIUM SERPL-SCNC: 132 MMOL/L (ref 136–145)
WBC # BLD AUTO: 5 K/UL (ref 3.6–11)
WBC MORPH BLD: ABNORMAL

## 2020-07-08 PROCEDURE — 85025 COMPLETE CBC W/AUTO DIFF WBC: CPT

## 2020-07-08 PROCEDURE — 77030016057 HC NDL HUBR APOL -B

## 2020-07-08 PROCEDURE — 80053 COMPREHEN METABOLIC PANEL: CPT

## 2020-07-08 PROCEDURE — 83735 ASSAY OF MAGNESIUM: CPT

## 2020-07-08 PROCEDURE — 36591 DRAW BLOOD OFF VENOUS DEVICE: CPT

## 2020-07-08 RX ORDER — POLYETHYLENE GLYCOL 3350 17 G/17G
17 POWDER, FOR SOLUTION ORAL DAILY
Qty: 14 EACH | Refills: 1 | Status: SHIPPED | OUTPATIENT
Start: 2020-07-08 | End: 2020-11-24

## 2020-07-08 RX ORDER — OXYCODONE HCL 20 MG/ML
5 CONCENTRATE, ORAL ORAL
Qty: 15 ML | Refills: 0 | Status: SHIPPED | OUTPATIENT
Start: 2020-07-08 | End: 2020-07-14 | Stop reason: DRUGHIGH

## 2020-07-08 NOTE — PROGRESS NOTES
[facility] Progress Note    Date: 2020    Name: Daxa Mcduffie    MRN: [id number]         : 1970    Ms. Allie Field Arrived ambulatory and in no distress for pre treatment labs Regimen. Assessment was completed, no acute issues at this time, no new complaints voiced. Right chest wall port accessed without difficulty, labs drawn & sent for processing. Ms. Kizzy Thompson vitals were reviewed. Visit Vitals  /65   Pulse 74   Temp 98 °F (36.7 °C)   Resp 20   SpO2 96%       Lab were obtained and pending, [facility] Ms. Su Santos discharged from [place] [address] in stable condition. Port de-accessed, flushed & heparinized per protocol. She is to return on 20 for her next appointment.     Leon Duggan RN  2020

## 2020-07-08 NOTE — TELEPHONE ENCOUNTER
[address] at [place]  [phone]    07/08/20- Detailed message left for patient informing her we don't want to skip treatment since it's weekly. Requested a return call as soon as she's available. 10:49 AM- Spoke to patient's , he stated patient's having lower left back pain, in her kidney area. They also tried doing a tube feed this morning, they were able to get water in and some formula, but then it stopped. Encouraged patient to come in for further evaluation and fluids if needed. She was agreeable. hospitals notified, stated they could add her on for chemo tomorrow at 7:30 am.  Will discuss with patient when she comes in today. Valeria Linares notified.

## 2020-07-08 NOTE — PROGRESS NOTES
[facility] [place] at [address] [facility] [facility]  Valerie Stallworthler: [phone]  F: [phone]      Reason for Visit:   Dulce Fillers is a 52 y.o. female who is seen for follow up of oropharyngeal cancer. Treatment History:   · Biopsy of neck mass 3/25/2020: squamous cell carcinoma  · PET/CT 4/8/4275: Hypermetabolic right level 2 cervical lymph node likely corresponds to the biopsy proven squamous cell carcinoma. Focal increased tracer activity at the right tongue base/right vallecula is suspicious for primary malignancy. No evidence of distant metastatic disease. · Direct laryngoscopy by Dr. Gann Sees 4/13/2020: Merline Muck tissue in right lateral tongue base without well-defined mass. Biopsies of tongue base with invasive squamous cell carcinoma, moderately differentiated, p16+. 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 Jona Chen on 6/8/2020  · Initiated curative intent therapy with weekly Cisplatin on 6/9/2020    History of Present Illness:   Had PEG tube placement done yesterday. Last evening developed significant pain in left upper flank area, states pain is severe. She was unable to sleep due to pain. Pain is worse with laying on area/pressure on area. She does not have pain medication on hand at home. Completed first PEG tube feeding this morning with no change to pain. No nausea or vomiting currently. Last BM 2 days ago. Little intake by mouth. Drinking some water. Having pain with swallowing. Denies abdominal pain. No fever or chills. No cough. She is a smoker, working on cutting back. She is unaccompanied today. 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 She lives in [place] with her .     PAST HISTORY: The following sections were reviewed and updated in the EMR as appropriate: PMH, SH, FH, Medications, Allergies. Allergies   Allergen Reactions    Tylenol-Codeine #3 [Acetaminophen-Codeine] Nausea Only      Review of Systems: A complete review of systems was obtained, reviewed. Pertinent findings reviewed above. Physical Exam:     Visit Vitals  /65 (BP 1 Location: Left arm, BP Patient Position: Sitting) Comment: . Pulse 74   Temp 98 °F (36.7 °C) (Temporal)   Resp 20   SpO2 96%     ECOG PS: 0  General: No distress  Respiratory: Normal respiratory effort  CV: No peripheral edema  MS: Left flank to left lateral ribs tender to touch  Abd: PEG tube site looks good, flushes with ease, no pain or tenderness to site. Skin: No rashes, ecchymoses, or petechiae  Psych: Alert, oriented, normal mood/affect    Results:     Lab Results   Component Value Date/Time    WBC 5.0 07/08/2020 01:27 PM    HGB 11.2 (L) 07/08/2020 01:27 PM    HCT 33.3 (L) 07/08/2020 01:27 PM    PLATELET 75 (L) 95/91/7548 01:27 PM    MCV 90.0 07/08/2020 01:27 PM    ABS. NEUTROPHILS 4.5 07/08/2020 01:27 PM     Lab Results   Component Value Date/Time    Sodium 132 (L) 07/08/2020 01:27 PM    Potassium 3.7 07/08/2020 01:27 PM    Chloride 100 07/08/2020 01:27 PM    CO2 26 07/08/2020 01:27 PM    Glucose 150 (H) 07/08/2020 01:27 PM    BUN 12 07/08/2020 01:27 PM    Creatinine 0.62 07/08/2020 01:27 PM    GFR est AA >60 07/08/2020 01:27 PM    GFR est non-AA >60 07/08/2020 01:27 PM    Calcium 8.5 07/08/2020 01:27 PM     Lab Results   Component Value Date/Time    Bilirubin, total 0.4 07/08/2020 01:27 PM    ALT (SGPT) 21 07/08/2020 01:27 PM    Alk. phosphatase 75 07/08/2020 01:27 PM    Protein, total 6.8 07/08/2020 01:27 PM    Albumin 3.1 (L) 07/08/2020 01:27 PM    Globulin 3.7 07/08/2020 01:27 PM       Assessment:   1) Squamous cell carcinoma of the right base of tongue  Stage I, p16+  She has biopsy proven disease in her base of tongue and right cervical node. Images personally reviewed with radiologist, node is <2cm.   She has Stage I disease based on the assumption that her cancer is largely driven by HPV, which portends a more favorable prognosis. However, she is also a smoker and this could be driving her disease as well. In either case, recommendation is for definitive radiation with concurrent chemotherapy. She is currently receiving radiation with Dr. Yamilet Rivas, and we are treating her with weekly Cisplatin 40mg/m2. Tolerating therapy well with exception of grade 1 nausea, grade 1 dysguesia and loss of appetite. Added Dexamethasone home dosing with week 2 of therapy. She was due for therapy with week 5 on 7/7/2020, however, this was delayed due to PEG tube insertion scheduled. Treatment rescheduled until today, however, patient canceled this appointment due to new onset pain this morning. She was brought in for add-on. Will plan to obtain labs today and resume therapy tomorrow at 0730. Explained need to keep treatment on schedule due to curative intent therapy. She will be seen weekly on therapy. 2) Dysphagia  Minimal on swallow study. Seen by speech pathologist.  Susanna Gilford will follow. Has had PEG tube placed since last cycle of therapy. Will ask dietician to reach out regarding PEG tube feeding needs/schedule. Given 120ml flush and 200ml Ensure via PEG in office today without issue. 3) CINV  Continue antiemetics, added dexamethasone on days 2 and 3 of each cycle with C2.    4) Tobacco abuse  I counseled her on the importance of cessation. She is working on cutting back. 5) Left flank pain  New and severe, uncertain cause. Pain after PEG tube placement yesterday. PEG tube is functioning very well, no pain to site. Will obtain labs today to ensure kidney function is adequate. Provide Oxycodone PRN for left flank and throat pain. If pain not relieved this afternoon/evening recommend ED for urgent evaluation and imaging. 6) Emotional Well Being  No psychosocial concerns identified today.  Patient has adequate support. Plan:     · Proceed 7/9/2020 with C5 of Cisplatin (40mg/m2), given every week concurrently with radiation. · Proceed with radiation therapy with Dr. Mike Portillo tomorrow, missed radiation today  · Labs before each treatment: CBC, BMP, Mag  · Antiemetic prophylaxis: Ondansetron, Dexamethasone, Fosaprepitant prior to each chemotherapy. Dexamethasone for three days after therapy at home.   · PRN antiemetics at home: Ondansetron, Prochlorperazine, Lorazepam  · Encouraged patient to push fluids (2L+ per day)  · Weekly labs during therapy, with IVFs 2x/week  · Oxycodone PRN  · Return to see me weekly on therapy      Signed By: Lisa Lopez MD

## 2020-07-08 NOTE — PROGRESS NOTES
Daxa Mcduffie is a 52 y.o. female follow up for oropharyngeal cancer. 1. Have you been to the ER, urgent care clinic since your last visit? Hospitalized since your last visit?no     2. Have you seen or consulted any other health care providers outside of the [address] [place] [address] since your last visit? Include any pap smears or colon screening.  No

## 2020-07-08 NOTE — TELEPHONE ENCOUNTER
Pt canceled her appts for today -she states she had a feeding tube placement yesterday and she isn't feeling well to come in today

## 2020-07-09 ENCOUNTER — TELEPHONE (OUTPATIENT)
Dept: ONCOLOGY | Age: 50
End: 2020-07-09

## 2020-07-09 ENCOUNTER — HOSPITAL ENCOUNTER (OUTPATIENT)
Dept: INFUSION THERAPY | Age: 50
Discharge: HOME | End: 2020-07-09
Payer: COMMERCIAL

## 2020-07-09 ENCOUNTER — HOSPITAL ENCOUNTER (OUTPATIENT)
Dept: GENERAL RADIOLOGY | Age: 50
Discharge: HOME | End: 2020-07-09
Payer: COMMERCIAL

## 2020-07-09 VITALS
OXYGEN SATURATION: 96 % | WEIGHT: 116.3 LBS | TEMPERATURE: 98.5 F | BODY MASS INDEX: 20.61 KG/M2 | HEART RATE: 75 BPM | HEIGHT: 63 IN | DIASTOLIC BLOOD PRESSURE: 58 MMHG | RESPIRATION RATE: 16 BRPM | SYSTOLIC BLOOD PRESSURE: 122 MMHG

## 2020-07-09 DIAGNOSIS — C01 SQUAMOUS CELL CARCINOMA OF BASE OF TONGUE (HCC): Primary | ICD-10-CM

## 2020-07-09 DIAGNOSIS — R10.9 LEFT LATERAL ABDOMINAL PAIN: ICD-10-CM

## 2020-07-09 DIAGNOSIS — C01 SQUAMOUS CELL CARCINOMA OF BASE OF TONGUE (HCC): ICD-10-CM

## 2020-07-09 PROCEDURE — 74022 RADEX COMPL AQT ABD SERIES: CPT

## 2020-07-09 PROCEDURE — 77030012965 HC NDL HUBR BBMI -A

## 2020-07-09 PROCEDURE — 96367 TX/PROPH/DG ADDL SEQ IV INF: CPT

## 2020-07-09 PROCEDURE — 74011250636 HC RX REV CODE- 250/636: Performed by: INTERNAL MEDICINE

## 2020-07-09 PROCEDURE — 96413 CHEMO IV INFUSION 1 HR: CPT

## 2020-07-09 PROCEDURE — 96375 TX/PRO/DX INJ NEW DRUG ADDON: CPT

## 2020-07-09 PROCEDURE — 74011000250 HC RX REV CODE- 250: Performed by: INTERNAL MEDICINE

## 2020-07-09 PROCEDURE — 74011000258 HC RX REV CODE- 258: Performed by: INTERNAL MEDICINE

## 2020-07-09 RX ORDER — HEPARIN 100 UNIT/ML
300-500 SYRINGE INTRAVENOUS AS NEEDED
Status: ACTIVE | OUTPATIENT
Start: 2020-07-09 | End: 2020-07-09

## 2020-07-09 RX ORDER — SODIUM CHLORIDE 0.9 % (FLUSH) 0.9 %
10 SYRINGE (ML) INJECTION AS NEEDED
Status: DISPENSED | OUTPATIENT
Start: 2020-07-09 | End: 2020-07-09

## 2020-07-09 RX ORDER — SODIUM CHLORIDE 9 MG/ML
10 INJECTION INTRAMUSCULAR; INTRAVENOUS; SUBCUTANEOUS AS NEEDED
Status: ACTIVE | OUTPATIENT
Start: 2020-07-09 | End: 2020-07-09

## 2020-07-09 RX ORDER — PALONOSETRON 0.05 MG/ML
0.25 INJECTION, SOLUTION INTRAVENOUS ONCE
Status: COMPLETED | OUTPATIENT
Start: 2020-07-09 | End: 2020-07-09

## 2020-07-09 RX ORDER — SODIUM CHLORIDE 9 MG/ML
25 INJECTION, SOLUTION INTRAVENOUS CONTINUOUS
Status: DISPENSED | OUTPATIENT
Start: 2020-07-09 | End: 2020-07-09

## 2020-07-09 RX ADMIN — SODIUM CHLORIDE 25 ML/HR: 900 INJECTION, SOLUTION INTRAVENOUS at 08:17

## 2020-07-09 RX ADMIN — CISPLATIN 62 MG: 1 INJECTION INTRAVENOUS at 09:57

## 2020-07-09 RX ADMIN — POTASSIUM CHLORIDE: 2 INJECTION, SOLUTION, CONCENTRATE INTRAVENOUS at 08:47

## 2020-07-09 RX ADMIN — FOSAPREPITANT 150 MG: 150 INJECTION, POWDER, LYOPHILIZED, FOR SOLUTION INTRAVENOUS at 08:22

## 2020-07-09 RX ADMIN — Medication 500 UNITS: at 11:01

## 2020-07-09 RX ADMIN — Medication 10 ML: at 11:01

## 2020-07-09 RX ADMIN — PALONOSETRON HYDROCHLORIDE 0.25 MG: 0.25 INJECTION INTRAVENOUS at 08:18

## 2020-07-09 RX ADMIN — DEXAMETHASONE SODIUM PHOSPHATE 12 MG: 10 INJECTION, SOLUTION INTRAMUSCULAR; INTRAVENOUS at 08:24

## 2020-07-09 RX ADMIN — SODIUM CHLORIDE 10 ML: 9 INJECTION, SOLUTION INTRAMUSCULAR; INTRAVENOUS; SUBCUTANEOUS at 07:49

## 2020-07-09 NOTE — PROGRESS NOTES
Please let her know xray appears stable with PEG tube in good position. There is some fluid around left lung, this should not be causing pain. If pain persists, we will obtain CT scan to evaluate further. For now, will continue Oxycodone PRN. Continue tube feedings. Call if no improvement in symptoms.

## 2020-07-09 NOTE — PROGRESS NOTES
[facility] VISIT NOTE    [id number]. Pt arrived at [facility] ambulatory and in no distress for C1D29 Cisplatin. Assessment completed, pt c/o continued left flank abdominal pain. Pt states pain medication given has not helped, only makes her sleepy. MD office notified. Per MD office, pt can double dose of pain medication however if pain is not improving or worsening to notify MD office for possible ultrasound/x-ray. Pt notified and verbalized understanding. RCW chest port accessed with . 75 in colon with no difficulty. Positive blood return noted and labs drawn on 7/8. Verified with MD office ok to proceed with treatment today concerning platelets of 75. Nursing misc order placed.    1000. Pt wanting x-ray for abdomen. Pt states she \"can't go on like this\". MD office notified and xray ordered. Pt instructed to go to [facility] for xray after treatment. Pt verbalized understanding. Medications received:  NS KVO  Aloxi IV  Dexamethasone IV  Emend IV  1 Liter NS with Potassium Chloride and Mag Sulfate  Cisplatin IV    Tolerated treatment well, no adverse reaction noted. Port de-accessed and flushed per protocol. Positive blood return noted. Patient Vitals for the past 12 hrs:   Temp Pulse Resp BP SpO2   07/09/20 1058 -- 75 16 122/58 --   07/09/20 0738 98.5 °F (36.9 °C) 96 18 108/57 96 %     1105. D/C'd from [facility] ambulatory and in no distress.  Next appointment is 7/14/20 at 9:00 am.

## 2020-07-09 NOTE — TELEPHONE ENCOUNTER
[facility] [place] at [facility]  [address] [facility], [address] [place] [address] Yousif: [phone]  F: [phone]    Medical Nutrition Therapy  Nutrition Encounter:    Called patient to check in. No answer left message. PEG placed on  and received education by [facility]. Patient called [facility] on  and report clog, which was fixed. No further issues. Called and left a message with patient.       Signed By: Nicholas Glover, [address] [facility] [address], [address] [place] , [address] [facility]

## 2020-07-09 NOTE — PROGRESS NOTES
The following message was sent to pt via [facility] portal in reference to lab results:    Good morning Mr. Staci Thao news, your pre op Covid test is negative.  Please let me know if you need these results forwarded to your GI specialist.   Mallory Nowak, HUGOP-C

## 2020-07-10 ENCOUNTER — TELEPHONE (OUTPATIENT)
Dept: ONCOLOGY | Age: 50
End: 2020-07-10

## 2020-07-10 NOTE — TELEPHONE ENCOUNTER
[facility] [place] at [address], [address] [place] [id number]  W: [phone]  F: [phone]    Medical Nutrition Therapy      Reason for nutrition encounter:    Called patient and she reports she is drinking about 1 bottle of water a day. She is sleeping a lot during the day. She is using the feeding tube for her nutrition but only getting 1.5 cans of Boost VHC in per day. She is flushing the tube with about 60ml of water before and after the Boost VHC. Strongly encouraged her to work up to at least 3 cans of Boost VHC this weekend. Explained that this will help give her some energy. Encouraged additional fluid through the feeding tube. Flush with 120ml before and after giving bolus feeds. She states that she feels full after giving the feeds. Encouraged her to slow down the feeds to about 15-30 mins. May need to try gravity bags. Patient asked about xray results. Angely Guo LPN called yesterday to give her the results but patient did not answer. Relied message Alicia Nguyen had sent about the xray results. She wonders about the extra fluid which I suggested she ask at next visit. Results:   Diagnosis: Oropharyngeal cancer   Started treatment on 6/9/20. Chemotherapy Flowsheet 7/9/2020   Cycle C1D29   Date 7/9/2020   Drug / Regimen Cisplatin   Pre Hydration given   Pre Meds given   Notes given       Wt Readings from Last 5 Encounters:   07/09/20 116 lb 4.8 oz (52.8 kg)   07/07/20 115 lb 11.9 oz (52.5 kg)   06/30/20 119 lb 6.4 oz (54.2 kg)   06/30/20 119 lb (54 kg)   06/23/20 122 lb 14.4 oz (55.7 kg)     Estimated Nutrition Needs:   Calorie Range: 2200-2800kcal/day     Protein Range: 60-75g/day     Fluid Needs:  2000ml     Assessment:   Inadequate oral food or beverage intake  (potential) related to concurrent chemotherapy and radiation as evidence by treatment side effects. Plan:    - Increase to 3 cans of Boost VHC over the weekend.   Flush with 120ml before and after each can. - May need to try gravity bags if unable to increase to goal amount. Goal 5 cans Boost VHC per day - flush  60ml before and after each can of Boost VHC. This will provide 2650kcal, 110g protein and 1400ml water (47oz)- includes tube feed plus flushes. I appreciate the opportunity to participate in Ms. Urmila Dickinson's care.     Signed By: Brook Torres, [address] [place] [address], [address] [place] [address] , [address]     Contact: [phone]

## 2020-07-14 DIAGNOSIS — R13.10 ODYNOPHAGIA: ICD-10-CM

## 2020-07-14 DIAGNOSIS — C01 SQUAMOUS CELL CARCINOMA OF BASE OF TONGUE (HCC): Primary | ICD-10-CM

## 2020-07-14 RX ORDER — OXYCODONE HCL 5 MG/5 ML
10 SOLUTION, ORAL ORAL
Qty: 400 ML | Refills: 0 | Status: SHIPPED | OUTPATIENT
Start: 2020-07-14 | End: 2020-07-20 | Stop reason: ALTCHOICE

## 2020-07-16 ENCOUNTER — OFFICE VISIT (OUTPATIENT)
Dept: ONCOLOGY | Age: 50
End: 2020-07-16

## 2020-07-16 ENCOUNTER — HOSPITAL ENCOUNTER (OUTPATIENT)
Dept: INFUSION THERAPY | Age: 50
Discharge: HOME | End: 2020-07-16
Payer: COMMERCIAL

## 2020-07-16 VITALS
TEMPERATURE: 99.4 F | SYSTOLIC BLOOD PRESSURE: 114 MMHG | HEART RATE: 82 BPM | RESPIRATION RATE: 16 BRPM | HEIGHT: 62 IN | WEIGHT: 111.7 LBS | BODY MASS INDEX: 20.56 KG/M2 | DIASTOLIC BLOOD PRESSURE: 64 MMHG

## 2020-07-16 VITALS
HEIGHT: 62 IN | RESPIRATION RATE: 16 BRPM | DIASTOLIC BLOOD PRESSURE: 83 MMHG | HEART RATE: 88 BPM | OXYGEN SATURATION: 96 % | BODY MASS INDEX: 21.35 KG/M2 | TEMPERATURE: 97.6 F | WEIGHT: 116 LBS | SYSTOLIC BLOOD PRESSURE: 121 MMHG

## 2020-07-16 DIAGNOSIS — C01 SQUAMOUS CELL CARCINOMA OF BASE OF TONGUE (HCC): Primary | ICD-10-CM

## 2020-07-16 LAB
ALBUMIN SERPL-MCNC: 2.9 G/DL (ref 3.5–5)
ALBUMIN/GLOB SERPL: 0.7 {RATIO} (ref 1.1–2.2)
ALP SERPL-CCNC: 80 U/L (ref 45–117)
ALT SERPL-CCNC: 22 U/L (ref 12–78)
ANION GAP SERPL CALC-SCNC: 4 MMOL/L (ref 5–15)
AST SERPL-CCNC: 15 U/L (ref 15–37)
BASOPHILS # BLD: 0 K/UL (ref 0–0.1)
BASOPHILS NFR BLD: 0 % (ref 0–1)
BILIRUB SERPL-MCNC: 0.2 MG/DL (ref 0.2–1)
BUN SERPL-MCNC: 20 MG/DL (ref 6–20)
BUN/CREAT SERPL: 29 (ref 12–20)
CALCIUM SERPL-MCNC: 9.2 MG/DL (ref 8.5–10.1)
CHLORIDE SERPL-SCNC: 97 MMOL/L (ref 97–108)
CO2 SERPL-SCNC: 31 MMOL/L (ref 21–32)
CREAT SERPL-MCNC: 0.68 MG/DL (ref 0.55–1.02)
DIFFERENTIAL METHOD BLD: ABNORMAL
EOSINOPHIL # BLD: 0 K/UL (ref 0–0.4)
EOSINOPHIL NFR BLD: 2 % (ref 0–7)
ERYTHROCYTE [DISTWIDTH] IN BLOOD BY AUTOMATED COUNT: 13.7 % (ref 11.5–14.5)
GLOBULIN SER CALC-MCNC: 4.1 G/DL (ref 2–4)
GLUCOSE SERPL-MCNC: 135 MG/DL (ref 65–100)
HCT VFR BLD AUTO: 30.1 % (ref 35–47)
HGB BLD-MCNC: 10.2 G/DL (ref 11.5–16)
IMM GRANULOCYTES # BLD AUTO: 0 K/UL (ref 0–0.04)
IMM GRANULOCYTES NFR BLD AUTO: 0 % (ref 0–0.5)
LYMPHOCYTES # BLD: 0.3 K/UL (ref 0.8–3.5)
LYMPHOCYTES NFR BLD: 16 % (ref 12–49)
MAGNESIUM SERPL-MCNC: 2.2 MG/DL (ref 1.6–2.4)
MCH RBC QN AUTO: 30.9 PG (ref 26–34)
MCHC RBC AUTO-ENTMCNC: 33.9 G/DL (ref 30–36.5)
MCV RBC AUTO: 91.2 FL (ref 80–99)
MONOCYTES # BLD: 0.1 K/UL (ref 0–1)
MONOCYTES NFR BLD: 6 % (ref 5–13)
NEUTS SEG # BLD: 1.4 K/UL (ref 1.8–8)
NEUTS SEG NFR BLD: 76 % (ref 32–75)
NRBC # BLD: 0 K/UL (ref 0–0.01)
NRBC BLD-RTO: 0 PER 100 WBC
PLATELET # BLD AUTO: 58 K/UL (ref 150–400)
PMV BLD AUTO: 10.4 FL (ref 8.9–12.9)
POTASSIUM SERPL-SCNC: 4 MMOL/L (ref 3.5–5.1)
PROT SERPL-MCNC: 7 G/DL (ref 6.4–8.2)
RBC # BLD AUTO: 3.3 M/UL (ref 3.8–5.2)
RBC MORPH BLD: ABNORMAL
SODIUM SERPL-SCNC: 132 MMOL/L (ref 136–145)
WBC # BLD AUTO: 1.8 K/UL (ref 3.6–11)

## 2020-07-16 PROCEDURE — 80053 COMPREHEN METABOLIC PANEL: CPT

## 2020-07-16 PROCEDURE — 36415 COLL VENOUS BLD VENIPUNCTURE: CPT

## 2020-07-16 PROCEDURE — 77030016057 HC NDL HUBR APOL -B

## 2020-07-16 PROCEDURE — 85025 COMPLETE CBC W/AUTO DIFF WBC: CPT

## 2020-07-16 PROCEDURE — 96365 THER/PROPH/DIAG IV INF INIT: CPT

## 2020-07-16 PROCEDURE — 83735 ASSAY OF MAGNESIUM: CPT

## 2020-07-16 PROCEDURE — 74011250636 HC RX REV CODE- 250/636: Performed by: INTERNAL MEDICINE

## 2020-07-16 RX ORDER — ALBUTEROL SULFATE 0.83 MG/ML
2.5 SOLUTION RESPIRATORY (INHALATION) AS NEEDED
Status: CANCELLED
Start: 2020-07-23

## 2020-07-16 RX ORDER — DIPHENHYDRAMINE HYDROCHLORIDE 50 MG/ML
25 INJECTION, SOLUTION INTRAMUSCULAR; INTRAVENOUS AS NEEDED
Status: CANCELLED
Start: 2020-07-23

## 2020-07-16 RX ORDER — ONDANSETRON 2 MG/ML
8 INJECTION INTRAMUSCULAR; INTRAVENOUS AS NEEDED
Status: CANCELLED | OUTPATIENT
Start: 2020-07-23

## 2020-07-16 RX ORDER — ACETAMINOPHEN 325 MG/1
650 TABLET ORAL AS NEEDED
Status: CANCELLED
Start: 2020-07-23

## 2020-07-16 RX ORDER — HYDROCORTISONE SODIUM SUCCINATE 100 MG/2ML
100 INJECTION, POWDER, FOR SOLUTION INTRAMUSCULAR; INTRAVENOUS AS NEEDED
Status: CANCELLED | OUTPATIENT
Start: 2020-07-23

## 2020-07-16 RX ORDER — EPINEPHRINE 1 MG/ML
0.3 INJECTION, SOLUTION, CONCENTRATE INTRAVENOUS AS NEEDED
Status: CANCELLED | OUTPATIENT
Start: 2020-07-23

## 2020-07-16 RX ORDER — DIPHENHYDRAMINE HYDROCHLORIDE 50 MG/ML
50 INJECTION, SOLUTION INTRAMUSCULAR; INTRAVENOUS AS NEEDED
Status: CANCELLED
Start: 2020-07-23

## 2020-07-16 RX ADMIN — POTASSIUM CHLORIDE: 2 INJECTION, SOLUTION, CONCENTRATE INTRAVENOUS at 12:09

## 2020-07-16 NOTE — PROGRESS NOTES
Edwardbry Peter is a 52 y.o. female follow up for oropharyngeal cancer. 1. Have you been to the ER, urgent care clinic since your last visit? Hospitalized since your last visit?no     2. Have you seen or consulted any other health care providers outside of the [address] [place] [address] since your last visit? Include any pap smears or colon screening.  no

## 2020-07-16 NOTE — PROGRESS NOTES
[facility] Progress Note    Date: 2020    Name: Chan Amor    MRN: [id number]         : 1970    Ms. Salinas Echevarria Arrived ambulatory and in no distress for cycle 1 day 36 of Cisplatin regimen. Assessment was completed, no acute issues at this time, no new complaints voiced pt still having difficulty swallowing . R chest port accessed without difficulty, labs drawn and in process. Plt level low today. Nuzhat RN, spoke with  MD office upstairs who stated to hold chemo today and give mag and potssium hydration bag only. Chemotherapy Flowsheet 2020   Cycle C1D36   Date 2020   Drug / Regimen Cisplatin   Pre Hydration -   Pre Meds -   Notes -         Proceeded to appt with Dr. Moni Hamm vitals were reviewed. Visit Vitals  /63 (BP 1 Location: Right arm, BP Patient Position: Sitting)   Pulse 83   Temp 99.4 °F (37.4 °C)   Resp 16   Ht 5' 2\" (1.575 m)   Wt 50.7 kg (111 lb 11.2 oz)   BMI 20.43 kg/m²       Lab results were obtained and reviewed. Recent Results (from the past 12 hour(s))   METABOLIC PANEL, COMPREHENSIVE    Collection Time: 20 10:39 AM   Result Value Ref Range    Sodium 132 (L) 136 - 145 mmol/L    Potassium 4.0 3.5 - 5.1 mmol/L    Chloride 97 97 - 108 mmol/L    CO2 31 21 - 32 mmol/L    Anion gap 4 (L) 5 - 15 mmol/L    Glucose 135 (H) 65 - 100 mg/dL    BUN 20 6 - 20 MG/DL    Creatinine 0.68 0.55 - 1.02 MG/DL    BUN/Creatinine ratio 29 (H) 12 - 20      GFR est AA >60 >60 ml/min/1.73m2    GFR est non-AA >60 >60 ml/min/1.73m2    Calcium 9.2 8.5 - 10.1 MG/DL    Bilirubin, total 0.2 0.2 - 1.0 MG/DL    ALT (SGPT) 22 12 - 78 U/L    AST (SGOT) 15 15 - 37 U/L    Alk.  phosphatase 80 45 - 117 U/L    Protein, total 7.0 6.4 - 8.2 g/dL    Albumin 2.9 (L) 3.5 - 5.0 g/dL    Globulin 4.1 (H) 2.0 - 4.0 g/dL    A-G Ratio 0.7 (L) 1.1 - 2.2     MAGNESIUM    Collection Time: 20 10:39 AM   Result Value Ref Range    Magnesium 2.2 1.6 - 2.4 mg/dL       Pre-medications  were administered as ordered and chemotherapy was initiated. Medications Administered     0.9% sodium chloride 1,000 mL with potassium chloride 10 mEq, magnesium sulfate 2 g infusion     Admin Date  07/16/2020 Action  Given Dose   Rate  1,000 mL/hr Route  IntraVENous Administered By  Chary Pelayo RN                  Ms. Shane Linares tolerated treatment well and was discharged from [place] [address] in stable condition. She is to return on 7/21 for her next appointment.     Wm Arteaga  July 16, 2020

## 2020-07-16 NOTE — PROGRESS NOTES
Cancer [place] at [place] [address] [facility]  Hilary Ser: [phone]  F: [phone]      Reason for Visit:   Hui Guallpa is a 52 y.o. female who is seen for follow up of oropharyngeal cancer. Treatment History:   · Biopsy of neck mass 3/25/2020: squamous cell carcinoma  · PET/CT 8/4/1323: Hypermetabolic right level 2 cervical lymph node likely corresponds to the biopsy proven squamous cell carcinoma. Focal increased tracer activity at the right tongue base/right vallecula is suspicious for primary malignancy. No evidence of distant metastatic disease. · Direct laryngoscopy by Dr. Marie Berg 4/13/2020: Monda Herminia tissue in right lateral tongue base without well-defined mass. Biopsies of tongue base with invasive squamous cell carcinoma, moderately differentiated, p16+. 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 Edda Burciaga on 6/8/2020  · Initiated curative intent therapy with weekly Cisplatin on 6/9/2020    History of Present Illness:   Fatigue on therapy. Left flank/left posterior chest pain persists. Worse at night with laying flat. Taking Oxycodone PRN, but doesn't seem to be helping. Pain with swallowing in throat. Taking 0.25ml of new prescription from Dr. Edda Burciaga (5mg/ml). No relief with this dose. Getting in 3 Boost C packs daily. Feedings 4 x daily. Two syringes of water before and after each feeding. Nausea is better since she has been eating more consistently. Drinking water by mouth as well. She is a smoker, working on cutting back. She is unaccompanied today. 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 She lives in [place] with her . PAST HISTORY: The following sections were reviewed and updated in the EMR as appropriate: PMH, SH, FH, Medications, Allergies.     Allergies Allergen Reactions    Tylenol-Codeine #3 [Acetaminophen-Codeine] Nausea Only      Review of Systems: A complete review of systems was obtained, reviewed. Pertinent findings reviewed above. Physical Exam:     Visit Vitals  /83 (BP 1 Location: Left arm, BP Patient Position: Sitting)   Pulse 88   Temp 97.6 °F (36.4 °C) (Temporal)   Resp 16   Ht 5' 2\" (1.575 m)   Wt 116 lb (52.6 kg)   SpO2 96%   BMI 21.22 kg/m²     ECOG PS: 0  General: No distress  Respiratory: Normal respiratory effort  CV: No peripheral edema  MS: Left flank to left lateral ribs tender to touch,improved since last visit  Abd: PEG tube site looks good, flushes with ease, no pain or tenderness to site. Skin: No rashes, ecchymoses, or petechiae  Psych: Alert, oriented, normal mood/affect    Results:     Lab Results   Component Value Date/Time    WBC 1.8 (L) 07/16/2020 10:39 AM    HGB 10.2 (L) 07/16/2020 10:39 AM    HCT 30.1 (L) 07/16/2020 10:39 AM    PLATELET 58 (L) 85/40/1368 10:39 AM    MCV 91.2 07/16/2020 10:39 AM    ABS. NEUTROPHILS 1.4 (L) 07/16/2020 10:39 AM     Lab Results   Component Value Date/Time    Sodium 132 (L) 07/16/2020 10:39 AM    Potassium 4.0 07/16/2020 10:39 AM    Chloride 97 07/16/2020 10:39 AM    CO2 31 07/16/2020 10:39 AM    Glucose 135 (H) 07/16/2020 10:39 AM    BUN 20 07/16/2020 10:39 AM    Creatinine 0.68 07/16/2020 10:39 AM    GFR est AA >60 07/16/2020 10:39 AM    GFR est non-AA >60 07/16/2020 10:39 AM    Calcium 9.2 07/16/2020 10:39 AM     Lab Results   Component Value Date/Time    Bilirubin, total 0.2 07/16/2020 10:39 AM    ALT (SGPT) 22 07/16/2020 10:39 AM    Alk. phosphatase 80 07/16/2020 10:39 AM    Protein, total 7.0 07/16/2020 10:39 AM    Albumin 2.9 (L) 07/16/2020 10:39 AM    Globulin 4.1 (H) 07/16/2020 10:39 AM     Abd xray 7/9/2020:  1. Interval development of a moderate left pleural effusion with left basilar  atelectasis. 2. Percutaneous gastrostomy tube.  No complicating features noted.    Assessment:   1) Squamous cell carcinoma of the right base of tongue  Stage I, p16+  She has biopsy proven disease in her base of tongue and right cervical node. Images personally reviewed with radiologist, node is <2cm. She has Stage I disease based on the assumption that her cancer is largely driven by HPV, which portends a more favorable prognosis. However, she is also a smoker and this could be driving her disease as well. In either case, recommendation is for definitive radiation with concurrent chemotherapy. She is currently receiving radiation with  [facility], and we are treating her with weekly Cisplatin 40mg/m2. Tolerating therapy well with exception of grade 1 nausea, grade 1 dysguesia and loss of appetite. Added Dexamethasone home dosing with week 2 of therapy. Today Plt count has dropped to 58. Will HOLD therapy today. Radiation is to be extended 2 days to make up for missed therapy days. Will delay this treatment to next week and plan for treatment with all 7 cycles if Plt recovers and remains stable. She will be seen weekly on therapy. 2) Dysphagia  Minimal on swallow study. Seen by speech pathologist.  Jenae Tomas will follow. Has had PEG tube placed now for 100% of nutrition. Drinking water by mouth now only. 3) CINV  Continue antiemetics, added dexamethasone on days 2 and 3 of each cycle with C2. Improved with PEG tube feedings. 4) Left pleural effusion  Uncertain etiology. Repeat chest xray next week to evaluate for resolution. 5) Left flank pain  Improved in the last week. Taking Oxycodone PRN. She was given new prescription with different concentration than original prescription. This caused some confusion in dosing. She was only taking 0.25ml of 5mg/ml Oxycodone. She will increase to prescribed 10ml. 6) Emotional Well Being  No psychosocial concerns identified today. Patient has adequate support.      7) Chemotherapy induced thrombocytopenia  Plt 58 today, will HOLD therapy today. Radiation is to be extended x 2 days to make up for days missed. May delay this cycle and add on to end of treatment if Plt count recovers. Plan:     · Delay C6 of Cisplatin (40mg/m2) today, resume next week with Plt recovery  · Proceed with radiation therapy with Dr. Tavo Barraza  · Labs before each treatment: CBC, BMP, Mag  · Antiemetic prophylaxis: Ondansetron, Dexamethasone, Fosaprepitant prior to each chemotherapy. Dexamethasone for three days after therapy at home.   · PRN antiemetics at home: Ondansetron, Prochlorperazine, Lorazepam  · Encouraged patient to push fluids (2L+ per day)  · Weekly labs during therapy, with IVFs 2x/week  · Oxycodone PRN  · Return to see me weekly on therapy      Signed By: Hyacinth Dalton MD

## 2020-07-20 ENCOUNTER — TELEPHONE (OUTPATIENT)
Dept: ONCOLOGY | Age: 50
End: 2020-07-20

## 2020-07-20 DIAGNOSIS — R13.10 ODYNOPHAGIA: ICD-10-CM

## 2020-07-20 DIAGNOSIS — C01 SQUAMOUS CELL CARCINOMA OF BASE OF TONGUE (HCC): Primary | ICD-10-CM

## 2020-07-20 RX ORDER — FENTANYL 50 UG/1
1 PATCH TRANSDERMAL
Qty: 10 PATCH | Refills: 0 | Status: SHIPPED | OUTPATIENT
Start: 2020-07-20 | End: 2020-08-19

## 2020-07-20 NOTE — TELEPHONE ENCOUNTER
Received a call from Dr. Shelley Vicente in [address] [place] [address] that patient is experiencing rash. Dr. Shelley Vicente and Dr. Wendi Neff have discussed new symptoms. During office visit last week patient had mild rash to right flank/side of abdomen that was mildly itchy. Advised OTC hydrocortisone cream and to call if symptoms worsen. Now patient having rash to arms and legs. Call to patient. States rash on abdomen is drying out, but rash now on arms and legs. This is now intensely itchy. Having trouble sleeping at night due to itch. States rash on arms and legs looks like how rash started to abdomen. Not painful. No drainage to skin. No exposure to new medications, ointments/lotions/soaps/detergents. Will move up office visit to see her tomorrow instead of Thursday. For now, recommend Dexamethasone 8mg today and tomorrow morning as she has this on hand. OTC Claritin for itching as she has this on hand as well. Okay to try Benadryl at night to help with itching at time of sleep. States pain in left back has resolved. She is eating more via PEG and has gained 2 lbs. Denies swelling of lower extremities. Throat pain persists, she is awaiting new prescription for \"pain patch\" from Dr. Shelley Vicente. Will see her in office tomorrow for further evaluation.

## 2020-07-21 ENCOUNTER — OFFICE VISIT (OUTPATIENT)
Dept: ONCOLOGY | Age: 50
End: 2020-07-21

## 2020-07-21 ENCOUNTER — HOSPITAL ENCOUNTER (OUTPATIENT)
Dept: INFUSION THERAPY | Age: 50
Discharge: HOME | End: 2020-07-21
Payer: COMMERCIAL

## 2020-07-21 VITALS
RESPIRATION RATE: 16 BRPM | TEMPERATURE: 97.1 F | OXYGEN SATURATION: 97 % | HEART RATE: 68 BPM | SYSTOLIC BLOOD PRESSURE: 122 MMHG | DIASTOLIC BLOOD PRESSURE: 71 MMHG

## 2020-07-21 VITALS
DIASTOLIC BLOOD PRESSURE: 74 MMHG | SYSTOLIC BLOOD PRESSURE: 123 MMHG | RESPIRATION RATE: 15 BRPM | OXYGEN SATURATION: 97 % | HEART RATE: 71 BPM | TEMPERATURE: 97.1 F

## 2020-07-21 DIAGNOSIS — R21 RASH: ICD-10-CM

## 2020-07-21 DIAGNOSIS — C01 SQUAMOUS CELL CARCINOMA OF BASE OF TONGUE (HCC): Primary | ICD-10-CM

## 2020-07-21 LAB
ANION GAP SERPL CALC-SCNC: 3 MMOL/L (ref 5–15)
BUN SERPL-MCNC: 20 MG/DL (ref 6–20)
BUN/CREAT SERPL: 29 (ref 12–20)
CALCIUM SERPL-MCNC: 9 MG/DL (ref 8.5–10.1)
CHLORIDE SERPL-SCNC: 98 MMOL/L (ref 97–108)
CO2 SERPL-SCNC: 33 MMOL/L (ref 21–32)
CREAT SERPL-MCNC: 0.7 MG/DL (ref 0.55–1.02)
GLUCOSE SERPL-MCNC: 143 MG/DL (ref 65–100)
POTASSIUM SERPL-SCNC: 3.5 MMOL/L (ref 3.5–5.1)
SODIUM SERPL-SCNC: 134 MMOL/L (ref 136–145)

## 2020-07-21 PROCEDURE — 96360 HYDRATION IV INFUSION INIT: CPT

## 2020-07-21 PROCEDURE — 80048 BASIC METABOLIC PNL TOTAL CA: CPT

## 2020-07-21 PROCEDURE — 74011250636 HC RX REV CODE- 250/636: Performed by: NURSE PRACTITIONER

## 2020-07-21 PROCEDURE — 77030016057 HC NDL HUBR APOL -B

## 2020-07-21 PROCEDURE — 36415 COLL VENOUS BLD VENIPUNCTURE: CPT

## 2020-07-21 RX ORDER — METHYLPREDNISOLONE 4 MG/1
TABLET ORAL
Qty: 1 DOSE PACK | Refills: 0 | Status: SHIPPED | OUTPATIENT
Start: 2020-07-21 | End: 2020-11-24

## 2020-07-21 RX ADMIN — SODIUM CHLORIDE 1000 ML: 900 INJECTION, SOLUTION INTRAVENOUS at 11:22

## 2020-07-21 NOTE — PROGRESS NOTES
Joshua Perez is a 52 y.o. female follow up for esophogeal cancer. 1. Have you been to the ER, urgent care clinic since your last visit? Hospitalized since your last visit?no     2. Have you seen or consulted any other health care providers outside of the [address] [place] [address] since your last visit? Include any pap smears or colon screening.  No      Vitals 7/21/2020   Blood Pressure 122/71   Pulse 68   Temp 97.1   Resp 16   Height    Weight    SpO2 97

## 2020-07-21 NOTE — PROGRESS NOTES
[facility] Progress Note    Date: 2020    Name: Christopher Cohen    MRN: [id number]         : 1970    Ms. Erica Moreno Arrived ambulatory and in no distress for Hydration Regimen. Assessment was completed, no acute issues at this time, no new complaints voiced. Right chest wall port accessed without difficulty, labs drawn & sent for processing. Ms. Charley Grimes vitals were reviewed. Patient Vitals for the past 12 hrs:   Temp Pulse Resp BP SpO2   20 1005 97.1 °F (36.2 °C) 68 16 122/71 97 %       Lab results were obtained and reviewed. Recent Results (from the past 12 hour(s))   METABOLIC PANEL, BASIC    Collection Time: 20 10:14 AM   Result Value Ref Range    Sodium 134 (L) 136 - 145 mmol/L    Potassium 3.5 3.5 - 5.1 mmol/L    Chloride 98 97 - 108 mmol/L    CO2 33 (H) 21 - 32 mmol/L    Anion gap 3 (L) 5 - 15 mmol/L    Glucose 143 (H) 65 - 100 mg/dL    BUN 20 6 - 20 MG/DL    Creatinine 0.70 0.55 - 1.02 MG/DL    BUN/Creatinine ratio 29 (H) 12 - 20      GFR est AA >60 >60 ml/min/1.73m2    GFR est non-AA >60 >60 ml/min/1.73m2    Calcium 9.0 8.5 - 10.1 MG/DL       Medications:  1 L NS    Ms. Erica Moreno tolerated treatment well and was discharged from [place] [address] in stable condition. Port de-accessed, flushed & heparinized per protocol. She is to return on 20 for her next appointment.     Berkley Huertas RN  2020

## 2020-07-21 NOTE — PROGRESS NOTES
Cancer [place] at [facility]  [address] [facility], [address] [facility] [address]  Orvan Ponto: [phone]  F: [phone]      Reason for Visit:   Berta River is a 52 y.o. female who is seen for follow up of oropharyngeal cancer. Treatment History:   · Biopsy of neck mass 3/25/2020: squamous cell carcinoma  · PET/CT 9/8/0376: Hypermetabolic right level 2 cervical lymph node likely corresponds to the biopsy proven squamous cell carcinoma. Focal increased tracer activity at the right tongue base/right vallecula is suspicious for primary malignancy. No evidence of distant metastatic disease. · Direct laryngoscopy by Dr. Chloé Centeno 4/13/2020: Augusta Hack tissue in right lateral tongue base without well-defined mass. Biopsies of tongue base with invasive squamous cell carcinoma, moderately differentiated, p16+. 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 Luther Carrillo on 6/8/2020  · Initiated curative intent therapy with weekly Cisplatin on 6/9/2020    History of Present Illness:   Presents for add-on visit due to rash. States rash developed about 10 days ago. Present initially to right flank, itching in nature. Applied hydrocortisone cream to area last week, but this did not improve symptoms. Rash now more red and irritated to flank. Now redness/bumps to arms/legs and left flank. These areas are all very itchy, she is having difficulty sleeping at night due itch. Rash is not painful. Otherwise, she continues with pain with swallowing. Applied Fentanyl patch yesterday about 3pm. Has not had Oxycodone in 2 days. Taking in water by mouth, no food. All other intake via PEG. Taking in 4 Boost VHC daily now. She is a smoker, working on cutting back. She is unaccompanied today. 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   She lives in [place] with her . PAST HISTORY: The following sections were reviewed and updated in the EMR as appropriate: PMH, SH, FH, Medications, Allergies. Allergies   Allergen Reactions    Tylenol-Codeine #3 [Acetaminophen-Codeine] Nausea Only      Review of Systems: A complete review of systems was obtained, reviewed. Pertinent findings reviewed above. Physical Exam:     Visit Vitals  /71   Pulse 68   Temp 97.1 °F (36.2 °C)   Resp 16   SpO2 97%     ECOG PS: 0  General: No distress  Eyes: PERRLA, anicteric sclerae  HENT: Atraumatic, OP clear  Neck: Supple  Lymphatic: No cervical, supraclavicular, or inguinal adenopathy  Respiratory: CTAB, normal respiratory effort  CV: Normal rate, regular rhythm, no murmurs, no peripheral edema  GI: Soft, nontender, nondistended, no masses, no hepatomegaly, no splenomegaly  MS: Normal gait and station. Digits without clubbing or cyanosis. Skin: No rashes, ecchymoses, or petechiae. Normal temperature, turgor, and texture. Psych: Alert, oriented, appropriate affect, normal judgment/insight    Results:     Lab Results   Component Value Date/Time    WBC 1.8 (L) 07/16/2020 10:39 AM    HGB 10.2 (L) 07/16/2020 10:39 AM    HCT 30.1 (L) 07/16/2020 10:39 AM    PLATELET 58 (L) 12/39/7833 10:39 AM    MCV 91.2 07/16/2020 10:39 AM    ABS. NEUTROPHILS 1.4 (L) 07/16/2020 10:39 AM     Lab Results   Component Value Date/Time    Sodium 134 (L) 07/21/2020 10:14 AM    Potassium 3.5 07/21/2020 10:14 AM    Chloride 98 07/21/2020 10:14 AM    CO2 33 (H) 07/21/2020 10:14 AM    Glucose 143 (H) 07/21/2020 10:14 AM    BUN 20 07/21/2020 10:14 AM    Creatinine 0.70 07/21/2020 10:14 AM    GFR est AA >60 07/21/2020 10:14 AM    GFR est non-AA >60 07/21/2020 10:14 AM    Calcium 9.0 07/21/2020 10:14 AM     Lab Results   Component Value Date/Time    Bilirubin, total 0.2 07/16/2020 10:39 AM    ALT (SGPT) 22 07/16/2020 10:39 AM    Alk.  phosphatase 80 07/16/2020 10:39 AM    Protein, total 7.0 07/16/2020 10:39 AM    Albumin 2.9 (L) 07/16/2020 10:39 AM    Globulin 4.1 (H) 07/16/2020 10:39 AM     Abd xray 7/9/2020:  1. Interval development of a moderate left pleural effusion with left basilar  atelectasis. 2. Percutaneous gastrostomy tube. No complicating features noted. Assessment:   1) Squamous cell carcinoma of the right base of tongue  Stage I, p16+  She has biopsy proven disease in her base of tongue and right cervical node. Images personally reviewed with radiologist, node is <2cm. She has Stage I disease based on the assumption that her cancer is largely driven by HPV, which portends a more favorable prognosis. However, she is also a smoker and this could be driving her disease as well. In either case, recommendation is for definitive radiation with concurrent chemotherapy. She is currently receiving radiation with Dr. Chuck De Leon, and we are treating her with weekly Cisplatin 40mg/m2. Tolerating therapy well with exception of grade 1 nausea, grade 1 dysguesia and loss of appetite. Added Dexamethasone home dosing with week 2 of therapy. Plt count has dropped to 58 with week 6 of therapy and treatment was delayed x 1 week. Plan to resume therapy this week Thursday if Plt recover >75. She will be seen weekly on therapy. 2) Dysphagia  Minimal on swallow study. Seen by speech pathologist.  Tressa Terrazas will follow. Has had PEG tube placed now for 100% of nutrition. Drinking water by mouth now only. 3) CINV  Continue antiemetics, added dexamethasone on days 2 and 3 of each cycle with C2. Improved with PEG tube feedings. 4) Left pleural effusion  Uncertain etiology. Repeat chest xray next week to evaluate for resolution. 5) Left flank pain  Resolved    6) Emotional Well Being  No psychosocial concerns identified today. Patient has adequate support. 7) Chemotherapy induced thrombocytopenia  Treatment delayed x 1 week last week, consider adding this on to next week if counts remain stable. 8) Rash  Diffuse rash to arms, legs and left flank. Confluent rash to right flank. Uncertain etiology although now that rash is diffuse and itchy it is most consistent with drug rash. She most recently initiated Oxycodone as only new medication, however, she has taken this in the past without issue. Will initiate Medrol dose pack for symptom relief and refer to dermatology. Plan:     · Proceed 7/23/2020 with C6 of Cisplatin (40mg/m2) today  · Proceed with radiation therapy with Dr. Azalia Martines  · Labs before each treatment: CBC, BMP, Mag  · Antiemetic prophylaxis: Ondansetron, Dexamethasone, Fosaprepitant prior to each chemotherapy. Dexamethasone for three days after therapy at home. · PRN antiemetics at home: Ondansetron, Prochlorperazine, Lorazepam  · Encouraged patient to push fluids (2L+ per day)  · Weekly labs during therapy, with IVFs 2x/week  · Oxycodone PRN, Fentanyl patch as scheduled  · Refer to dermatology  · Medrol dose pack  · Return to see me weekly on therapy    Patient was seen in conjunction with Maia Myers NP.     Signed By: Rosina Milligan MD

## 2020-07-22 DIAGNOSIS — C01 SQUAMOUS CELL CARCINOMA OF BASE OF TONGUE (HCC): Primary | ICD-10-CM

## 2020-07-22 DIAGNOSIS — R13.10 ODYNOPHAGIA: ICD-10-CM

## 2020-07-22 RX ORDER — FENTANYL 25 UG/1
1 PATCH TRANSDERMAL
Qty: 5 PATCH | Refills: 0 | Status: SHIPPED | OUTPATIENT
Start: 2020-07-22 | End: 2020-08-06

## 2020-07-23 ENCOUNTER — HOSPITAL ENCOUNTER (OUTPATIENT)
Dept: INFUSION THERAPY | Age: 50
Discharge: HOME | End: 2020-07-23
Payer: COMMERCIAL

## 2020-07-23 VITALS
DIASTOLIC BLOOD PRESSURE: 59 MMHG | HEIGHT: 62 IN | WEIGHT: 116.2 LBS | SYSTOLIC BLOOD PRESSURE: 115 MMHG | HEART RATE: 71 BPM | TEMPERATURE: 98 F | RESPIRATION RATE: 18 BRPM | OXYGEN SATURATION: 97 % | BODY MASS INDEX: 21.38 KG/M2

## 2020-07-23 DIAGNOSIS — C01 SQUAMOUS CELL CARCINOMA OF BASE OF TONGUE (HCC): Primary | ICD-10-CM

## 2020-07-23 LAB
ALBUMIN SERPL-MCNC: 3.1 G/DL (ref 3.5–5)
ALBUMIN/GLOB SERPL: 0.8 {RATIO} (ref 1.1–2.2)
ALP SERPL-CCNC: 98 U/L (ref 45–117)
ALT SERPL-CCNC: 21 U/L (ref 12–78)
ANION GAP SERPL CALC-SCNC: 2 MMOL/L (ref 5–15)
AST SERPL-CCNC: 15 U/L (ref 15–37)
BASO+EOS+MONOS # BLD AUTO: 0.5 K/UL (ref 0.2–1.2)
BASO+EOS+MONOS NFR BLD AUTO: 15 % (ref 3.2–16.9)
BILIRUB SERPL-MCNC: 0.3 MG/DL (ref 0.2–1)
BUN SERPL-MCNC: 19 MG/DL (ref 6–20)
BUN/CREAT SERPL: 24 (ref 12–20)
CALCIUM SERPL-MCNC: 9.3 MG/DL (ref 8.5–10.1)
CHLORIDE SERPL-SCNC: 99 MMOL/L (ref 97–108)
CO2 SERPL-SCNC: 33 MMOL/L (ref 21–32)
CREAT SERPL-MCNC: 0.78 MG/DL (ref 0.55–1.02)
DIFFERENTIAL METHOD BLD: ABNORMAL
ERYTHROCYTE [DISTWIDTH] IN BLOOD BY AUTOMATED COUNT: 15.3 % (ref 11.8–15.8)
GLOBULIN SER CALC-MCNC: 4 G/DL (ref 2–4)
GLUCOSE SERPL-MCNC: 160 MG/DL (ref 65–100)
HCT VFR BLD AUTO: 29.8 % (ref 35–47)
HGB BLD-MCNC: 10.2 G/DL (ref 11.5–16)
LYMPHOCYTES # BLD: 0.3 K/UL (ref 0.8–3.5)
LYMPHOCYTES NFR BLD: 8 % (ref 12–49)
MAGNESIUM SERPL-MCNC: 2.4 MG/DL (ref 1.6–2.4)
MCH RBC QN AUTO: 31.4 PG (ref 26–34)
MCHC RBC AUTO-ENTMCNC: 34.2 G/DL (ref 30–36.5)
MCV RBC AUTO: 91.7 FL (ref 80–99)
NEUTS SEG # BLD: 2.6 K/UL (ref 1.8–8)
NEUTS SEG NFR BLD: 77 % (ref 32–75)
PLATELET # BLD AUTO: 236 K/UL (ref 150–400)
POTASSIUM SERPL-SCNC: 4 MMOL/L (ref 3.5–5.1)
PROT SERPL-MCNC: 7.1 G/DL (ref 6.4–8.2)
RBC # BLD AUTO: 3.25 M/UL (ref 3.8–5.2)
SODIUM SERPL-SCNC: 134 MMOL/L (ref 136–145)
WBC # BLD AUTO: 3.4 K/UL (ref 3.6–11)

## 2020-07-23 PROCEDURE — 77030016057 HC NDL HUBR APOL -B

## 2020-07-23 PROCEDURE — 74011250636 HC RX REV CODE- 250/636: Performed by: INTERNAL MEDICINE

## 2020-07-23 PROCEDURE — 83735 ASSAY OF MAGNESIUM: CPT

## 2020-07-23 PROCEDURE — 96413 CHEMO IV INFUSION 1 HR: CPT

## 2020-07-23 PROCEDURE — 74011000258 HC RX REV CODE- 258: Performed by: INTERNAL MEDICINE

## 2020-07-23 PROCEDURE — 96367 TX/PROPH/DG ADDL SEQ IV INF: CPT

## 2020-07-23 PROCEDURE — 85025 COMPLETE CBC W/AUTO DIFF WBC: CPT

## 2020-07-23 PROCEDURE — 96375 TX/PRO/DX INJ NEW DRUG ADDON: CPT

## 2020-07-23 PROCEDURE — 36415 COLL VENOUS BLD VENIPUNCTURE: CPT

## 2020-07-23 PROCEDURE — 80053 COMPREHEN METABOLIC PANEL: CPT

## 2020-07-23 PROCEDURE — 96361 HYDRATE IV INFUSION ADD-ON: CPT

## 2020-07-23 RX ORDER — SODIUM CHLORIDE 9 MG/ML
25 INJECTION, SOLUTION INTRAVENOUS CONTINUOUS
Status: DISPENSED | OUTPATIENT
Start: 2020-07-23 | End: 2020-07-23

## 2020-07-23 RX ORDER — SODIUM CHLORIDE 9 MG/ML
10 INJECTION INTRAMUSCULAR; INTRAVENOUS; SUBCUTANEOUS AS NEEDED
Status: ACTIVE | OUTPATIENT
Start: 2020-07-23 | End: 2020-07-23

## 2020-07-23 RX ORDER — PALONOSETRON 0.05 MG/ML
0.25 INJECTION, SOLUTION INTRAVENOUS ONCE
Status: COMPLETED | OUTPATIENT
Start: 2020-07-23 | End: 2020-07-23

## 2020-07-23 RX ORDER — SODIUM CHLORIDE 0.9 % (FLUSH) 0.9 %
10 SYRINGE (ML) INJECTION AS NEEDED
Status: DISPENSED | OUTPATIENT
Start: 2020-07-23 | End: 2020-07-23

## 2020-07-23 RX ORDER — HEPARIN 100 UNIT/ML
300-500 SYRINGE INTRAVENOUS AS NEEDED
Status: ACTIVE | OUTPATIENT
Start: 2020-07-23 | End: 2020-07-23

## 2020-07-23 RX ADMIN — Medication 10 ML: at 14:10

## 2020-07-23 RX ADMIN — PALONOSETRON HYDROCHLORIDE 0.25 MG: 0.25 INJECTION INTRAVENOUS at 11:31

## 2020-07-23 RX ADMIN — FOSAPREPITANT 150 MG: 150 INJECTION, POWDER, LYOPHILIZED, FOR SOLUTION INTRAVENOUS at 11:34

## 2020-07-23 RX ADMIN — CISPLATIN 62 MG: 1 INJECTION INTRAVENOUS at 13:03

## 2020-07-23 RX ADMIN — Medication 500 UNITS: at 14:10

## 2020-07-23 RX ADMIN — SODIUM CHLORIDE 25 ML/HR: 900 INJECTION, SOLUTION INTRAVENOUS at 11:30

## 2020-07-23 RX ADMIN — DEXAMETHASONE SODIUM PHOSPHATE 12 MG: 10 INJECTION, SOLUTION INTRAMUSCULAR; INTRAVENOUS at 11:34

## 2020-07-23 RX ADMIN — POTASSIUM CHLORIDE: 2 INJECTION, SOLUTION, CONCENTRATE INTRAVENOUS at 12:00

## 2020-07-23 NOTE — PROGRESS NOTES
[facility] Progress Note    Date: 2020    Name: Dennie Elder    MRN: [id number]         : 1970    0955. Ms. Trinidad Christy Arrived ambulatory and in no distress for C1D36 of Cisplatin Regimen. Assessment was completed, patient reports tenderness to her R side of throat. Patient reports her PEG is intact. Rchest wall port accessed without difficulty, labs drawn & sent for processing. Chemotherapy Flowsheet 2020   Cycle C1D36   Date 2020   Drug / Regimen Cisplatin   Pre Hydration -   Pre Meds -   Notes -         Ms. Fidel Becerril vitals were reviewed. Patient Vitals for the past 12 hrs:   Temp Pulse Resp BP SpO2   20 1355 -- 71 -- 115/59 --   20 0957 98 °F (36.7 °C) 73 18 108/56 97 %       Lab results were obtained and reviewed. Recent Results (from the past 12 hour(s))   CBC WITH 3 PART DIFF    Collection Time: 20 10:05 AM   Result Value Ref Range    WBC 3.4 (L) 3.6 - 11.0 K/uL    RBC 3.25 (L) 3.80 - 5.20 M/uL    HGB 10.2 (L) 11.5 - 16.0 g/dL    HCT 29.8 (L) 35.0 - 47.0 %    MCV 91.7 80.0 - 99.0 FL    MCH 31.4 26.0 - 34.0 PG    MCHC 34.2 30.0 - 36.5 g/dL    RDW 15.3 11.8 - 15.8 %    PLATELET 430 040 - 823 K/uL    NEUTROPHILS 77 (H) 32 - 75 %    MIXED CELLS 15 3.2 - 16.9 %    LYMPHOCYTES 8 (L) 12 - 49 %    ABS. NEUTROPHILS 2.6 1.8 - 8.0 K/UL    ABS. MIXED CELLS 0.5 0.2 - 1.2 K/uL    ABS.  LYMPHOCYTES 0.3 (L) 0.8 - 3.5 K/UL    DF AUTOMATED     METABOLIC PANEL, COMPREHENSIVE    Collection Time: 20 10:05 AM   Result Value Ref Range    Sodium 134 (L) 136 - 145 mmol/L    Potassium 4.0 3.5 - 5.1 mmol/L    Chloride 99 97 - 108 mmol/L    CO2 33 (H) 21 - 32 mmol/L    Anion gap 2 (L) 5 - 15 mmol/L    Glucose 160 (H) 65 - 100 mg/dL    BUN 19 6 - 20 MG/DL    Creatinine 0.78 0.55 - 1.02 MG/DL    BUN/Creatinine ratio 24 (H) 12 - 20      GFR est AA >60 >60 ml/min/1.73m2    GFR est non-AA >60 >60 ml/min/1.73m2    Calcium 9.3 8.5 - 10.1 MG/DL    Bilirubin, total 0.3 0.2 - 1.0 MG/DL    ALT (SGPT) 21 12 - 78 U/L    AST (SGOT) 15 15 - 37 U/L    Alk. phosphatase 98 45 - 117 U/L    Protein, total 7.1 6.4 - 8.2 g/dL    Albumin 3.1 (L) 3.5 - 5.0 g/dL    Globulin 4.0 2.0 - 4.0 g/dL    A-G Ratio 0.8 (L) 1.1 - 2.2     MAGNESIUM    Collection Time: 07/23/20 10:05 AM   Result Value Ref Range    Magnesium 2.4 1.6 - 2.4 mg/dL       Medications:    Medications Administered     0.9% sodium chloride 1,000 mL with potassium chloride 10 mEq, magnesium sulfate 2 g infusion     Admin Date  07/23/2020 Action  Given Dose   Rate  1,000 mL/hr Route  IntraVENous Administered By  Corby Villeda RN          0.9% sodium chloride infusion     Admin Date  07/23/2020 Action  New Bag Dose  25 mL/hr Rate  25 mL/hr Route  IntraVENous Administered By  Corby Villeda RN          CISplatin (PLATINOL) 62 mg in 0.9% sodium chloride 500 mL, overfill volume 50 mL chemo infusion     Admin Date  07/23/2020 Action  New Bag Dose  62 mg Rate  612 mL/hr Route  IntraVENous Administered By  Corby Villeda RN          dexamethasone (DECADRON) 12 mg in 0.9% sodium chloride 50 mL IVPB     Admin Date  07/23/2020 Action  Given Dose  12 mg Route  IntraVENous Administered By  Corby Villeda RN          fosaprepitant (EMEND) 150 mg in 0.9% sodium chloride 150 mL IVPB     Admin Date  07/23/2020 Action  Given Dose  150 mg Rate  450 mL/hr Route  IntraVENous Administered By  Corby Villeda RN          heparin (porcine) pf 300-500 Units     Admin Date  07/23/2020 Action  Given Dose  500 Units Route  InterCATHeter Administered By  Corby Villeda RN          palonosetron HCl (ALOXI) injection 0.25 mg     Admin Date  07/23/2020 Action  Given Dose  0.25 mg Route  IntraVENous Administered By  Corby Villeda RN          saline peripheral flush soln 10 mL     Admin Date  07/23/2020 Action  Given Dose  10 mL Route  InterCATHeter Administered By  Croby Villeda RN                0123.   Ms. Bev Steel tolerated treatment well and was discharged from [place] [address] in stable condition. Port de-accessed, flushed & heparinized per protocol. She is to return on 7/29/20 for her next appointment.     Arnel Haque RN  July 23, 2020

## 2020-07-24 ENCOUNTER — TELEPHONE (OUTPATIENT)
Dept: ONCOLOGY | Age: 50
End: 2020-07-24

## 2020-07-24 NOTE — TELEPHONE ENCOUNTER
[facility] [place] at [address], [address] [facility] [address] [address]  W: [phone]  F: [phone]    Medical Nutrition Therapy      Reason for nutrition encounter:    Called patient. She reports she is up to 4 Boost VHC per day. Flushes with 2 syringes of water before and after. This is providing her with 2120kcal, 88g protein and  1600ml free water (TF+Flushes). She also reports drinking at least 2 water bottles by mouth. She states the radiation scale said she has gained 2 pounds. Denies nausea/vomiting, moving bowels- using miralax. Results:   Diagnosis: Oropharyngeal cancer   Started treatment on 6/9/20. Chemotherapy Flowsheet 7/23/2020   Cycle C1D36   Date 7/23/2020   Drug / Regimen Cisplatin   Pre Hydration -   Pre Meds -   Notes -       Wt Readings from Last 5 Encounters:   07/23/20 116 lb 3.2 oz (52.7 kg)   07/16/20 111 lb 11.2 oz (50.7 kg)   07/16/20 116 lb (52.6 kg)   07/09/20 116 lb 4.8 oz (52.8 kg)   07/07/20 115 lb 11.9 oz (52.5 kg)     Estimated Nutrition Needs:   Calorie Range: 2200-2800kcal/day     Protein Range: 60-75g/day     Fluid Needs:  2000ml     Assessment:   Inadequate oral food or beverage intake  (potential) related to concurrent chemotherapy and radiation as evidence by treatment side effects. Plan:    - Continue with 4 cans of Boost VHC over the weekend. Flush with 120ml before and after each can. - Continue with fluid by mouth as tolerated  Goal 5 cans Boost VHC per day - flush  60ml before and after each can of Boost VHC. This will provide 2650kcal, 110g protein and 1400ml water (47oz)- includes tube feed plus flushes. I appreciate the opportunity to participate in Ms. Urmila Dickinson's care.     Signed By: Cheyenne Serrano, [address] [place] [address], [address] [place] [address] , [address]     Contact: [phone]

## 2020-07-29 ENCOUNTER — HOSPITAL ENCOUNTER (OUTPATIENT)
Dept: INFUSION THERAPY | Age: 50
Discharge: HOME | End: 2020-07-29
Payer: COMMERCIAL

## 2020-07-29 ENCOUNTER — OFFICE VISIT (OUTPATIENT)
Dept: ONCOLOGY | Age: 50
End: 2020-07-29

## 2020-07-29 VITALS
TEMPERATURE: 98.3 F | OXYGEN SATURATION: 98 % | BODY MASS INDEX: 21.9 KG/M2 | HEART RATE: 89 BPM | RESPIRATION RATE: 18 BRPM | SYSTOLIC BLOOD PRESSURE: 108 MMHG | HEIGHT: 62 IN | DIASTOLIC BLOOD PRESSURE: 68 MMHG | WEIGHT: 119 LBS

## 2020-07-29 VITALS
SYSTOLIC BLOOD PRESSURE: 108 MMHG | BODY MASS INDEX: 21.9 KG/M2 | TEMPERATURE: 98.3 F | HEIGHT: 62 IN | RESPIRATION RATE: 18 BRPM | DIASTOLIC BLOOD PRESSURE: 68 MMHG | HEART RATE: 89 BPM | OXYGEN SATURATION: 98 % | WEIGHT: 119 LBS

## 2020-07-29 DIAGNOSIS — C01 SQUAMOUS CELL CARCINOMA OF BASE OF TONGUE (HCC): Primary | ICD-10-CM

## 2020-07-29 LAB
ALBUMIN SERPL-MCNC: 2.9 G/DL (ref 3.5–5)
ALBUMIN/GLOB SERPL: 0.7 {RATIO} (ref 1.1–2.2)
ALP SERPL-CCNC: 90 U/L (ref 45–117)
ALT SERPL-CCNC: 25 U/L (ref 12–78)
ANION GAP SERPL CALC-SCNC: 5 MMOL/L (ref 5–15)
AST SERPL-CCNC: 21 U/L (ref 15–37)
BASO+EOS+MONOS # BLD AUTO: 0.6 K/UL (ref 0.2–1.2)
BASO+EOS+MONOS NFR BLD AUTO: 13 % (ref 3.2–16.9)
BILIRUB SERPL-MCNC: 0.2 MG/DL (ref 0.2–1)
BUN SERPL-MCNC: 22 MG/DL (ref 6–20)
BUN/CREAT SERPL: 32 (ref 12–20)
CALCIUM SERPL-MCNC: 8.8 MG/DL (ref 8.5–10.1)
CHLORIDE SERPL-SCNC: 95 MMOL/L (ref 97–108)
CO2 SERPL-SCNC: 33 MMOL/L (ref 21–32)
CREAT SERPL-MCNC: 0.68 MG/DL (ref 0.55–1.02)
DIFFERENTIAL METHOD BLD: ABNORMAL
ERYTHROCYTE [DISTWIDTH] IN BLOOD BY AUTOMATED COUNT: 15.8 % (ref 11.8–15.8)
GLOBULIN SER CALC-MCNC: 3.9 G/DL (ref 2–4)
GLUCOSE SERPL-MCNC: 168 MG/DL (ref 65–100)
HCT VFR BLD AUTO: 29.9 % (ref 35–47)
HGB BLD-MCNC: 10.3 G/DL (ref 11.5–16)
LYMPHOCYTES # BLD: 0.4 K/UL (ref 0.8–3.5)
LYMPHOCYTES NFR BLD: 8 % (ref 12–49)
MAGNESIUM SERPL-MCNC: 2.2 MG/DL (ref 1.6–2.4)
MCH RBC QN AUTO: 31.9 PG (ref 26–34)
MCHC RBC AUTO-ENTMCNC: 34.4 G/DL (ref 30–36.5)
MCV RBC AUTO: 92.6 FL (ref 80–99)
NEUTS SEG # BLD: 4.1 K/UL (ref 1.8–8)
NEUTS SEG NFR BLD: 80 % (ref 32–75)
PLATELET # BLD AUTO: 260 K/UL (ref 150–400)
POTASSIUM SERPL-SCNC: 3.9 MMOL/L (ref 3.5–5.1)
PROT SERPL-MCNC: 6.8 G/DL (ref 6.4–8.2)
RBC # BLD AUTO: 3.23 M/UL (ref 3.8–5.2)
SODIUM SERPL-SCNC: 133 MMOL/L (ref 136–145)
WBC # BLD AUTO: 5.1 K/UL (ref 3.6–11)

## 2020-07-29 PROCEDURE — 77030016057 HC NDL HUBR APOL -B

## 2020-07-29 PROCEDURE — 80053 COMPREHEN METABOLIC PANEL: CPT

## 2020-07-29 PROCEDURE — 96360 HYDRATION IV INFUSION INIT: CPT

## 2020-07-29 PROCEDURE — 83735 ASSAY OF MAGNESIUM: CPT

## 2020-07-29 PROCEDURE — 74011250636 HC RX REV CODE- 250/636: Performed by: NURSE PRACTITIONER

## 2020-07-29 PROCEDURE — 85025 COMPLETE CBC W/AUTO DIFF WBC: CPT

## 2020-07-29 PROCEDURE — 36415 COLL VENOUS BLD VENIPUNCTURE: CPT

## 2020-07-29 RX ADMIN — SODIUM CHLORIDE 1000 ML: 900 INJECTION, SOLUTION INTRAVENOUS at 11:05

## 2020-07-29 NOTE — PROGRESS NOTES
[facility] Progress Note    Date: 2020    Name: Reuben Spaulding    MRN: [id number]         : 1970    Ms. Rosaura Douglass Arrived ambulatory and in no distress for Hydration. Assessment was completed, patient complaining of fatigue and pain in the throat. Right chest wall port accessed without difficulty, labs drawn & sent for processing. Patient to return for chemo tomorrow. Labs drawn for chemo, and patient proceeded to appointment with Dr. Theo Hernandez. Ms. Silas Claudio vitals were reviewed. Visit Vitals  /68 (BP 1 Location: Left arm, BP Patient Position: At rest)   Pulse 89   Temp 98.3 °F (36.8 °C)   Resp 18   Ht 5' 2\" (1.575 m)   Wt 54 kg (119 lb)   SpO2 98%   BMI 21.77 kg/m²       Lab results were obtained and reviewed. Recent Results (from the past 12 hour(s))   MAGNESIUM    Collection Time: 20 10:15 AM   Result Value Ref Range    Magnesium 2.2 1.6 - 2.4 mg/dL   METABOLIC PANEL, COMPREHENSIVE    Collection Time: 20 10:15 AM   Result Value Ref Range    Sodium 133 (L) 136 - 145 mmol/L    Potassium 3.9 3.5 - 5.1 mmol/L    Chloride 95 (L) 97 - 108 mmol/L    CO2 33 (H) 21 - 32 mmol/L    Anion gap 5 5 - 15 mmol/L    Glucose 168 (H) 65 - 100 mg/dL    BUN 22 (H) 6 - 20 MG/DL    Creatinine 0.68 0.55 - 1.02 MG/DL    BUN/Creatinine ratio 32 (H) 12 - 20      GFR est AA >60 >60 ml/min/1.73m2    GFR est non-AA >60 >60 ml/min/1.73m2    Calcium 8.8 8.5 - 10.1 MG/DL    Bilirubin, total 0.2 0.2 - 1.0 MG/DL    ALT (SGPT) 25 12 - 78 U/L    AST (SGOT) 21 15 - 37 U/L    Alk.  phosphatase 90 45 - 117 U/L    Protein, total 6.8 6.4 - 8.2 g/dL    Albumin 2.9 (L) 3.5 - 5.0 g/dL    Globulin 3.9 2.0 - 4.0 g/dL    A-G Ratio 0.7 (L) 1.1 - 2.2     CBC WITH 3 PART DIFF    Collection Time: 20 10:44 AM   Result Value Ref Range    WBC 5.1 3.6 - 11.0 K/uL    RBC 3.23 (L) 3.80 - 5.20 M/uL    HGB 10.3 (L) 11.5 - 16.0 g/dL    HCT 29.9 (L) 35.0 - 47.0 %    MCV 92.6 80.0 - 99.0 FL    MCH 31.9 26.0 - 34.0 PG    MCHC 34.4 30.0 - 36.5 g/dL    RDW 15.8 11.8 - 15.8 %    PLATELET 594 590 - 008 K/uL    NEUTROPHILS 80 (H) 32 - 75 %    MIXED CELLS 13 3.2 - 16.9 %    LYMPHOCYTES 8 (L) 12 - 49 %    ABS. NEUTROPHILS 4.1 1.8 - 8.0 K/UL    ABS. MIXED CELLS 0.6 0.2 - 1.2 K/uL    ABS. LYMPHOCYTES 0.4 (L) 0.8 - 3.5 K/UL    DF AUTOMATED         Medications:  1 L Bolus     Ms. Clara Zuniga tolerated treatment well and was discharged from [place] [address] in stable condition. Port de-accessed, flushed & heparinized per protocol. She is to return on July 30 for her next appointment.      Marzena Connolly RN  July 29, 2020

## 2020-07-29 NOTE — PROGRESS NOTES
[facility] [place] at [place] [address] [facility] [facility] : [phone]  F: [phone]      Reason for Visit:   Rhett Mccarthy is a 52 y.o. female who is seen for follow up of oropharyngeal cancer. Treatment History:   · Biopsy of neck mass 3/25/2020: squamous cell carcinoma  · PET/CT 9/7/5480: Hypermetabolic right level 2 cervical lymph node likely corresponds to the biopsy proven squamous cell carcinoma. Focal increased tracer activity at the right tongue base/right vallecula is suspicious for primary malignancy. No evidence of distant metastatic disease. · Direct laryngoscopy by Dr. Skye Reeder 4/13/2020: [place] tissue in right lateral tongue base without well-defined mass. Biopsies of tongue base with invasive squamous cell carcinoma, moderately differentiated, p16+. 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 Bulmaro Saab on 6/8/2020, completed 7/30/2020  · Initiated curative intent therapy with weekly Cisplatin on 6/9/2020, completed 7/30/2020    History of Present Illness:   Presents for follow up on therapy. Getting in 4 Boost daily via PEG. Flushing with water before and after each feeding. 2 whole syringes before and after. Drinking 1-2 bottles of water by mouth. Pain with swallowing, taking pain medications as prescribed. Rash has resolved. She is a smoker, working on [employer] back. She is unaccompanied today. 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 She lives in [place] with her . PAST HISTORY: The following sections were reviewed and updated in the EMR as appropriate: PMH, SH, FH, Medications, Allergies.     Allergies   Allergen Reactions    Tylenol-Codeine #3 [Acetaminophen-Codeine] Nausea Only      Review of Systems: A complete review of systems was obtained, reviewed. Pertinent findings reviewed above. Physical Exam:     Visit Vitals  /68   Pulse 89   Temp 98.3 °F (36.8 °C)   Resp 18   Ht 5' 2\" (1.575 m)   Wt 119 lb (54 kg)   SpO2 98%   BMI 21.77 kg/m²     ECOG PS: 0  General: No distress  Eyes: PERRLA, anicteric sclerae  HENT: Atraumatic, OP clear  Neck: Supple  Lymphatic: No cervical, supraclavicular, or inguinal adenopathy  Respiratory: CTAB, normal respiratory effort  CV: Normal rate, regular rhythm, no murmurs, no peripheral edema  GI: Soft, nontender, nondistended, no masses, no hepatomegaly, no splenomegaly  MS: Normal gait and station. Digits without clubbing or cyanosis. Skin: No rashes, ecchymoses, or petechiae. Normal temperature, turgor, and texture. Psych: Alert, oriented, appropriate affect, normal judgment/insight    Results:     Lab Results   Component Value Date/Time    WBC 5.1 07/29/2020 10:44 AM    HGB 10.3 (L) 07/29/2020 10:44 AM    HCT 29.9 (L) 07/29/2020 10:44 AM    PLATELET 742 14/96/3620 10:44 AM    MCV 92.6 07/29/2020 10:44 AM    ABS. NEUTROPHILS 4.1 07/29/2020 10:44 AM     Lab Results   Component Value Date/Time    Sodium 134 (L) 07/23/2020 10:05 AM    Potassium 4.0 07/23/2020 10:05 AM    Chloride 99 07/23/2020 10:05 AM    CO2 33 (H) 07/23/2020 10:05 AM    Glucose 160 (H) 07/23/2020 10:05 AM    BUN 19 07/23/2020 10:05 AM    Creatinine 0.78 07/23/2020 10:05 AM    GFR est AA >60 07/23/2020 10:05 AM    GFR est non-AA >60 07/23/2020 10:05 AM    Calcium 9.3 07/23/2020 10:05 AM     Lab Results   Component Value Date/Time    Bilirubin, total 0.3 07/23/2020 10:05 AM    ALT (SGPT) 21 07/23/2020 10:05 AM    Alk. phosphatase 98 07/23/2020 10:05 AM    Protein, total 7.1 07/23/2020 10:05 AM    Albumin 3.1 (L) 07/23/2020 10:05 AM    Globulin 4.0 07/23/2020 10:05 AM     Abd xray 7/9/2020:  1. Interval development of a moderate left pleural effusion with left basilar  atelectasis. 2. Percutaneous gastrostomy tube.  No complicating features noted.    Assessment:   1) Squamous cell carcinoma of the right base of tongue  Stage I, p16+  She has biopsy proven disease in her base of tongue and right cervical node. Images personally reviewed with radiologist, node is <2cm. She has Stage I disease based on the assumption that her cancer is largely driven by HPV, which portends a more favorable prognosis. However, she is also a smoker and this could be driving her disease as well. In either case, recommendation is for definitive radiation with concurrent chemotherapy. She is currently receiving radiation with Dr. Glenn Dodge, and we are treating her with weekly Cisplatin 40mg/m2. Tolerating therapy well with exception of grade 1 nausea, grade 1 dysguesia and loss of appetite. Added Dexamethasone home dosing with week 2 of therapy. Plt count has dropped to 58 with week 6 of therapy and treatment was delayed x 1 week. Will complete therapy this week, plan to follow with supportive care with IV fluids twice weekly. 2) Dysphagia  Minimal on swallow study. Seen by speech pathologist.  Lenny Mcdaniel will follow. Has had PEG tube placed now for 100% of nutrition. Drinking water by mouth now only. 3) CINV  Continue antiemetics, added dexamethasone on days 2 and 3 of each cycle with C2. Improved with PEG tube feedings. 4) Left pleural effusion  Uncertain etiology. Repeat chest xray next week to evaluate for resolution. 5) Left flank pain  Resolved    6) Emotional Well Being  No psychosocial concerns identified today. Patient has adequate support. 7) Chemotherapy induced thrombocytopenia  Treatment delayed x 1 week last week, consider adding this on to next week if counts remain stable. 8) Rash  Resolved.      Plan:     · Proceed 7/30/2020 with C7 of Cisplatin (40mg/m2) today  · Proceed with radiation therapy with Dr. Glenn Dodge  · Labs before each treatment: CBC, BMP, Mag  · Antiemetic prophylaxis: Ondansetron, Dexamethasone, Fosaprepitant prior to each chemotherapy. Dexamethasone for three days after therapy at home. · PRN antiemetics at home: Ondansetron, Prochlorperazine, Lorazepam  · Encouraged patient to push fluids (2L+ per day)  · Weekly labs during therapy, with IVFs once weekly  · Oxycodone PRN, Fentanyl patch as scheduled  · Return to see me weekly for fluids    Patient was seen in conjunction with Tamy Bonilla NP.     Signed By: Riana Canales NP

## 2020-07-29 NOTE — PROGRESS NOTES
Tricia Sweeney is a 52 y.o. female here for follow up of head & neck cancer. 1. Have you been to the ER, urgent care clinic since your last visit? Hospitalized since your last visit? No    2. Have you seen or consulted any other health care providers outside of the [address] [place] [address] since your last visit? Include any pap smears or colon screening.  No

## 2020-07-30 ENCOUNTER — HOSPITAL ENCOUNTER (OUTPATIENT)
Dept: INFUSION THERAPY | Age: 50
Discharge: HOME | End: 2020-07-30
Payer: COMMERCIAL

## 2020-07-30 VITALS
SYSTOLIC BLOOD PRESSURE: 118 MMHG | WEIGHT: 121.2 LBS | OXYGEN SATURATION: 98 % | DIASTOLIC BLOOD PRESSURE: 71 MMHG | HEART RATE: 95 BPM | HEIGHT: 62 IN | BODY MASS INDEX: 22.31 KG/M2 | TEMPERATURE: 98.8 F | RESPIRATION RATE: 18 BRPM

## 2020-07-30 DIAGNOSIS — C01 SQUAMOUS CELL CARCINOMA OF BASE OF TONGUE (HCC): ICD-10-CM

## 2020-07-30 DIAGNOSIS — R13.10 ODYNOPHAGIA: ICD-10-CM

## 2020-07-30 DIAGNOSIS — C01 SQUAMOUS CELL CARCINOMA OF BASE OF TONGUE (HCC): Primary | ICD-10-CM

## 2020-07-30 PROCEDURE — 96375 TX/PRO/DX INJ NEW DRUG ADDON: CPT

## 2020-07-30 PROCEDURE — 96413 CHEMO IV INFUSION 1 HR: CPT

## 2020-07-30 PROCEDURE — 77030016057 HC NDL HUBR APOL -B

## 2020-07-30 PROCEDURE — 74011000258 HC RX REV CODE- 258: Performed by: INTERNAL MEDICINE

## 2020-07-30 PROCEDURE — 74011250636 HC RX REV CODE- 250/636: Performed by: INTERNAL MEDICINE

## 2020-07-30 PROCEDURE — 96367 TX/PROPH/DG ADDL SEQ IV INF: CPT

## 2020-07-30 PROCEDURE — 96361 HYDRATE IV INFUSION ADD-ON: CPT

## 2020-07-30 RX ORDER — OXYCODONE HCL 5 MG/5 ML
10 SOLUTION, ORAL ORAL
Qty: 400 ML | Refills: 0 | Status: SHIPPED | OUTPATIENT
Start: 2020-07-30 | End: 2020-08-13

## 2020-07-30 RX ORDER — PALONOSETRON 0.05 MG/ML
0.25 INJECTION, SOLUTION INTRAVENOUS ONCE
Status: COMPLETED | OUTPATIENT
Start: 2020-07-30 | End: 2020-07-30

## 2020-07-30 RX ORDER — SODIUM CHLORIDE 9 MG/ML
10 INJECTION INTRAMUSCULAR; INTRAVENOUS; SUBCUTANEOUS AS NEEDED
Status: ACTIVE | OUTPATIENT
Start: 2020-07-30 | End: 2020-07-30

## 2020-07-30 RX ORDER — SODIUM CHLORIDE 0.9 % (FLUSH) 0.9 %
10 SYRINGE (ML) INJECTION AS NEEDED
Status: DISPENSED | OUTPATIENT
Start: 2020-07-30 | End: 2020-07-30

## 2020-07-30 RX ORDER — HEPARIN 100 UNIT/ML
300-500 SYRINGE INTRAVENOUS AS NEEDED
Status: ACTIVE | OUTPATIENT
Start: 2020-07-30 | End: 2020-07-30

## 2020-07-30 RX ORDER — SODIUM CHLORIDE 9 MG/ML
25 INJECTION, SOLUTION INTRAVENOUS CONTINUOUS
Status: DISPENSED | OUTPATIENT
Start: 2020-07-30 | End: 2020-07-30

## 2020-07-30 RX ADMIN — SODIUM CHLORIDE 25 ML/HR: 900 INJECTION, SOLUTION INTRAVENOUS at 11:36

## 2020-07-30 RX ADMIN — Medication 10 ML: at 14:20

## 2020-07-30 RX ADMIN — Medication 500 UNITS: at 14:20

## 2020-07-30 RX ADMIN — CISPLATIN 62 MG: 1 INJECTION INTRAVENOUS at 13:06

## 2020-07-30 RX ADMIN — DEXAMETHASONE SODIUM PHOSPHATE 12 MG: 10 INJECTION, SOLUTION INTRAMUSCULAR; INTRAVENOUS at 11:41

## 2020-07-30 RX ADMIN — FOSAPREPITANT 150 MG: 150 INJECTION, POWDER, LYOPHILIZED, FOR SOLUTION INTRAVENOUS at 11:39

## 2020-07-30 RX ADMIN — PALONOSETRON HYDROCHLORIDE 0.25 MG: 0.25 INJECTION INTRAVENOUS at 11:37

## 2020-07-30 RX ADMIN — POTASSIUM CHLORIDE: 2 INJECTION, SOLUTION, CONCENTRATE INTRAVENOUS at 12:04

## 2020-07-30 NOTE — PROGRESS NOTES
[facility] Progress Note    Date: 2020    Name: Vaishali Armenta    MRN: [id number]         : 1970    1110. Ms. Caro Arrived ambulatory and in no distress for C1D43 of Cisplatin Regimen. Assessment was completed, patient reports continued soreness to throat. R chest wall port accessed without difficulty, labs drawn & sent for processing. Lab results were reviewed from yesterday and within limits for treatment today. Chemotherapy Flowsheet 2020   Cycle C1D43   Date 2020   Drug / Regimen Cisplatin   Pre Hydration -   Pre Meds -   Notes -       Ms. Maurizio Shook vitals were reviewed.     Patient Vitals for the past 12 hrs:   Temp Pulse Resp BP SpO2   20 1417 -- 95 -- 118/71 --   20 1118 98.8 °F (37.1 °C) 97 18 110/62 98 %         Medications:      Medications Administered     0.9% sodium chloride 1,000 mL with potassium chloride 10 mEq, magnesium sulfate 2 g infusion     Admin Date  2020 Action  Given Dose   Rate  1,000 mL/hr Route  IntraVENous Administered By  Case Lynne RN          0.9% sodium chloride infusion     Admin Date  2020 Action  New Bag Dose  25 mL/hr Rate  25 mL/hr Route  IntraVENous Administered By  Case Lynne RN          CISplatin (PLATINOL) 62 mg in 0.9% sodium chloride 500 mL, overfill volume 50 mL chemo infusion     Admin Date  2020 Action  New Bag Dose  62 mg Rate  612 mL/hr Route  IntraVENous Administered By  Case Lynne RN          dexamethasone (DECADRON) 12 mg in 0.9% sodium chloride 50 mL IVPB     Admin Date  2020 Action  Given Dose  12 mg Route  IntraVENous Administered By  Case Lynne RN          fosaprepitant (EMEND) 150 mg in 0.9% sodium chloride 150 mL IVPB     Admin Date  2020 Action  Given Dose  150 mg Rate  450 mL/hr Route  IntraVENous Administered By  Case Lynne RN          heparin (porcine) pf 300-500 Units     Admin Date  2020 Action  Given Dose  500 Units Route  InterCATHeter Administered By  Nadira Pichardo RN          palonosetron HCl (ALOXI) injection 0.25 mg     Admin Date  07/30/2020 Action  Given Dose  0.25 mg Route  IntraVENous Administered By  Nadira Pichardo RN          saline peripheral flush soln 10 mL     Admin Date  07/30/2020 Action  Given Dose  10 mL Route  InterCATHeter Administered By  Nadira Pichardo RN                5063. Ms. Joselyn Fontenot tolerated treatment well and was discharged from [place] [address] in stable condition. Port de-accessed, flushed & heparinized per protocol. She is to return on 8/06/20 for her next appointment.     Anabella Haney RN  July 30, 2020

## 2020-08-04 ENCOUNTER — TELEPHONE (OUTPATIENT)
Dept: ONCOLOGY | Age: 50
End: 2020-08-04

## 2020-08-06 ENCOUNTER — HOSPITAL ENCOUNTER (OUTPATIENT)
Dept: INFUSION THERAPY | Age: 50
Discharge: HOME | End: 2020-08-06
Payer: COMMERCIAL

## 2020-08-06 VITALS
SYSTOLIC BLOOD PRESSURE: 115 MMHG | DIASTOLIC BLOOD PRESSURE: 75 MMHG | TEMPERATURE: 97.6 F | OXYGEN SATURATION: 98 % | RESPIRATION RATE: 18 BRPM | HEART RATE: 86 BPM

## 2020-08-06 DIAGNOSIS — C01 SQUAMOUS CELL CARCINOMA OF BASE OF TONGUE (HCC): Primary | ICD-10-CM

## 2020-08-06 LAB
ANION GAP SERPL CALC-SCNC: 6 MMOL/L (ref 5–15)
BUN SERPL-MCNC: 21 MG/DL (ref 6–20)
BUN/CREAT SERPL: 31 (ref 12–20)
CALCIUM SERPL-MCNC: 9.5 MG/DL (ref 8.5–10.1)
CHLORIDE SERPL-SCNC: 99 MMOL/L (ref 97–108)
CO2 SERPL-SCNC: 30 MMOL/L (ref 21–32)
CREAT SERPL-MCNC: 0.68 MG/DL (ref 0.55–1.02)
GLUCOSE SERPL-MCNC: 108 MG/DL (ref 65–100)
POTASSIUM SERPL-SCNC: 4.2 MMOL/L (ref 3.5–5.1)
SODIUM SERPL-SCNC: 135 MMOL/L (ref 136–145)

## 2020-08-06 PROCEDURE — 77030016057 HC NDL HUBR APOL -B

## 2020-08-06 PROCEDURE — 74011250636 HC RX REV CODE- 250/636: Performed by: NURSE PRACTITIONER

## 2020-08-06 PROCEDURE — 80048 BASIC METABOLIC PNL TOTAL CA: CPT

## 2020-08-06 PROCEDURE — 96375 TX/PRO/DX INJ NEW DRUG ADDON: CPT

## 2020-08-06 PROCEDURE — 96360 HYDRATION IV INFUSION INIT: CPT

## 2020-08-06 PROCEDURE — 36415 COLL VENOUS BLD VENIPUNCTURE: CPT

## 2020-08-06 RX ORDER — ONDANSETRON 2 MG/ML
8 INJECTION INTRAMUSCULAR; INTRAVENOUS ONCE
Status: COMPLETED | OUTPATIENT
Start: 2020-08-06 | End: 2020-08-06

## 2020-08-06 RX ADMIN — SODIUM CHLORIDE 1000 ML: 900 INJECTION, SOLUTION INTRAVENOUS at 10:20

## 2020-08-06 RX ADMIN — ONDANSETRON 8 MG: 2 INJECTION INTRAMUSCULAR; INTRAVENOUS at 10:53

## 2020-08-07 ENCOUNTER — TELEPHONE (OUTPATIENT)
Dept: ONCOLOGY | Age: 50
End: 2020-08-07

## 2020-08-07 NOTE — TELEPHONE ENCOUNTER
[address] [place] [address]  at [place]  [phone]    08/07/20- Spoke to patient, stated she was doing well with tube feeds on Wednesday, but that night started vomiting. Stated she was unable to keep anything down all day yesterday. Patient confirms having regular bowel movements, denies fevers. Feels fluids and tube feeds are going in fine, but has nausea and vomiting after. Stated she's taking anti-emetics, but not on a regular basis. Did a tube feed about 20 minutes before our conversation and that's staying down so far. Discussed with Keshawn Olmstead, she recommended taking compazine now with some water. Hold off on tube feeds for a couple hours until stomach settles, then try to do a little bit at a time. Instructed patient to alternate anti-emetics around the clock for the next couple days until nausea is controlled. Encouraged her to call back this afternoon if vomiting persists despite anti-emetics. She verbalized understanding.

## 2020-08-07 NOTE — TELEPHONE ENCOUNTER
Can you call back to Teri at radiation---per teri pt is still vomiting not able to hold anything down

## 2020-08-10 ENCOUNTER — TELEPHONE (OUTPATIENT)
Dept: ONCOLOGY | Age: 50
End: 2020-08-10

## 2020-08-10 NOTE — TELEPHONE ENCOUNTER
[facility] [place] at [place]  [address] [facility], [address] [place] [address] Divers: [phone]  F: [phone]    Medical Nutrition Therapy  Nutrition Encounter:    Called patient to check in. No answer left message.      Signed By: April Harding, [address] [place] [address], [address] [facility] [address]

## 2020-08-13 ENCOUNTER — HOSPITAL ENCOUNTER (OUTPATIENT)
Dept: INFUSION THERAPY | Age: 50
Discharge: HOME | End: 2020-08-13
Payer: COMMERCIAL

## 2020-08-13 VITALS
SYSTOLIC BLOOD PRESSURE: 112 MMHG | HEART RATE: 71 BPM | DIASTOLIC BLOOD PRESSURE: 65 MMHG | OXYGEN SATURATION: 97 % | RESPIRATION RATE: 16 BRPM | TEMPERATURE: 98 F

## 2020-08-13 DIAGNOSIS — C01 SQUAMOUS CELL CARCINOMA OF BASE OF TONGUE (HCC): Primary | ICD-10-CM

## 2020-08-13 LAB
ANION GAP SERPL CALC-SCNC: 7 MMOL/L (ref 5–15)
BUN SERPL-MCNC: 23 MG/DL (ref 6–20)
BUN/CREAT SERPL: 31 (ref 12–20)
CALCIUM SERPL-MCNC: 8.7 MG/DL (ref 8.5–10.1)
CHLORIDE SERPL-SCNC: 100 MMOL/L (ref 97–108)
CO2 SERPL-SCNC: 29 MMOL/L (ref 21–32)
CREAT SERPL-MCNC: 0.74 MG/DL (ref 0.55–1.02)
GLUCOSE SERPL-MCNC: 162 MG/DL (ref 65–100)
POTASSIUM SERPL-SCNC: 3.3 MMOL/L (ref 3.5–5.1)
SODIUM SERPL-SCNC: 136 MMOL/L (ref 136–145)

## 2020-08-13 PROCEDURE — 74011000250 HC RX REV CODE- 250: Performed by: INTERNAL MEDICINE

## 2020-08-13 PROCEDURE — 77030012965 HC NDL HUBR BBMI -A

## 2020-08-13 PROCEDURE — 74011250636 HC RX REV CODE- 250/636: Performed by: INTERNAL MEDICINE

## 2020-08-13 PROCEDURE — 36415 COLL VENOUS BLD VENIPUNCTURE: CPT

## 2020-08-13 PROCEDURE — 80048 BASIC METABOLIC PNL TOTAL CA: CPT

## 2020-08-13 PROCEDURE — 96360 HYDRATION IV INFUSION INIT: CPT

## 2020-08-13 PROCEDURE — 74011250636 HC RX REV CODE- 250/636: Performed by: NURSE PRACTITIONER

## 2020-08-13 RX ORDER — SODIUM CHLORIDE 0.9 % (FLUSH) 0.9 %
5-10 SYRINGE (ML) INJECTION AS NEEDED
Status: DISCONTINUED | OUTPATIENT
Start: 2020-08-13 | End: 2020-08-14 | Stop reason: HOSPADM

## 2020-08-13 RX ORDER — SODIUM CHLORIDE 9 MG/ML
10 INJECTION INTRAMUSCULAR; INTRAVENOUS; SUBCUTANEOUS AS NEEDED
Status: DISCONTINUED | OUTPATIENT
Start: 2020-08-13 | End: 2020-08-14 | Stop reason: HOSPADM

## 2020-08-13 RX ORDER — HEPARIN 100 UNIT/ML
500 SYRINGE INTRAVENOUS AS NEEDED
Status: DISCONTINUED | OUTPATIENT
Start: 2020-08-13 | End: 2020-08-14 | Stop reason: HOSPADM

## 2020-08-13 RX ADMIN — SODIUM CHLORIDE 1000 ML: 900 INJECTION, SOLUTION INTRAVENOUS at 09:52

## 2020-08-13 RX ADMIN — Medication 10 ML: at 09:54

## 2020-08-13 RX ADMIN — SODIUM CHLORIDE 10 ML: 9 INJECTION, SOLUTION INTRAMUSCULAR; INTRAVENOUS; SUBCUTANEOUS at 09:52

## 2020-08-13 RX ADMIN — SODIUM CHLORIDE 10 ML: 9 INJECTION, SOLUTION INTRAMUSCULAR; INTRAVENOUS; SUBCUTANEOUS at 09:53

## 2020-08-13 RX ADMIN — Medication 500 UNITS: at 10:55

## 2020-08-13 NOTE — PROGRESS NOTES
[facility] Short Visit Progress Note    0972 Patient admitted to [facility] for labs/hydration ambulatory in stable condition. Assessment completed. No new concerns voiced. Vital Signs:  Visit Vitals  /65   Pulse 71   Temp 98 °F (36.7 °C)   Resp 16   SpO2 97%         PAC with positive blood return. Lab Results:  Recent Results (from the past 12 hour(s))   METABOLIC PANEL, BASIC    Collection Time: 08/13/20  9:48 AM   Result Value Ref Range    Sodium 136 136 - 145 mmol/L    Potassium 3.3 (L) 3.5 - 5.1 mmol/L    Chloride 100 97 - 108 mmol/L    CO2 29 21 - 32 mmol/L    Anion gap 7 5 - 15 mmol/L    Glucose 162 (H) 65 - 100 mg/dL    BUN 23 (H) 6 - 20 MG/DL    Creatinine 0.74 0.55 - 1.02 MG/DL    BUN/Creatinine ratio 31 (H) 12 - 20      GFR est AA >60 >60 ml/min/1.73m2    GFR est non-AA >60 >60 ml/min/1.73m2    Calcium 8.7 8.5 - 10.1 MG/DL     Pt c/o diarrhea for past week. RN notified MD office and relayed info to patient: take immodium 2 tabs with first stool and 1 per each additional up to 1 per 4 hrs. Medications:  Medications Administered     0.9% sodium chloride injection 10 mL     Admin Date  08/13/2020 Action  Given Dose  10 mL Route  IntraVENous Administered By  Corina Angulo RN           Admin Date  08/13/2020 Action  Given Dose  10 mL Route  IntraVENous Administered By  Corina Angulo RN          heparin (porcine) pf 500 Units     Admin Date  08/13/2020 Action  Given Dose  500 Units Route  IntraVENous Administered By  Corina Angulo RN          sodium chloride (NS) flush 5-10 mL     Admin Date  08/13/2020 Action  Given Dose  10 mL Route  IntraVENous Administered By  Corina Angulo RN          sodium chloride 0.9 % bolus infusion 1,000 mL     Admin Date  08/13/2020 Action  New Bag Dose  1000 mL Rate  1,000 mL/hr Route  IntraVENous Administered By  Corina Angulo RN                1100Patient tolerated treatment well.  Patient discharged from [place] [address] ambulatory in no distress at 1100 Patient aware of next appointment.     Future Appointments   Date Time Provider Sherman Mcnally   8/20/2020  9:30 AM [facility]   8/27/2020  9:30 AM SS INF6 CH4 <1H [facility] ST. Evelyn Sosa

## 2020-08-17 ENCOUNTER — TELEPHONE (OUTPATIENT)
Dept: ONCOLOGY | Age: 50
End: 2020-08-17

## 2020-08-17 NOTE — TELEPHONE ENCOUNTER
[facility] [place] at [address], [address] [place] [address] [phone]  F: [phone]    Medical Nutrition Therapy  Nutrition Encounter:    Called patient to check in. No answer left message.      Signed By: Ezio Darden, [address] [place] [address], [address] [facility] [address]

## 2020-08-20 ENCOUNTER — OFFICE VISIT (OUTPATIENT)
Dept: ONCOLOGY | Age: 50
End: 2020-08-20
Payer: COMMERCIAL

## 2020-08-20 ENCOUNTER — HOSPITAL ENCOUNTER (OUTPATIENT)
Dept: INFUSION THERAPY | Age: 50
Discharge: HOME | End: 2020-08-20
Payer: COMMERCIAL

## 2020-08-20 ENCOUNTER — DOCUMENTATION ONLY (OUTPATIENT)
Dept: ONCOLOGY | Age: 50
End: 2020-08-20

## 2020-08-20 VITALS
HEIGHT: 62 IN | OXYGEN SATURATION: 95 % | SYSTOLIC BLOOD PRESSURE: 99 MMHG | DIASTOLIC BLOOD PRESSURE: 58 MMHG | TEMPERATURE: 97.7 F | BODY MASS INDEX: 22.16 KG/M2 | HEART RATE: 99 BPM | WEIGHT: 120.4 LBS | RESPIRATION RATE: 18 BRPM

## 2020-08-20 VITALS
TEMPERATURE: 97.7 F | DIASTOLIC BLOOD PRESSURE: 58 MMHG | HEART RATE: 87 BPM | SYSTOLIC BLOOD PRESSURE: 99 MMHG | RESPIRATION RATE: 15 BRPM | OXYGEN SATURATION: 95 %

## 2020-08-20 DIAGNOSIS — C01 SQUAMOUS CELL CARCINOMA OF BASE OF TONGUE (HCC): Primary | ICD-10-CM

## 2020-08-20 LAB
ANION GAP SERPL CALC-SCNC: 7 MMOL/L (ref 5–15)
BUN SERPL-MCNC: 20 MG/DL (ref 6–20)
BUN/CREAT SERPL: 27 (ref 12–20)
CALCIUM SERPL-MCNC: 9.3 MG/DL (ref 8.5–10.1)
CHLORIDE SERPL-SCNC: 102 MMOL/L (ref 97–108)
CO2 SERPL-SCNC: 28 MMOL/L (ref 21–32)
CREAT SERPL-MCNC: 0.73 MG/DL (ref 0.55–1.02)
GLUCOSE SERPL-MCNC: 144 MG/DL (ref 65–100)
POTASSIUM SERPL-SCNC: 4.2 MMOL/L (ref 3.5–5.1)
SODIUM SERPL-SCNC: 137 MMOL/L (ref 136–145)

## 2020-08-20 PROCEDURE — 80048 BASIC METABOLIC PNL TOTAL CA: CPT

## 2020-08-20 PROCEDURE — 96360 HYDRATION IV INFUSION INIT: CPT

## 2020-08-20 PROCEDURE — 74011250636 HC RX REV CODE- 250/636: Performed by: NURSE PRACTITIONER

## 2020-08-20 PROCEDURE — 99214 OFFICE O/P EST MOD 30 MIN: CPT | Performed by: INTERNAL MEDICINE

## 2020-08-20 PROCEDURE — 36415 COLL VENOUS BLD VENIPUNCTURE: CPT

## 2020-08-20 PROCEDURE — 77030016057 HC NDL HUBR APOL -B

## 2020-08-20 RX ADMIN — SODIUM CHLORIDE 1000 ML: 900 INJECTION, SOLUTION INTRAVENOUS at 09:36

## 2020-08-20 NOTE — PROGRESS NOTES
[facility] [place] at [place] [address]  [address] [facility], [address] [facility] 1007 [facility] Pal: [phone]  F: [phone]      Reason for Visit:   Brayden Garcia is a 52 y.o. female who is seen for follow up of oropharyngeal cancer. Treatment History:   · Biopsy of neck mass 3/25/2020: squamous cell carcinoma  · PET/CT 7/7/7865: Hypermetabolic right level 2 cervical lymph node likely corresponds to the biopsy proven squamous cell carcinoma. Focal increased tracer activity at the right tongue base/right vallecula is suspicious for primary malignancy. No evidence of distant metastatic disease. · Direct laryngoscopy by Dr. Magda Painting 4/13/2020: [place] tissue in right lateral tongue base without well-defined mass. Biopsies of tongue base with invasive squamous cell carcinoma, moderately differentiated, p16+. 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 Simone Sierra on 6/8/2020, completed 7/30/2020  · Initiated curative intent therapy with weekly Cisplatin on 6/9/2020, completed 7/30/2020    History of Present Illness:   Presents for follow up on therapy. Getting in 4 Boost daily via PEG. Can not do more than this due to feeling too full. No recent nausea or vomiting. No mouth sores. No rash. Struggling with taste changes. Doesn't feel that she can tolerate soft foods due to taste. Swallowing water without difficulty. Not significant pain. Just doesn't feel that she is able to eat foods due to taste. She is a smoker, working on cutting back. She is unaccompanied today. 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 She lives in [place] with her . PAST HISTORY: The following sections were reviewed and updated in the EMR as appropriate: PMH, SH, FH, Medications, Allergies.     Allergies   Allergen Reactions    Tylenol-Codeine #3 [Acetaminophen-Codeine] Nausea Only      Review of Systems: A complete review of systems was obtained, reviewed. Pertinent findings reviewed above. Physical Exam:     Visit Vitals  BP 99/58   Pulse 99   Temp 97.7 °F (36.5 °C)   Resp 18   Ht 5' 2\" (1.575 m)   Wt 120 lb 6.4 oz (54.6 kg)   SpO2 95%   BMI 22.02 kg/m²     ECOG PS: 0  General: No distress  Eyes: PERRLA, anicteric sclerae  HENT: Atraumatic, OP clear  Neck: Supple  Lymphatic: No cervical, supraclavicular, or inguinal adenopathy  Respiratory: CTAB, normal respiratory effort  CV: Normal rate, regular rhythm, no murmurs, no peripheral edema  GI: Soft, nontender, nondistended, no masses, no hepatomegaly, no splenomegaly  MS: Normal gait and station. Digits without clubbing or cyanosis. Skin: No rashes, ecchymoses, or petechiae. Normal temperature, turgor, and texture. Psych: Alert, oriented, appropriate affect, normal judgment/insight    Results:     Lab Results   Component Value Date/Time    WBC 5.1 07/29/2020 10:44 AM    HGB 10.3 (L) 07/29/2020 10:44 AM    HCT 29.9 (L) 07/29/2020 10:44 AM    PLATELET 732 77/04/8553 10:44 AM    MCV 92.6 07/29/2020 10:44 AM    ABS. NEUTROPHILS 4.1 07/29/2020 10:44 AM     Lab Results   Component Value Date/Time    Sodium 137 08/20/2020 09:36 AM    Potassium 4.2 08/20/2020 09:36 AM    Chloride 102 08/20/2020 09:36 AM    CO2 28 08/20/2020 09:36 AM    Glucose 144 (H) 08/20/2020 09:36 AM    BUN 20 08/20/2020 09:36 AM    Creatinine 0.73 08/20/2020 09:36 AM    GFR est AA >60 08/20/2020 09:36 AM    GFR est non-AA >60 08/20/2020 09:36 AM    Calcium 9.3 08/20/2020 09:36 AM     Lab Results   Component Value Date/Time    Bilirubin, total 0.2 07/29/2020 10:15 AM    ALT (SGPT) 25 07/29/2020 10:15 AM    Alk. phosphatase 90 07/29/2020 10:15 AM    Protein, total 6.8 07/29/2020 10:15 AM    Albumin 2.9 (L) 07/29/2020 10:15 AM    Globulin 3.9 07/29/2020 10:15 AM     Abd xray 7/9/2020:  1.  Interval development of a moderate left pleural effusion with left basilar  atelectasis. 2. Percutaneous gastrostomy tube. No complicating features noted. Assessment:   1) Squamous cell carcinoma of the right base of tongue  Stage I, p16+  She has biopsy proven disease in her base of tongue and right cervical node. Images personally reviewed with radiologist, node is <2cm. She has Stage I disease based on the assumption that her cancer is largely driven by HPV, which portends a more favorable prognosis. However, she is also a smoker and this could be driving her disease as well. In either case, recommendation is for definitive radiation with concurrent chemotherapy. She is currently receiving radiation with Dr. Ryan Francis, and we are treating her with weekly Cisplatin 40mg/m2. Tolerating therapy well with exception of grade 1 nausea, grade 1 dysguesia and loss of appetite. Added Dexamethasone home dosing with week 2 of therapy. Plt count has dropped to 58 with week 6 of therapy and treatment was delayed x 1 week. Now being followed with supportive care only, treatment completed. Weekly IV hydration for now. Planned PET on/about 10/22/2020.     2) Dysphagia  Minimal on swallow study. Seen by speech pathologist.  Jan Patel will follow. Has had PEG tube placed now for 100% of nutrition. Drinking water by mouth now only. Encouraged introduction of soft foods/thickened liquids. 3) CINV  Resolved. 4) Left pleural effusion  Uncertain etiology. Follow up on imaging. 5) Emotional Well Being  No psychosocial concerns identified today. Patient has adequate support. Social work support today for questions regarding care card an insurance changes. Plan:     · Weekly hydration  · [facility]  · Follow up here in 3 weeks    Patient was seen in conjunction with Katrin Ferrera NP.     Signed By: Farhana Barraza MD

## 2020-08-20 NOTE — PROGRESS NOTES
[facility] Progress Note    Date: 2020    Name: Citlalli Talley    MRN: [id number]         : 1970    Ms. Melisa Zacarias Arrived ambulatory and in no distress for Hydration Regimen. Assessment was completed, no acute issues at this time, no new complaints voiced. Right chest wall port accessed without difficulty, labs drawn & sent for processing. Ms. Ochoa Prior vitals were reviewed. Patient Vitals for the past 12 hrs:   Temp Pulse Resp BP SpO2   20 0928 97.7 °F (36.5 °C) 99 18 98/74 95 %     Lab were obtained and pending, please see connectcare. Medications:  1 L NS    Ms. Melisa Zacarias tolerated treatment well and was discharged from [place] [address] in stable condition. Port de-accessed, flushed & heparinized per protocol. She is to return on 20 for her next appointment.     Joao Rm RN  2020

## 2020-08-20 NOTE — PROGRESS NOTES
[address]  Social Work Navigator Encounter     Patient Name:  Kary Nguyen    Medical History: h&n cancer    Advance Directives: none on file, conversation deferred    Narrative: Met with patient who reports her health insurance coverage ended. She voiced concern as she is not able to afford COBRA at $750/month. Provided information about Medicaid. Patient will check with her  to determine if they meet income eligibility. Explained to patient she has 61 to enroll in COBRA. Patient voiced understanding. Barriers to Care: uninsured    Assessment/Action:  1. Provided information on COBRA, Medicaid and BS financial assistance for patient to review her options.      Plan/Referral:   Insurance/Entitlements referral  Financial/Medication assistance referral     Thank you,  Justo Vences LCSW

## 2020-08-20 NOTE — PROGRESS NOTES
Carlos Eduardo Root is a 52 y.o. female here for follow up of head and neck cancer. 1. Have you been to the ER, urgent care clinic since your last visit? Hospitalized since your last visit?: No.    2. Have you seen or consulted any other health care providers outside of the [address] [place] [address] since your last visit?   Include any pap smears or colon screening.: No.

## 2020-08-27 ENCOUNTER — HOSPITAL ENCOUNTER (OUTPATIENT)
Dept: INFUSION THERAPY | Age: 50
Discharge: HOME | End: 2020-08-27

## 2020-09-01 ENCOUNTER — HOSPITAL ENCOUNTER (OUTPATIENT)
Dept: RADIATION THERAPY | Age: 50
Discharge: HOME | End: 2020-09-01

## 2020-09-01 ENCOUNTER — HOSPITAL ENCOUNTER (OUTPATIENT)
Dept: INFUSION THERAPY | Age: 50
Discharge: HOME | End: 2020-09-01
Payer: COMMERCIAL

## 2020-09-01 VITALS
RESPIRATION RATE: 18 BRPM | TEMPERATURE: 98.2 F | DIASTOLIC BLOOD PRESSURE: 70 MMHG | HEART RATE: 78 BPM | SYSTOLIC BLOOD PRESSURE: 104 MMHG

## 2020-09-01 DIAGNOSIS — C01 SQUAMOUS CELL CARCINOMA OF BASE OF TONGUE (HCC): Primary | ICD-10-CM

## 2020-09-01 LAB
ANION GAP SERPL CALC-SCNC: 6 MMOL/L (ref 5–15)
BUN SERPL-MCNC: 23 MG/DL (ref 6–20)
BUN/CREAT SERPL: 35 (ref 12–20)
CALCIUM SERPL-MCNC: 9.2 MG/DL (ref 8.5–10.1)
CHLORIDE SERPL-SCNC: 101 MMOL/L (ref 97–108)
CO2 SERPL-SCNC: 30 MMOL/L (ref 21–32)
CREAT SERPL-MCNC: 0.66 MG/DL (ref 0.55–1.02)
GLUCOSE SERPL-MCNC: 108 MG/DL (ref 65–100)
POTASSIUM SERPL-SCNC: 4.1 MMOL/L (ref 3.5–5.1)
SODIUM SERPL-SCNC: 137 MMOL/L (ref 136–145)

## 2020-09-01 PROCEDURE — 80048 BASIC METABOLIC PNL TOTAL CA: CPT

## 2020-09-01 PROCEDURE — 77030012965 HC NDL HUBR BBMI -A

## 2020-09-01 PROCEDURE — 36415 COLL VENOUS BLD VENIPUNCTURE: CPT

## 2020-09-01 PROCEDURE — 74011250636 HC RX REV CODE- 250/636: Performed by: NURSE PRACTITIONER

## 2020-09-01 PROCEDURE — 96360 HYDRATION IV INFUSION INIT: CPT

## 2020-09-01 RX ADMIN — SODIUM CHLORIDE 1000 ML: 900 INJECTION, SOLUTION INTRAVENOUS at 11:05

## 2020-09-01 NOTE — PROGRESS NOTES
[facility] VISIT NOTE    Pt arrived at [facility] ambulatory and in no distress for Hydration/Labs. Assessment completed, pt had no complaints. Right chest port accessed with . 75  in colon no difficulty. Positive blood return noted and labs drawn. Medications Administered     sodium chloride 0.9 % bolus infusion 1,000 mL     Admin Date  09/01/2020 Action  New Bag Dose  1000 mL Rate  1,000 mL/hr Route  IntraVENous Administered By  José Miguel Pennington RN              Tolerated treatment well, no adverse reaction noted. Port de-accessed and flushed per protocol. Positive blood return noted. D/C'd from [facility] ambulatory and in no distress. Next appointment is 9/10/2 at 9:30.

## 2020-09-03 ENCOUNTER — APPOINTMENT (OUTPATIENT)
Dept: INFUSION THERAPY | Age: 50
End: 2020-09-03
Payer: COMMERCIAL

## 2020-09-03 ENCOUNTER — PATIENT MESSAGE (OUTPATIENT)
Dept: ONCOLOGY | Age: 50
End: 2020-09-03

## 2020-09-10 ENCOUNTER — OFFICE VISIT (OUTPATIENT)
Dept: ONCOLOGY | Age: 50
End: 2020-09-10
Payer: COMMERCIAL

## 2020-09-10 ENCOUNTER — TELEPHONE (OUTPATIENT)
Dept: ONCOLOGY | Age: 50
End: 2020-09-10

## 2020-09-10 ENCOUNTER — HOSPITAL ENCOUNTER (OUTPATIENT)
Dept: INFUSION THERAPY | Age: 50
Discharge: HOME | End: 2020-09-10
Payer: COMMERCIAL

## 2020-09-10 VITALS
HEART RATE: 90 BPM | SYSTOLIC BLOOD PRESSURE: 110 MMHG | DIASTOLIC BLOOD PRESSURE: 58 MMHG | RESPIRATION RATE: 18 BRPM | OXYGEN SATURATION: 98 %

## 2020-09-10 VITALS
RESPIRATION RATE: 16 BRPM | TEMPERATURE: 97.5 F | DIASTOLIC BLOOD PRESSURE: 70 MMHG | BODY MASS INDEX: 22.26 KG/M2 | HEART RATE: 84 BPM | HEIGHT: 62 IN | OXYGEN SATURATION: 96 % | SYSTOLIC BLOOD PRESSURE: 115 MMHG | WEIGHT: 121 LBS

## 2020-09-10 DIAGNOSIS — C01 SQUAMOUS CELL CARCINOMA OF BASE OF TONGUE (HCC): Primary | ICD-10-CM

## 2020-09-10 LAB
ANION GAP SERPL CALC-SCNC: 2 MMOL/L (ref 5–15)
BUN SERPL-MCNC: 24 MG/DL (ref 6–20)
BUN/CREAT SERPL: 34 (ref 12–20)
CALCIUM SERPL-MCNC: 9.3 MG/DL (ref 8.5–10.1)
CHLORIDE SERPL-SCNC: 103 MMOL/L (ref 97–108)
CO2 SERPL-SCNC: 32 MMOL/L (ref 21–32)
CREAT SERPL-MCNC: 0.71 MG/DL (ref 0.55–1.02)
GLUCOSE SERPL-MCNC: 155 MG/DL (ref 65–100)
POTASSIUM SERPL-SCNC: 3.8 MMOL/L (ref 3.5–5.1)
SODIUM SERPL-SCNC: 137 MMOL/L (ref 136–145)

## 2020-09-10 PROCEDURE — 96360 HYDRATION IV INFUSION INIT: CPT

## 2020-09-10 PROCEDURE — 99214 OFFICE O/P EST MOD 30 MIN: CPT | Performed by: INTERNAL MEDICINE

## 2020-09-10 PROCEDURE — 36415 COLL VENOUS BLD VENIPUNCTURE: CPT

## 2020-09-10 PROCEDURE — 74011250636 HC RX REV CODE- 250/636: Performed by: NURSE PRACTITIONER

## 2020-09-10 PROCEDURE — 77030016057 HC NDL HUBR APOL -B

## 2020-09-10 PROCEDURE — 80048 BASIC METABOLIC PNL TOTAL CA: CPT

## 2020-09-10 RX ADMIN — SODIUM CHLORIDE 1000 ML: 900 INJECTION, SOLUTION INTRAVENOUS at 11:15

## 2020-09-10 NOTE — PROGRESS NOTES
[facility] Progress Note    Date: September 10, 2020    Name: Belkis Durbin    MRN: [id number]         : 1970    Ms. Samreen Rivera Arrived ambulatory and in no distress for Hydration. Assessment was completed, no acute issues at this time, no new complaints voiced. Right chest wall port accessed without difficulty, labs drawn & sent for processing. Patient proceeded to appointment with Dr. Cathy Jose. Ms. Sofialeightonpratibha Lisa vitals were reviewed. Visit Vitals  /58 (BP 1 Location: Left arm, BP Patient Position: At rest)   Pulse 90   Resp 18   SpO2 98%       Labs pending in CC. Medications:  1 L Bolus     Ms. Samreen Rivera tolerated treatment well and was discharged from [place] [address] in stable condition. Port de-accessed, flushed & heparinized per protocol. She is to return on 2020 for her next appointment.     Norman Mims RN  September 10, 2020

## 2020-09-10 NOTE — TELEPHONE ENCOUNTER
[facility]  [phone]    09/10/20- Phone call placed to [facility] spoke to Johnathan Osorio. Advised her per Enma Plummer. NP she was recently seen by Dr. Mark Barton, but her PET scan has not been ordered. Will they order or do they want us to? Due 10/22. MJ will inform nurse.

## 2020-09-10 NOTE — PROGRESS NOTES
Cancer [place] at [address] 100[address] [facility]: [phone]  F: [phone]      Reason for Visit:   Glenny Valencia is a 52 y.o. female who is seen for follow up of oropharyngeal cancer. Treatment History:   · Biopsy of neck mass 3/25/2020: squamous cell carcinoma  · PET/CT 5/3/9424: Hypermetabolic right level 2 cervical lymph node likely corresponds to the biopsy proven squamous cell carcinoma. Focal increased tracer activity at the right tongue base/right vallecula is suspicious for primary malignancy. No evidence of distant metastatic disease. · Direct laryngoscopy by Dr. Owen Jones 4/13/2020: Karen Salgado tissue in right lateral tongue base without well-defined mass. Biopsies of tongue base with invasive squamous cell carcinoma, moderately differentiated, p16+. 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 Mahsa Kirk on 6/8/2020, completed 7/30/2020  · Initiated curative intent therapy with weekly Cisplatin on 6/9/2020, completed 7/30/2020    History of Present Illness:   Presents for follow up on therapy. Still struggling with taste changes and dry mouth. Eating an omelet, was able to eat half of it and was able to taste the green pepper. No trouble with swallowing. No pain. No coughing with eating. Continues PEG tube feedings 4 times daily. Flushing with water before and after each feeding. Drinking 1 bottle of water by mouth daily. She is a smoker, working on [employer] back. She is unaccompanied today. 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 She lives in [place] with her . PAST HISTORY: The following sections were reviewed and updated in the EMR as appropriate: PMH, SH, FH, Medications, Allergies.     Allergies   Allergen Reactions    Tylenol-Codeine #3 [Acetaminophen-Codeine] Nausea Only      Review of Systems: A complete review of systems was obtained, reviewed. Pertinent findings reviewed above. Physical Exam:     Visit Vitals  /70 (BP 1 Location: Right arm, BP Patient Position: Sitting)   Pulse 84   Temp 97.5 °F (36.4 °C) (Temporal)   Resp 16   Ht 5' 2\" (1.575 m)   Wt 121 lb (54.9 kg)   SpO2 96%   BMI 22.13 kg/m²     ECOG PS: 0  General: No distress  Eyes: PERRLA, anicteric sclerae  HENT: Atraumatic, OP clear  Neck: Supple  Lymphatic: No cervical, supraclavicular, or inguinal adenopathy  Respiratory: CTAB, normal respiratory effort  CV: Normal rate, regular rhythm, no murmurs, no peripheral edema  GI: Soft, nontender, nondistended, no masses, no hepatomegaly, no splenomegaly  MS: Normal gait and station. Digits without clubbing or cyanosis. Skin: No rashes, ecchymoses, or petechiae. Normal temperature, turgor, and texture. Psych: Alert, oriented, appropriate affect, normal judgment/insight    Results:     Lab Results   Component Value Date/Time    WBC 5.1 07/29/2020 10:44 AM    HGB 10.3 (L) 07/29/2020 10:44 AM    HCT 29.9 (L) 07/29/2020 10:44 AM    PLATELET 580 30/33/6379 10:44 AM    MCV 92.6 07/29/2020 10:44 AM    ABS. NEUTROPHILS 4.1 07/29/2020 10:44 AM     Lab Results   Component Value Date/Time    Sodium 137 09/01/2020 10:58 AM    Potassium 4.1 09/01/2020 10:58 AM    Chloride 101 09/01/2020 10:58 AM    CO2 30 09/01/2020 10:58 AM    Glucose 108 (H) 09/01/2020 10:58 AM    BUN 23 (H) 09/01/2020 10:58 AM    Creatinine 0.66 09/01/2020 10:58 AM    GFR est AA >60 09/01/2020 10:58 AM    GFR est non-AA >60 09/01/2020 10:58 AM    Calcium 9.2 09/01/2020 10:58 AM     Lab Results   Component Value Date/Time    Bilirubin, total 0.2 07/29/2020 10:15 AM    ALT (SGPT) 25 07/29/2020 10:15 AM    Alk.  phosphatase 90 07/29/2020 10:15 AM    Protein, total 6.8 07/29/2020 10:15 AM    Albumin 2.9 (L) 07/29/2020 10:15 AM    Globulin 3.9 07/29/2020 10:15 AM       Abd xray 7/9/2020:  1. Interval development of a moderate left pleural effusion with left basilar  atelectasis. 2. Percutaneous gastrostomy tube. No complicating features noted. Assessment:   1) Squamous cell carcinoma of the right base of tongue  Stage I, p16+  She has biopsy proven disease in her base of tongue and right cervical node. Images personally reviewed with radiologist, node is <2cm. She has Stage I disease based on the assumption that her cancer is largely driven by HPV, which portends a more favorable prognosis. However, she is also a smoker and this could be driving her disease as well. In either case, recommendation is for definitive radiation with concurrent chemotherapy. She has completed radiation with Dr. Shelley Vicente  And 7 weeks of weekly Cisplatin 40mg/m2. Now being followed with supportive care only, treatment completed. Weekly IV hydration through end of September. Planned PET on/about 10/22/2020.     2) Dysphagia  Minimal on swallow study. Seen by speech pathologist.  Deion Tapia will follow. Has had PEG tube placed now for 100% of nutrition. Encouraged introduction of soft foods/thickened liquids. Encouragement for increased PO nutrition with goal of removing PEG and 100% of nutrition by mouth after PET    3) Left pleural effusion  Uncertain etiology. Follow up on imaging. 4) Emotional Well Being  No psychosocial concerns identified today. Patient has adequate support. Plan:     · Weekly hydration through September  · [facility]  · PET 10/22/2020, to be ordered by [facility]  · Follow up here in 4 weeks    Patient was seen in conjunction with Fabby Martinez NP.     Signed By: Ji Augustine MD

## 2020-09-10 NOTE — PROGRESS NOTES
Ian Robles is a 52 y.o. female follow up for oropharyngeal cancer. 1. Have you been to the ER, urgent care clinic since your last visit? Hospitalized since your last visit?no     2. Have you seen or consulted any other health care providers outside of the [address] [place] [address] since your last visit? Include any pap smears or colon screening.  no

## 2020-09-10 NOTE — LETTER
NOTIFICATION RETURN TO WORK  
 
9/10/2020 10:48 AM 
 
Ms. Christopher Cohen [address] [facility] [address] To Whom It May Concern: 
 
Christopher Cohen is currently under the care of [address]. She has been undergoing therapy with concurrent chemotherapy and radiation. Due to treatment schedule and high risk of infection from immunocompromised state, during this period of pandemic, she was taken out of work on 6/8/2020. She was seen in office on 9/10/2020 for follow up. Due to toxicity of therapy and need for aggressive supportive care with nausea management, IV fluids and PEG tube feedings patient's medical leave has been extended until after post treatment PET scan is obtained on 10/22/2020. If there are questions or concerns please have the patient contact our office. Sincerely, Celina Vallejo NP

## 2020-09-15 ENCOUNTER — HOSPITAL ENCOUNTER (OUTPATIENT)
Dept: INFUSION THERAPY | Age: 50
End: 2020-09-15
Payer: COMMERCIAL

## 2020-09-22 ENCOUNTER — TELEPHONE (OUTPATIENT)
Dept: ONCOLOGY | Age: 50
End: 2020-09-22

## 2020-09-22 NOTE — TELEPHONE ENCOUNTER
Patient called back and LVM that she needed to change her infusion appt on the same day but either 9:30 or 10:30    Please call back# [phone]

## 2020-10-01 ENCOUNTER — HOSPITAL ENCOUNTER (OUTPATIENT)
Dept: INFUSION THERAPY | Age: 50
Discharge: HOME | End: 2020-10-01
Payer: COMMERCIAL

## 2020-10-01 VITALS
DIASTOLIC BLOOD PRESSURE: 67 MMHG | TEMPERATURE: 97.2 F | HEART RATE: 80 BPM | SYSTOLIC BLOOD PRESSURE: 101 MMHG | OXYGEN SATURATION: 95 % | WEIGHT: 124.9 LBS | RESPIRATION RATE: 18 BRPM | BODY MASS INDEX: 22.84 KG/M2

## 2020-10-01 DIAGNOSIS — C01 SQUAMOUS CELL CARCINOMA OF BASE OF TONGUE (HCC): Primary | ICD-10-CM

## 2020-10-01 LAB
ANION GAP SERPL CALC-SCNC: 4 MMOL/L (ref 5–15)
BUN SERPL-MCNC: 21 MG/DL (ref 6–20)
BUN/CREAT SERPL: 33 (ref 12–20)
CALCIUM SERPL-MCNC: 9.2 MG/DL (ref 8.5–10.1)
CHLORIDE SERPL-SCNC: 103 MMOL/L (ref 97–108)
CO2 SERPL-SCNC: 30 MMOL/L (ref 21–32)
CREAT SERPL-MCNC: 0.63 MG/DL (ref 0.55–1.02)
GLUCOSE SERPL-MCNC: 137 MG/DL (ref 65–100)
POTASSIUM SERPL-SCNC: 3.7 MMOL/L (ref 3.5–5.1)
SODIUM SERPL-SCNC: 137 MMOL/L (ref 136–145)

## 2020-10-01 PROCEDURE — 77030016057 HC NDL HUBR APOL -B

## 2020-10-01 PROCEDURE — 36415 COLL VENOUS BLD VENIPUNCTURE: CPT

## 2020-10-01 PROCEDURE — 80048 BASIC METABOLIC PNL TOTAL CA: CPT

## 2020-10-01 PROCEDURE — 74011250636 HC RX REV CODE- 250/636: Performed by: INTERNAL MEDICINE

## 2020-10-01 PROCEDURE — 96360 HYDRATION IV INFUSION INIT: CPT

## 2020-10-01 PROCEDURE — 74011250636 HC RX REV CODE- 250/636: Performed by: NURSE PRACTITIONER

## 2020-10-01 RX ORDER — SODIUM CHLORIDE 9 MG/ML
10 INJECTION INTRAMUSCULAR; INTRAVENOUS; SUBCUTANEOUS AS NEEDED
Status: DISCONTINUED | OUTPATIENT
Start: 2020-10-01 | End: 2020-10-02 | Stop reason: HOSPADM

## 2020-10-01 RX ORDER — SODIUM CHLORIDE 0.9 % (FLUSH) 0.9 %
5-10 SYRINGE (ML) INJECTION AS NEEDED
Status: DISCONTINUED | OUTPATIENT
Start: 2020-10-01 | End: 2020-10-02 | Stop reason: HOSPADM

## 2020-10-01 RX ORDER — HEPARIN 100 UNIT/ML
500 SYRINGE INTRAVENOUS AS NEEDED
Status: DISCONTINUED | OUTPATIENT
Start: 2020-10-01 | End: 2020-10-02 | Stop reason: HOSPADM

## 2020-10-01 RX ADMIN — SODIUM CHLORIDE 1000 ML: 900 INJECTION, SOLUTION INTRAVENOUS at 10:26

## 2020-10-01 RX ADMIN — HEPARIN 500 UNITS: 100 SYRINGE at 11:31

## 2020-10-01 RX ADMIN — SODIUM CHLORIDE 10 ML: 9 INJECTION INTRAMUSCULAR; INTRAVENOUS; SUBCUTANEOUS at 10:23

## 2020-10-01 RX ADMIN — Medication 10 ML: at 11:31

## 2020-10-01 RX ADMIN — Medication 10 ML: at 10:24

## 2020-10-01 NOTE — PROGRESS NOTES
[facility] Progress Note    3601 Pt admit to [facility] for Hydration ambulatory in stable condition. Assessment completed. No new concerns voiced. Right chest wall port accessed with positive blood return. Labs obtained and sent for processing. Results pending in CC. Visit Vitals  BP 94/70 (BP 1 Location: Left arm, BP Patient Position: Sitting)   Pulse 89   Temp 97.2 °F (36.2 °C)   Resp 18   Wt 56.7 kg (124 lb 14.4 oz)   SpO2 96%   Breastfeeding No   BMI 22.84 kg/m²     Patient Vitals for the past 12 hrs:   Temp Pulse Resp BP SpO2   10/01/20 1132  80 18 101/67 95 %   10/01/20 1018 97.2 °F (36.2 °C) 89 18 94/70 96 %       Medications:  Medications Administered     0.9% sodium chloride injection 10 mL     Admin Date  10/01/2020 Action  Given Dose  10 mL Route  IntraVENous Administered By  Zeinab Martinez RN          heparin (porcine) pf 500 Units     Admin Date  10/01/2020 Action  Given Dose  500 Units Route  IntraVENous Administered By  Zeinab Martinez RN          sodium chloride (NS) flush 5-10 mL     Admin Date  10/01/2020 Action  Given Dose  10 mL Route  IntraVENous Administered By  Zeinab Martinez RN           Admin Date  10/01/2020 Action  Given Dose  10 mL Route  IntraVENous Administered By  Zeinab Martinez RN          sodium chloride 0.9 % bolus infusion 1,000 mL     Admin Date  10/01/2020 Action  New Bag Dose  1000 mL Rate  1,000 mL/hr Route  IntraVENous Administered By  Zeinab Martinez RN                1137 Pt tolerated treatment well. Port flushed with positive blood return, heparinized, and colon needle de-accessed per protocol. D/c home ambulatory in no distress. Pt aware of next appointment scheduled for 10/8/2020.

## 2020-10-08 ENCOUNTER — OFFICE VISIT (OUTPATIENT)
Dept: ONCOLOGY | Age: 50
End: 2020-10-08
Payer: COMMERCIAL

## 2020-10-08 ENCOUNTER — HOSPITAL ENCOUNTER (OUTPATIENT)
Dept: INFUSION THERAPY | Age: 50
Discharge: HOME | End: 2020-10-08
Payer: COMMERCIAL

## 2020-10-08 VITALS
DIASTOLIC BLOOD PRESSURE: 60 MMHG | OXYGEN SATURATION: 98 % | SYSTOLIC BLOOD PRESSURE: 103 MMHG | RESPIRATION RATE: 18 BRPM | TEMPERATURE: 97.7 F | HEART RATE: 90 BPM

## 2020-10-08 VITALS — TEMPERATURE: 97.7 F | DIASTOLIC BLOOD PRESSURE: 60 MMHG | SYSTOLIC BLOOD PRESSURE: 103 MMHG

## 2020-10-08 DIAGNOSIS — C01 SQUAMOUS CELL CARCINOMA OF BASE OF TONGUE (HCC): Primary | ICD-10-CM

## 2020-10-08 LAB
ANION GAP SERPL CALC-SCNC: 4 MMOL/L (ref 5–15)
BUN SERPL-MCNC: 22 MG/DL (ref 6–20)
BUN/CREAT SERPL: 29 (ref 12–20)
CALCIUM SERPL-MCNC: 8.9 MG/DL (ref 8.5–10.1)
CHLORIDE SERPL-SCNC: 104 MMOL/L (ref 97–108)
CO2 SERPL-SCNC: 30 MMOL/L (ref 21–32)
CREAT SERPL-MCNC: 0.75 MG/DL (ref 0.55–1.02)
GLUCOSE SERPL-MCNC: 142 MG/DL (ref 65–100)
POTASSIUM SERPL-SCNC: 3.8 MMOL/L (ref 3.5–5.1)
SODIUM SERPL-SCNC: 138 MMOL/L (ref 136–145)

## 2020-10-08 PROCEDURE — 99214 OFFICE O/P EST MOD 30 MIN: CPT | Performed by: INTERNAL MEDICINE

## 2020-10-08 PROCEDURE — 96360 HYDRATION IV INFUSION INIT: CPT

## 2020-10-08 PROCEDURE — 77030016057 HC NDL HUBR APOL -B

## 2020-10-08 PROCEDURE — 74011250636 HC RX REV CODE- 250/636: Performed by: NURSE PRACTITIONER

## 2020-10-08 PROCEDURE — 80048 BASIC METABOLIC PNL TOTAL CA: CPT

## 2020-10-08 PROCEDURE — 36415 COLL VENOUS BLD VENIPUNCTURE: CPT

## 2020-10-08 RX ADMIN — SODIUM CHLORIDE 1000 ML: 900 INJECTION, SOLUTION INTRAVENOUS at 10:15

## 2020-10-08 NOTE — PROGRESS NOTES
[facility] [place] at [place] [address] [facility] [address] 100[address] Ah[address] Bedoya Cea: [phone]  F: [phone]      Reason for Visit:   Gabriel Loving is a 52 y.o. female who is seen for follow up of oropharyngeal cancer. Treatment History:   · Biopsy of neck mass 3/25/2020: squamous cell carcinoma  · PET/CT 6/3/2504: Hypermetabolic right level 2 cervical lymph node likely corresponds to the biopsy proven squamous cell carcinoma. Focal increased tracer activity at the right tongue base/right vallecula is suspicious for primary malignancy. No evidence of distant metastatic disease. · Direct laryngoscopy by Dr. Lukas Muniz 4/13/2020: Pelon Sires tissue in right lateral tongue base without well-defined mass. Biopsies of tongue base with invasive squamous cell carcinoma, moderately differentiated, p16+. 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 Cate Mcleod on 6/8/2020, completed 7/30/2020  · Initiated curative intent therapy with weekly Cisplatin on 6/9/2020, completed 7/30/2020    History of Present Illness:   Presents for follow up on therapy. Unable to take food in by mouth due to taste changes. States she will gag and vomit with eating eggs. Has tried yogurt and applesauce without any luck. She is drinking without issue, no problem with smoothies, water, sweet tea. Using PEG tube 4 times daily. No fever, chills, SOB or chest pain. No coughing with eating. She is a smoker, working on [employer] back. She is unaccompanied today. 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 She lives in [place] with her . PAST HISTORY: The following sections were reviewed and updated in the EMR as appropriate: PMH, SH, FH, Medications, Allergies.     Allergies   Allergen Reactions    Tylenol-Codeine #3 [Acetaminophen-Codeine] Nausea Only      Review of Systems: A complete review of systems was obtained, reviewed. Pertinent findings reviewed above. Physical Exam:     Visit Vitals  /60 (BP 1 Location: Right arm, BP Patient Position: Sitting)   Temp 97.7 °F (36.5 °C) (Temporal)     ECOG PS: 0  General: No distress  Eyes: PERRLA, anicteric sclerae  HENT: Atraumatic, OP clear  Neck: Supple  Lymphatic: No cervical, supraclavicular, or inguinal adenopathy  Respiratory: CTAB, normal respiratory effort  CV: Normal rate, regular rhythm, no murmurs, no peripheral edema  GI: Soft, nontender, nondistended, no masses, no hepatomegaly, no splenomegaly, PEG tube site looks good  MS: Normal gait and station. Digits without clubbing or cyanosis. Skin: No rashes, ecchymoses, or petechiae. Normal temperature, turgor, and texture. Psych: Alert, oriented, appropriate affect, normal judgment/insight    Results:     Lab Results   Component Value Date/Time    WBC 5.1 07/29/2020 10:44 AM    HGB 10.3 (L) 07/29/2020 10:44 AM    HCT 29.9 (L) 07/29/2020 10:44 AM    PLATELET 198 87/83/2094 10:44 AM    MCV 92.6 07/29/2020 10:44 AM    ABS. NEUTROPHILS 4.1 07/29/2020 10:44 AM     Lab Results   Component Value Date/Time    Sodium 138 10/08/2020 09:08 AM    Potassium 3.8 10/08/2020 09:08 AM    Chloride 104 10/08/2020 09:08 AM    CO2 30 10/08/2020 09:08 AM    Glucose 142 (H) 10/08/2020 09:08 AM    BUN 22 (H) 10/08/2020 09:08 AM    Creatinine 0.75 10/08/2020 09:08 AM    GFR est AA >60 10/08/2020 09:08 AM    GFR est non-AA >60 10/08/2020 09:08 AM    Calcium 8.9 10/08/2020 09:08 AM     Lab Results   Component Value Date/Time    Bilirubin, total 0.2 07/29/2020 10:15 AM    ALT (SGPT) 25 07/29/2020 10:15 AM    Alk. phosphatase 90 07/29/2020 10:15 AM    Protein, total 6.8 07/29/2020 10:15 AM    Albumin 2.9 (L) 07/29/2020 10:15 AM    Globulin 3.9 07/29/2020 10:15 AM       Abd xray 7/9/2020:  1.  Interval development of a moderate left pleural effusion with left basilar  atelectasis. 2. Percutaneous gastrostomy tube. No complicating features noted. Assessment:   1) Squamous cell carcinoma of the right base of tongue  Stage I, p16+  She has biopsy proven disease in her base of tongue and right cervical node. Images personally reviewed with radiologist, node is <2cm. She has Stage I disease based on the assumption that her cancer is largely driven by HPV, which portends a more favorable prognosis. However, she is also a smoker and this could be driving her disease as well. In either case, recommendation is for definitive radiation with concurrent chemotherapy. She has completed radiation with Dr. Kim Rodríguez  And 7 weeks of weekly Cisplatin 40mg/m2. Now being followed with supportive care only, treatment completed. Planned PET on 10/22/2020.     2) Dysphagia  Minimal on swallow study. Seen by speech pathologist.  Kathia Vaughn will follow. Has had PEG tube placed now for 100% of nutrition. Encouraged introduction of soft foods/thickened liquids. Encouragement for increased PO nutrition with goal of removing PEG and 100% of nutrition by mouth after PET. Will discuss with dietician whether it is recommended to have another swallow study. 3) Left pleural effusion  Uncertain etiology. Follow up on imaging. 4) Emotional Well Being  No psychosocial concerns identified today. Patient has adequate support. Plan:     · Follow up with [facility] in March  · PET 10/22/2020  · Follow up here in 3 weeks    Patient was seen in conjunction with Arnoldo Cueva NP.     Signed By: Chandan Read MD

## 2020-10-08 NOTE — PROGRESS NOTES
Missy Fowler is a 52 y.o. female follow up for oropharyngeal cancer. 1. Have you been to the ER, urgent care clinic since your last visit? Hospitalized since your last visit?no     2. Have you seen or consulted any other health care providers outside of the [address] [place] [address] since your last visit? Include any pap smears or colon screening.  No        Vitals 10/8/2020   Blood Pressure 103/60   Pulse 90   Temp 97.7   Resp 18   Height    Weight    SpO2 98   103/60

## 2020-10-08 NOTE — PROGRESS NOTES
[facility] Progress Note    Date: 2020    Name: Alicia Neves    MRN: [id number]         : 1970    Ms. Umang Rivas Arrived ambulatory and in no distress for Hydration. Assessment was completed, patient states she is having issues introducing foods that she can tolerate without nausea. Pt states she is still relying heavily on the feeding tube. Right chest wall port accessed without difficulty, labs drawn & sent for processing. Notified MD's office of patients complaints and suggested having a dietician speak with her. Patient proceeded to appointment with Dr. Abdiel Donato. Ms. Donta Ronquillo vitals were reviewed. Visit Vitals  /60   Pulse 90   Temp 97.7 °F (36.5 °C)   Resp 18   SpO2 98%       Labs pending in CC. Medications:  1 L Bolus     Ms. Umang Rivas tolerated treatment well and was discharged from [place] [address] in stable condition. Port de-accessed, flushed & heparinized per protocol. She is to follow up with her provider regarding ongoing care.      Bev Chambers RN  2020

## 2020-10-09 ENCOUNTER — TELEPHONE (OUTPATIENT)
Dept: ONCOLOGY | Age: 50
End: 2020-10-09

## 2020-10-09 NOTE — TELEPHONE ENCOUNTER
Cancer [place] at [address], [address] [place] [address] C[place]: [phone]  F: [phone]    Medical Nutrition Therapy  Nutrition Encounter:    Called patient to check in. No answer left message.      Signed By: Deanna Salgado, [address] [place] [address], [address] [facility]

## 2020-10-12 ENCOUNTER — TELEPHONE (OUTPATIENT)
Dept: ONCOLOGY | Age: 50
End: 2020-10-12

## 2020-10-12 NOTE — TELEPHONE ENCOUNTER
[facility] [place] at [place] [address]  [address] [facility], [address] [place] [id number]  W: [phone]  F: [phone]    Medical Nutrition Therapy  Nutrition Encounter:    2nd attempt. Called patient to check in. No answer left message.      Signed By: Venkat Crooks, [address] [place] [address], [address] [facility]

## 2020-10-19 ENCOUNTER — TELEPHONE (OUTPATIENT)
Dept: ONCOLOGY | Age: 50
End: 2020-10-19

## 2020-10-19 DIAGNOSIS — R13.10 DYSPHAGIA, UNSPECIFIED TYPE: Primary | ICD-10-CM

## 2020-10-19 NOTE — TELEPHONE ENCOUNTER
Cancer [place] at [facility]  [address] 100[address] [facility] [place]: [phone]  F: [phone]    Medical Nutrition Therapy      Reason for nutrition encounter:    Patient returned phone call. She continues to struggle to eat by mouth. She states when she swallows food she gags. It eventually goes down. She did spit out the hamburger steak as she gagged and it came back up. She has tried mashed potatoes, hamburger steak and eggs. She reports that food has not taste but she can taste sweet tea, gatorade and mountain dew. She can swallow liquids without difficulty. She is doing 4 Boost VHC per day via PEG. Flushes with 2 syringes of water before and after. This is providing her with 2120kcal, 88g protein and  1600ml free water (TF+Flushes). Results:   Diagnosis: Oropharyngeal cancer   Started treatment on 6/9/20. Completed concurrent chemoradiation on 7/30/20   Chemotherapy Flowsheet 7/30/2020   Cycle C1D43   Date 7/30/2020   Drug / Regimen Cisplatin   Pre Hydration -   Pre Meds -   Notes -       Wt Readings from Last 5 Encounters:   10/01/20 124 lb 14.4 oz (56.7 kg)   09/10/20 121 lb (54.9 kg)   08/20/20 120 lb 6.4 oz (54.6 kg)   07/30/20 121 lb 3.2 oz (55 kg)   07/29/20 119 lb (54 kg)     Estimated Nutrition Needs:   Calorie Range: 2200-2800kcal/day     Protein Range: 60-75g/day     Fluid Needs:  2000ml     Assessment:   Inadequate oral food or beverage intake  (potential) related to concurrent chemotherapy and radiation as evidence by treatment side effects. Plan:    - Continue with 4 cans of Boost VHC.  - Rinse mouth with baking soda and water before eating in effort to improve flavor of foods.  - Try to eat something twice daily. Suggested runny eggs, jello, yogurt, mashed potatoes. - MBS to assess post treatment swallow integrity. I appreciate the opportunity to participate in Ms. Urmila Dickinson's care.     Signed By: Abby Arce [address] [place] [address], [address]     Contact: [phone]

## 2020-10-20 ENCOUNTER — TELEPHONE (OUTPATIENT)
Dept: ONCOLOGY | Age: 50
End: 2020-10-20

## 2020-10-20 NOTE — TELEPHONE ENCOUNTER
Called patient to make her aware of MBS scheduled for 10/27/20 at 12:30pm at Outpatient Registration at [facility]. Left her a message with this information.

## 2020-10-22 ENCOUNTER — HOSPITAL ENCOUNTER (OUTPATIENT)
Dept: PET IMAGING | Age: 50
Discharge: HOME | End: 2020-10-22
Attending: RADIOLOGY
Payer: COMMERCIAL

## 2020-10-22 VITALS — BODY MASS INDEX: 22.15 KG/M2 | HEIGHT: 63 IN | WEIGHT: 125 LBS

## 2020-10-22 DIAGNOSIS — C01 MALIGNANT NEOPLASM OF BASE OF TONGUE (HCC): ICD-10-CM

## 2020-10-22 PROCEDURE — A9552 F18 FDG: HCPCS

## 2020-10-22 RX ORDER — SODIUM CHLORIDE 0.9 % (FLUSH) 0.9 %
10 SYRINGE (ML) INJECTION
Status: COMPLETED | OUTPATIENT
Start: 2020-10-22 | End: 2020-10-22

## 2020-10-22 RX ADMIN — Medication 10 ML: at 09:16

## 2020-10-27 ENCOUNTER — TELEPHONE (OUTPATIENT)
Dept: ONCOLOGY | Age: 50
End: 2020-10-27

## 2020-10-27 NOTE — TELEPHONE ENCOUNTER
[facility]cer [place] at [address] [facility] 100[address] [facility]  Ashly Bucco: [phone]  F: [phone]    Medical Nutrition Therapy      Nutrition encounter:    Patient called this morning to notify RD that she had to cancel her MBS this morning. She fell yesterday and twisted her ankle and can barely walk. Called central scheduling and rescheduled appointment for 11/3/20 at 1pm at outpatient registration. Needs to arrive at 12:30pm.    She reports she had chicken noodle soup and tolerated that. She tried tomato soup but it burned. She continues to gag on food but reports it may be more related to lack of taste than swallowing difficulty. She has not tried the previous recommendation of baking soda and water rinses before eating. Encouraged her to do so. She is doing 4 Boost VHC per day via PEG. Flushes with 2 syringes of water before and after. This is providing her with 2120kcal, 88g protein and  1600ml free water (TF+Flushes). Results:   Diagnosis: Oropharyngeal cancer   Started treatment on 6/9/20. Completed concurrent chemoradiation on 7/30/20   Chemotherapy Flowsheet 7/30/2020   Cycle C1D43   Date 7/30/2020   Drug / Regimen Cisplatin   Pre Hydration -   Pre Meds -   Notes -       Wt Readings from Last 5 Encounters:   10/22/20 125 lb (56.7 kg)   10/01/20 124 lb 14.4 oz (56.7 kg)   09/10/20 121 lb (54.9 kg)   08/20/20 120 lb 6.4 oz (54.6 kg)   07/30/20 121 lb 3.2 oz (55 kg)     Estimated Nutrition Needs:   Calorie Range: 2200-2800kcal/day     Protein Range: 60-75g/day     Fluid Needs:  2000ml     Assessment:   Inadequate oral food or beverage intake  (potential) related to concurrent chemotherapy and radiation as evidence by treatment side effects. Plan:    - Continue with 4 cans of Boost VHC.  - Rinse mouth with baking soda and water before eating in effort to improve flavor of foods.  - Try to eat something twice daily.   Suggested runny eggs, joceline, yogurt, mashed potatoes. - MBS to assess post treatment swallow integrity. I appreciate the opportunity to participate in Ms. Urmila Dickinson's care.     Signed By: Payton Archuleta, [address] [place] [address], [id number]     Contact: [phone]

## 2020-11-04 ENCOUNTER — TELEPHONE (OUTPATIENT)
Dept: ONCOLOGY | Age: 50
End: 2020-11-04

## 2020-11-05 ENCOUNTER — OFFICE VISIT (OUTPATIENT)
Dept: ONCOLOGY | Age: 50
End: 2020-11-05
Payer: COMMERCIAL

## 2020-11-05 VITALS
BODY MASS INDEX: 22.86 KG/M2 | HEART RATE: 96 BPM | RESPIRATION RATE: 20 BRPM | HEIGHT: 63 IN | DIASTOLIC BLOOD PRESSURE: 67 MMHG | WEIGHT: 129 LBS | TEMPERATURE: 98.5 F | OXYGEN SATURATION: 98 % | SYSTOLIC BLOOD PRESSURE: 108 MMHG

## 2020-11-05 DIAGNOSIS — C01 SQUAMOUS CELL CARCINOMA OF BASE OF TONGUE (HCC): Primary | ICD-10-CM

## 2020-11-05 PROCEDURE — 99214 OFFICE O/P EST MOD 30 MIN: CPT | Performed by: INTERNAL MEDICINE

## 2020-11-05 NOTE — LETTER
NOTIFICATION RETURN TO WORK  
 
11/5/2020 2:08 PM 
 
Ms. Gurjit Pichardo [address] [facility] [address] To Whom It May Concern: 
 
Gurjit Pichardo is currently under the care of Senia W Guerrero Haile. She has been undergoing therapy with concurrent chemotherapy and radiation. Due to treatment schedule and high risk of infection from immunocompromised state, during this period of pandemic, she was taken out of work on 6/8/2020. She was seen in office on 11/5/2020 for follow up. Due to toxicity of therapy and continued need for PEG tube feedings patient's medical leave has been extended until after a swallow study is obtained and patient is able to advance diet. She will be seen in office again on 12/10/2020 to assess further. If there are questions or concerns please have the patient contact our office. Sincerely, Milvia Chance NP

## 2020-11-05 NOTE — PROGRESS NOTES
Cancer [place] at [place] [address]  [address] [facility], 2329 [facility] 1007 [facility]  Berkley Canard: [phone]  F: [phone]      Reason for Visit:   Claude Bame is a 52 y.o. female who is seen for follow up of oropharyngeal cancer. Treatment History:   · Biopsy of neck mass 3/25/2020: squamous cell carcinoma  · PET/CT 9/1/5462: Hypermetabolic right level 2 cervical lymph node likely corresponds to the biopsy proven squamous cell carcinoma. Focal increased tracer activity at the right tongue base/right vallecula is suspicious for primary malignancy. No evidence of distant metastatic disease. · Direct laryngoscopy by Dr. Giovanni Starr 4/13/2020: Veryl [place] tissue in right lateral tongue base without well-defined mass. Biopsies of tongue base with invasive squamous cell carcinoma, moderately differentiated, p16+. 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 Letty Hill on 6/8/2020, completed 7/30/2020  · Initiated curative intent therapy with weekly Cisplatin on 6/9/2020, completed 7/30/2020    History of Present Illness:   Presents for follow up on therapy. Not able to get anything down solid food wise. She tried potatoes and hamburger helper. , but having some trouble swallowing. Tomatoes seem to burn through still. Feels that mouth is more dry lately. Drinking fluids without issue. She is a smoker, working on cutting back. She is unaccompanied today. 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 She lives in [place] with her . PAST HISTORY: The following sections were reviewed and updated in the EMR as appropriate: PMH, SH, FH, Medications, Allergies. Allergies   Allergen Reactions    Tylenol-Codeine #3 [Acetaminophen-Codeine] Nausea Only      Review of Systems: A complete review of systems was obtained, reviewed.   Pertinent findings reviewed above. Physical Exam:     Visit Vitals  /67 (BP 1 Location: Left arm, BP Patient Position: Sitting)   Pulse 96   Temp 98.5 °F (36.9 °C) (Temporal)   Resp 20   Ht 5' 3\" (1.6 m)   Wt 129 lb (58.5 kg)   SpO2 98%   BMI 22.85 kg/m²     ECOG PS: 0  General: No distress  Eyes: PERRLA, anicteric sclerae  HENT: Atraumatic, OP clear  Neck: Supple  Lymphatic: No cervical, supraclavicular, or inguinal adenopathy  Respiratory: CTAB, normal respiratory effort  CV: Normal rate, regular rhythm, no murmurs, no peripheral edema  GI: Soft, nontender, nondistended, no masses, no hepatomegaly, no splenomegaly, PEG tube site looks good  MS: Normal gait and station. Digits without clubbing or cyanosis. Skin: No rashes, ecchymoses, or petechiae. Normal temperature, turgor, and texture. Psych: Alert, oriented, appropriate affect, normal judgment/insight    Results:     Lab Results   Component Value Date/Time    WBC 5.1 07/29/2020 10:44 AM    HGB 10.3 (L) 07/29/2020 10:44 AM    HCT 29.9 (L) 07/29/2020 10:44 AM    PLATELET 178 36/09/5012 10:44 AM    MCV 92.6 07/29/2020 10:44 AM    ABS. NEUTROPHILS 4.1 07/29/2020 10:44 AM     Lab Results   Component Value Date/Time    Sodium 138 10/08/2020 09:08 AM    Potassium 3.8 10/08/2020 09:08 AM    Chloride 104 10/08/2020 09:08 AM    CO2 30 10/08/2020 09:08 AM    Glucose 142 (H) 10/08/2020 09:08 AM    BUN 22 (H) 10/08/2020 09:08 AM    Creatinine 0.75 10/08/2020 09:08 AM    GFR est AA >60 10/08/2020 09:08 AM    GFR est non-AA >60 10/08/2020 09:08 AM    Calcium 8.9 10/08/2020 09:08 AM     Lab Results   Component Value Date/Time    Bilirubin, total 0.2 07/29/2020 10:15 AM    ALT (SGPT) 25 07/29/2020 10:15 AM    Alk. phosphatase 90 07/29/2020 10:15 AM    Protein, total 6.8 07/29/2020 10:15 AM    Albumin 2.9 (L) 07/29/2020 10:15 AM    Globulin 3.9 07/29/2020 10:15 AM       Abd xray 7/9/2020:  1.  Interval development of a moderate left pleural effusion with left basilar  atelectasis. 2. Percutaneous gastrostomy tube. No complicating features noted. PET 10/22/2020:   No Foci of Abnormal Hypermetabolism. Assessment:   1) Squamous cell carcinoma of the right base of tongue  Stage I, p16+  She has biopsy proven disease in her base of tongue and right cervical node. Images personally reviewed with radiologist, node is <2cm. She has Stage I disease based on the assumption that her cancer is largely driven by HPV, which portends a more favorable prognosis. However, she is also a smoker and this could be driving her disease as well. In either case, recommendation is for definitive radiation with concurrent chemotherapy. She has completed radiation with Dr. Clara Obrien and 7 weeks of weekly Cisplatin 40mg/m2. Now being followed with supportive care only, treatment completed. PET since last visit is clear. She will now move to surveillance. She will see Dr. Jonny Mireles in Jan and Dr. Clara Obrien in March. Surveillance after H&N cancer, per NCCN guidelines, is as follows:  · Clinical evaluation 4-8 weeks post-treatment. If clinically looking good, obtain restaging PET at 12 weeks. · Year 1: H&P every 1-3mo  · Year 2: H&P every 2-6 mo  · Year 3-5: H&P every 4-8 mo  · Year 6+: H&P every 12 mo  · TSH every 6-12 mo  · Annual low-dose helical CT scanning of the lung for select patient  · Imaging otherwise only as indicated  · Smoking cessation, etoh counseling, speech/hearing/swallowing eval as indicated  · Dental f/u    2) Dysphagia  Minimal on swallow study. Seen by speech pathologist.   Has had PEG tube placed now for 100% of nutrition. Encouraged introduction of soft foods/thickened liquids. Encouragement for increased PO nutrition with goal of removing PEG and 100% of nutrition by mouth. She is not there yet. Has scheduled MBS on Monday, 11/9/2020. 3) Left pleural effusion  Uncertain etiology. Follow up on imaging.      4) Emotional Well Being  No psychosocial concerns identified today. Patient has adequate support. She is now on assistance with care card application. She is on disability, will extend this out a bit to get through her swallow study. 5) Port  Offered removal. She will think about this and decide next visit. Plan:     · Follow up with Radonc in March  · Swallow study on Monday  · Advance diet as tolerated  · Consider port removal  · Follow up here in 6 weeks    I have personally seen and evaluated the patient in conjunction with Donal Peña NP. I find the patient's history and physical exam are consistent with the NP's documentation, and I agree with her assessment and plan.         Signed By: Christiano Frankel MD

## 2020-11-05 NOTE — TELEPHONE ENCOUNTER
[address] Cathleen Stahl at [facility] [address]  [phone]        11/05/20 8:23 AM Attempted to place return call to patient via contact number. No answer, left message advising that patient keep appointment as scheduled per Dr. Jose Francisco Norman. Provided office phone number if any questions.

## 2020-11-05 NOTE — PROGRESS NOTES
Florance Screen is a 52 y.o. female follow up for oropharyngeal cancer. 1. Have you been to the ER, urgent care clinic since your last visit? Hospitalized since your last visit?no    2. Have you seen or consulted any other health care providers outside of the [address] [place] [address] since your last visit? Include any pap smears or colon screening.  no

## 2020-11-09 ENCOUNTER — HOSPITAL ENCOUNTER (OUTPATIENT)
Dept: GENERAL RADIOLOGY | Age: 50
Discharge: HOME | End: 2020-11-09
Attending: NURSE PRACTITIONER
Payer: COMMERCIAL

## 2020-11-09 DIAGNOSIS — R13.10 DYSPHAGIA, UNSPECIFIED TYPE: ICD-10-CM

## 2020-11-09 PROCEDURE — 92611 MOTION FLUOROSCOPY/SWALLOW: CPT

## 2020-11-09 PROCEDURE — 74230 X-RAY XM SWLNG FUNCJ C+: CPT

## 2020-11-09 NOTE — PROGRESS NOTES
[facility]  [facility], [address] STUDY  Patient: Colleen Honeycutt (9 y.o. female)  Date: 2020  Referring Provider:  Reba Martino    SUBJECTIVE:   Patient c/o solid dysphagia. She has never stopped drinking liquids during chemo and radiation for lingual CA (-Aug 2020). She has a PEG still for nutrition. She started to try solid foods after chemo and radiation in sept, but they continue to make her gag and give her sensation of pharyngeal residue. No concerns for pills. OBJECTIVE:   Past Medical History:   Past Medical History:   Diagnosis Date    Cancer ([facility] [address]     rt side neck lymph node    Fainting spell      Past Surgical History:   Procedure Laterality Date    ABDOMEN SURGERY PROC UNLISTED      HX  SECTION      IR INSERT TUNL CVC W PORT OVER 5 YEARS  2020     Current Dietary Status:  Liquid only. Radiologist: Elio Sequeira  Film Views: Lateral  Patient Position: upright standing    Trial 1: Trial 2:   Consistency Presented: Thin liquid;Pudding;Pill/Tablet; Solid     How Presented: Self-fed/presented;Cup/gulp; Spoon      ORAL PHASE:      Bolus Acceptance: No impairment     Bolus Formation/Control: No impairment:    :     Propulsion: No impairment     Oral Residue: None   PHARYNGEAL PHASE:      Initiation of Swallow: No impairment     Timing: Other (comment)     Penetration: None     Aspiration/Timing: No evidence of aspiration     Pharyngeal Clearance: No residue                  ESOPHAGEAL PHASE:   Noted slow transit of solids and pill in mid esophagus. She continued to gag and c/o discomfort in throat.               Trial 3: Trial 4:                            :    :                                                                           Laryngeal Elevation: WFL (within functional limits)  Aspiration/Penetration Score: 1 (No penetration or aspiration-Contrast does not enter the airway)  Pharyngeal Symmetry: Not assessed  Pharyngeal-Esophageal Segment: Suspected esophageal dysphagia               ASSESSMENT :  Based on the objective data described above, the patient presents with normal oral-pharyngeal swallow. She may have referred sensation for reduced peristalsis in mid to lower esophagus with solid and pill residue. Collette Ruts PLAN/RECOMMENDATIONS :  Ok for diet as tolerated. Recommend ENT to scope her pharynx for any irritation from radiation/chemo. If none noted, consider GI workup for irritation in esophagus. COMMUNICATION/EDUCATION:   The above findings and recommendations were discussed with: patient who verbalized understanding.     Thank you for this referral.  Leticia Felton, SLP  Time Calculation: 25 mins

## 2020-11-24 ENCOUNTER — HOSPITAL ENCOUNTER (OUTPATIENT)
Dept: PREADMISSION TESTING | Age: 50
Discharge: HOME | End: 2020-11-24

## 2020-11-24 VITALS
HEIGHT: 63 IN | OXYGEN SATURATION: 97 % | DIASTOLIC BLOOD PRESSURE: 73 MMHG | WEIGHT: 123.7 LBS | HEART RATE: 82 BPM | RESPIRATION RATE: 16 BRPM | SYSTOLIC BLOOD PRESSURE: 116 MMHG | BODY MASS INDEX: 21.92 KG/M2 | TEMPERATURE: 98.3 F

## 2020-11-24 RX ORDER — GLUCOSAMINE SULFATE 1500 MG
2000 POWDER IN PACKET (EA) ORAL DAILY
COMMUNITY
End: 2021-05-28 | Stop reason: ALTCHOICE

## 2020-11-24 NOTE — PROGRESS NOTES
Note from surgeon in chart dated 11/18/20 with heart and lung assessment.  This satisfies the requirements for H/P.

## 2020-11-24 NOTE — PERIOP NOTES
[address] [address] [facility]   PRE-ADMISSION TESTING    [phone]   Surgery Date:  Thursday, 12/3/2020      Is surgery arrival time given by surgeon? NO  If NO, [address] [facility] staff will call you between 3 and 7pm the day before your surgery with your arrival time. (If your surgery is on a Monday, we will call you the Friday before.)    Call [phone] after 7pm Monday-Friday if you did not receive this call. INSTRUCTIONS BEFORE YOUR SURGERY   When You  Arrive Arrive at the 2nd [address] [facility] on the day of your surgery  Have your insurance card, photo ID, and any copayment (if needed)   Food   and   Drink NO food or drink after midnight the night before surgery    This means NO water, gum, mints, coffee, juice, etc.  No alcohol (beer, wine, liquor) 24 hours before and after surgery   Medications to   TAKE   Morning of Surgery MEDICATIONS TO TAKE THE MORNING OF SURGERY WITH A SIP OF WATER:    Celexa   Medications  To  STOP      7 days before surgery  Non-Steroidal anti-inflammatory Drugs (NSAID's): for example, Ibuprofen (Advil, Motrin), Naproxen (Aleve)   Aspirin, if taking for pain    Herbal supplements, vitamins, and fish oil   Other:  (Pain medications not listed above, including Tylenol may be taken)   Blood  Thinners  If you take  Aspirin, Plavix, Coumadin, or any blood-thinning or anti-blood clot medicine, talk to the doctor who prescribed the medications for pre-operative instructions. Bathing Clothing  Jewelry  Valuables      If you shower the morning of surgery, please do not apply anything to your skin (lotions, powders, deodorant, or makeup, especially mascara)   Follow Chlorhexidine Care Fusion body wash instructions provided to you during PAT appointment. Begin 3 days prior to surgery.    Do not shave or trim anywhere 24 hours before surgery   Wear your hair loose or down; no pony-tails, buns, or metal hair clips   Wear loose, comfortable, clean clothes   Wear glasses instead of contacts  [address], valuables, and jewelry, including body piercings, at home   Going Home - or Spending the Night  SAME-DAY SURGERY: You must have a responsible adult drive you home and stay with you 24 hours after surgery   ADMITS: If your doctor is keeping you in the hospital after surgery, leave personal belongings/luggage in your car until you have a hospital room number. Hospital discharge time is 12 noon  Drivers must be here before 12 noon unless you are told differently   Special Instructions It is now mandated that all surgical patients be tested for COVID-19 prior to surgery. Testing has to be exactly 4 days prior to surgery. Your COVID test date is Sunday, 11/29 between 8:00 am and 11:00 am.     COVID testing will be performed curbside at the [facility]. There will be signs leading you to the testing site. You will need to bring a photo ID with you to be swabbed. Patients are advised to self-quarantine at home after testing and prior to your surgery date. You will be notified if your results are positive. What to watch for:   Coronavirus (COVID-19) affects different people in different ways   It also appears with a wide range of symptoms from mild to severe   Signs usually appear 2-14 days after exposure     If you develop any of the following, notify your doctor immediately:  o Fever  o Chills, with or without a shiver  o Muscle pain  o Headache  o Sore throat  o Dry cough  o New loss of taste or smell  o Tiredness      If you develop any of the following, call 911:  o Shortness of breath  o Difficulty breathing  o Chest pain  o New confusion  o Blueness of fingers and/or lips     Follow all instructions so your surgery wont be cancelled. Please, be on time. If a situation occurs and you are delayed the day of surgery, call  [phone].     If your physical condition changes (like a fever, cold, flu, etc.) call your surgeon. Home medication(s) reviewed and verified verbally during PAT appointment. The patient was contacted in person. The patient verbalizes understanding of all instructions and does not need reinforcement.

## 2020-11-29 ENCOUNTER — HOSPITAL ENCOUNTER (OUTPATIENT)
Dept: PREADMISSION TESTING | Age: 50
Discharge: HOME | End: 2020-11-29
Payer: COMMERCIAL

## 2020-11-29 PROCEDURE — 87635 SARS-COV-2 COVID-19 AMP PRB: CPT

## 2020-11-30 LAB — SARS-COV-2, COV2NT: NOT DETECTED

## 2020-12-02 ENCOUNTER — ANESTHESIA EVENT (OUTPATIENT)
Dept: SURGERY | Age: 50
End: 2020-12-02
Payer: COMMERCIAL

## 2020-12-02 ENCOUNTER — TELEPHONE (OUTPATIENT)
Dept: ONCOLOGY | Age: 50
End: 2020-12-02

## 2020-12-02 DIAGNOSIS — C01 SQUAMOUS CELL CARCINOMA OF BASE OF TONGUE (HCC): Primary | ICD-10-CM

## 2020-12-02 NOTE — TELEPHONE ENCOUNTER
[facility] [place] at [facility]  [phone]        12/02/20 4:03 PM Called patient and advised that order had been placed for port removal. Explained that  department will call patient directly but asked that patient contact this office if she does not receive a call within a couple days. She verbalized understanding. No further questions or concerns at this time.

## 2020-12-02 NOTE — TELEPHONE ENCOUNTER
Patient called and wanted to know if you guys could make appt for her to get her port out.      # [phone]

## 2020-12-03 ENCOUNTER — ANESTHESIA (OUTPATIENT)
Dept: SURGERY | Age: 50
End: 2020-12-03
Payer: COMMERCIAL

## 2020-12-03 ENCOUNTER — HOSPITAL ENCOUNTER (OUTPATIENT)
Age: 50
Setting detail: OUTPATIENT SURGERY
Discharge: HOME | End: 2020-12-03
Attending: SPECIALIST | Admitting: SPECIALIST
Payer: COMMERCIAL

## 2020-12-03 VITALS
OXYGEN SATURATION: 97 % | TEMPERATURE: 97.8 F | HEART RATE: 71 BPM | RESPIRATION RATE: 12 BRPM | DIASTOLIC BLOOD PRESSURE: 74 MMHG | BODY MASS INDEX: 21.56 KG/M2 | WEIGHT: 121.69 LBS | HEIGHT: 63 IN | SYSTOLIC BLOOD PRESSURE: 111 MMHG

## 2020-12-03 PROBLEM — R13.10 DYSPHAGIA: Status: ACTIVE | Noted: 2020-12-03

## 2020-12-03 LAB — HCG UR QL: NEGATIVE

## 2020-12-03 PROCEDURE — 74011000250 HC RX REV CODE- 250: Performed by: NURSE ANESTHETIST, CERTIFIED REGISTERED

## 2020-12-03 PROCEDURE — 76210000040 HC AMBSU PH I REC FIRST 0.5 HR: Performed by: SPECIALIST

## 2020-12-03 PROCEDURE — 76210000046 HC AMBSU PH II REC FIRST 0.5 HR: Performed by: SPECIALIST

## 2020-12-03 PROCEDURE — 74011250636 HC RX REV CODE- 250/636: Performed by: STUDENT IN AN ORGANIZED HEALTH CARE EDUCATION/TRAINING PROGRAM

## 2020-12-03 PROCEDURE — 76060000061 HC AMB SURG ANES 0.5 TO 1 HR: Performed by: SPECIALIST

## 2020-12-03 PROCEDURE — 77030008684 HC TU ET CUF COVD -B: Performed by: STUDENT IN AN ORGANIZED HEALTH CARE EDUCATION/TRAINING PROGRAM

## 2020-12-03 PROCEDURE — 81025 URINE PREGNANCY TEST: CPT

## 2020-12-03 PROCEDURE — 77030040361 HC SLV COMPR DVT MDII -B

## 2020-12-03 PROCEDURE — 77030026438 HC STYL ET INTUB CARD -A: Performed by: STUDENT IN AN ORGANIZED HEALTH CARE EDUCATION/TRAINING PROGRAM

## 2020-12-03 PROCEDURE — 74011250636 HC RX REV CODE- 250/636: Performed by: NURSE ANESTHETIST, CERTIFIED REGISTERED

## 2020-12-03 PROCEDURE — 77030018846 HC SOL IRR STRL H20 ICUM -A: Performed by: SPECIALIST

## 2020-12-03 PROCEDURE — 2709999900 HC NON-CHARGEABLE SUPPLY: Performed by: SPECIALIST

## 2020-12-03 PROCEDURE — 77030040922 HC BLNKT HYPOTHRM STRY -A

## 2020-12-03 PROCEDURE — 76030000000 HC AMB SURG OR TIME 0.5 TO 1: Performed by: SPECIALIST

## 2020-12-03 RX ORDER — FENTANYL CITRATE 50 UG/ML
INJECTION, SOLUTION INTRAMUSCULAR; INTRAVENOUS AS NEEDED
Status: DISCONTINUED | OUTPATIENT
Start: 2020-12-03 | End: 2020-12-03 | Stop reason: HOSPADM

## 2020-12-03 RX ORDER — EPHEDRINE SULFATE/0.9% NACL/PF 50 MG/5 ML
SYRINGE (ML) INTRAVENOUS AS NEEDED
Status: DISCONTINUED | OUTPATIENT
Start: 2020-12-03 | End: 2020-12-03 | Stop reason: HOSPADM

## 2020-12-03 RX ORDER — ONDANSETRON 2 MG/ML
4 INJECTION INTRAMUSCULAR; INTRAVENOUS AS NEEDED
Status: DISCONTINUED | OUTPATIENT
Start: 2020-12-03 | End: 2020-12-03 | Stop reason: HOSPADM

## 2020-12-03 RX ORDER — HYDROMORPHONE HYDROCHLORIDE 1 MG/ML
.5-1 INJECTION, SOLUTION INTRAMUSCULAR; INTRAVENOUS; SUBCUTANEOUS
Status: DISCONTINUED | OUTPATIENT
Start: 2020-12-03 | End: 2020-12-03 | Stop reason: HOSPADM

## 2020-12-03 RX ORDER — LIDOCAINE HYDROCHLORIDE 10 MG/ML
0.1 INJECTION, SOLUTION EPIDURAL; INFILTRATION; INTRACAUDAL; PERINEURAL AS NEEDED
Status: DISCONTINUED | OUTPATIENT
Start: 2020-12-03 | End: 2020-12-03 | Stop reason: HOSPADM

## 2020-12-03 RX ORDER — ROCURONIUM BROMIDE 10 MG/ML
INJECTION, SOLUTION INTRAVENOUS AS NEEDED
Status: DISCONTINUED | OUTPATIENT
Start: 2020-12-03 | End: 2020-12-03 | Stop reason: HOSPADM

## 2020-12-03 RX ORDER — PROPOFOL 10 MG/ML
INJECTION, EMULSION INTRAVENOUS AS NEEDED
Status: DISCONTINUED | OUTPATIENT
Start: 2020-12-03 | End: 2020-12-03 | Stop reason: HOSPADM

## 2020-12-03 RX ORDER — SUCCINYLCHOLINE CHLORIDE 20 MG/ML
INJECTION INTRAMUSCULAR; INTRAVENOUS AS NEEDED
Status: DISCONTINUED | OUTPATIENT
Start: 2020-12-03 | End: 2020-12-03 | Stop reason: HOSPADM

## 2020-12-03 RX ORDER — ONDANSETRON 2 MG/ML
INJECTION INTRAMUSCULAR; INTRAVENOUS AS NEEDED
Status: DISCONTINUED | OUTPATIENT
Start: 2020-12-03 | End: 2020-12-03 | Stop reason: HOSPADM

## 2020-12-03 RX ORDER — SODIUM CHLORIDE, SODIUM LACTATE, POTASSIUM CHLORIDE, CALCIUM CHLORIDE 600; 310; 30; 20 MG/100ML; MG/100ML; MG/100ML; MG/100ML
125 INJECTION, SOLUTION INTRAVENOUS CONTINUOUS
Status: DISCONTINUED | OUTPATIENT
Start: 2020-12-03 | End: 2020-12-03 | Stop reason: HOSPADM

## 2020-12-03 RX ORDER — DEXAMETHASONE SODIUM PHOSPHATE 4 MG/ML
INJECTION, SOLUTION INTRA-ARTICULAR; INTRALESIONAL; INTRAMUSCULAR; INTRAVENOUS; SOFT TISSUE AS NEEDED
Status: DISCONTINUED | OUTPATIENT
Start: 2020-12-03 | End: 2020-12-03 | Stop reason: HOSPADM

## 2020-12-03 RX ORDER — LIDOCAINE HYDROCHLORIDE 20 MG/ML
INJECTION, SOLUTION EPIDURAL; INFILTRATION; INTRACAUDAL; PERINEURAL AS NEEDED
Status: DISCONTINUED | OUTPATIENT
Start: 2020-12-03 | End: 2020-12-03 | Stop reason: HOSPADM

## 2020-12-03 RX ORDER — MIDAZOLAM HYDROCHLORIDE 1 MG/ML
INJECTION, SOLUTION INTRAMUSCULAR; INTRAVENOUS AS NEEDED
Status: DISCONTINUED | OUTPATIENT
Start: 2020-12-03 | End: 2020-12-03 | Stop reason: HOSPADM

## 2020-12-03 RX ORDER — NEOSTIGMINE METHYLSULFATE 1 MG/ML
INJECTION, SOLUTION INTRAVENOUS AS NEEDED
Status: DISCONTINUED | OUTPATIENT
Start: 2020-12-03 | End: 2020-12-03 | Stop reason: HOSPADM

## 2020-12-03 RX ORDER — GLYCOPYRROLATE 0.2 MG/ML
INJECTION INTRAMUSCULAR; INTRAVENOUS AS NEEDED
Status: DISCONTINUED | OUTPATIENT
Start: 2020-12-03 | End: 2020-12-03 | Stop reason: HOSPADM

## 2020-12-03 RX ADMIN — ONDANSETRON HYDROCHLORIDE 4 MG: 2 SOLUTION INTRAMUSCULAR; INTRAVENOUS at 07:40

## 2020-12-03 RX ADMIN — SUCCINYLCHOLINE CHLORIDE 100 MG: 20 INJECTION, SOLUTION INTRAMUSCULAR; INTRAVENOUS; PARENTERAL at 07:33

## 2020-12-03 RX ADMIN — FENTANYL CITRATE 100 MCG: 0.05 INJECTION, SOLUTION INTRAMUSCULAR; INTRAVENOUS at 07:33

## 2020-12-03 RX ADMIN — FENTANYL CITRATE 50 MCG: 0.05 INJECTION, SOLUTION INTRAMUSCULAR; INTRAVENOUS at 07:44

## 2020-12-03 RX ADMIN — Medication 10 MG: at 07:39

## 2020-12-03 RX ADMIN — DEXAMETHASONE SODIUM PHOSPHATE 8 MG: 4 INJECTION, SOLUTION INTRAMUSCULAR; INTRAVENOUS at 07:40

## 2020-12-03 RX ADMIN — ROCURONIUM BROMIDE 20 MG: 10 INJECTION INTRAVENOUS at 07:33

## 2020-12-03 RX ADMIN — LIDOCAINE HYDROCHLORIDE 80 MG: 20 INJECTION, SOLUTION INTRAVENOUS at 07:33

## 2020-12-03 RX ADMIN — Medication 3 MG: at 08:10

## 2020-12-03 RX ADMIN — ROCURONIUM BROMIDE 10 MG: 10 INJECTION INTRAVENOUS at 07:48

## 2020-12-03 RX ADMIN — FENTANYL CITRATE 50 MCG: 0.05 INJECTION, SOLUTION INTRAMUSCULAR; INTRAVENOUS at 07:48

## 2020-12-03 RX ADMIN — MIDAZOLAM HYDROCHLORIDE 4 MG: 2 INJECTION, SOLUTION INTRAMUSCULAR; INTRAVENOUS at 07:25

## 2020-12-03 RX ADMIN — PROPOFOL 200 MG: 10 INJECTION, EMULSION INTRAVENOUS at 07:33

## 2020-12-03 RX ADMIN — GLYCOPYRROLATE 0.4 MG: 0.2 INJECTION INTRAMUSCULAR; INTRAVENOUS at 08:10

## 2020-12-03 RX ADMIN — SODIUM CHLORIDE, POTASSIUM CHLORIDE, SODIUM LACTATE AND CALCIUM CHLORIDE: 600; 310; 30; 20 INJECTION, SOLUTION INTRAVENOUS at 07:25

## 2020-12-03 NOTE — BRIEF OP NOTE
Brief Postoperative Note    Patient: Yusef Powell  YOB: 1970  MRN: [id number]    Date of Procedure: 12/3/2020     Pre-Op Diagnosis: DYSPHAGIA AND NECK MALIGNANCY    Post-Op Diagnosis: Same as preoperative diagnosis. Procedure(s):  DIRECT LARYNGOSCOPY, ESOPHAGOSCOPY WITH DILATION    Surgeon(s):  Adenike De Leon MD    Surgical Assistant: None    Anesthesia: General     Estimated Blood Loss (mL): Minimal    Complications: None    Specimens: * No specimens in log *     Implants: * No implants in log *    Drains: * No LDAs found *    Findings: Slight narrowing of cervical esophagus. Bougie dilators used from 42 to 56 fr without complication.      Electronically Signed by Jak Godinez MD on 12/3/2020 at 8:25 AM

## 2020-12-03 NOTE — ANESTHESIA PREPROCEDURE EVALUATION
Relevant Problems   No relevant active problems       Anesthetic History               Review of Systems / Medical History  Patient summary reviewed and pertinent labs reviewed    Pulmonary          Smoker    Pertinent negatives: No COPD and asthma     Neuro/Psych         Psychiatric history     Cardiovascular  Within defined limits                Exercise tolerance: >4 METS     GI/Hepatic/Renal  Within defined limits              Endo/Other        Cancer (head/neck cancer )     Other Findings              Physical Exam    Airway  Mallampati: II  TM Distance: 4 - 6 cm  Neck ROM: normal range of motion   Mouth opening: Normal     Cardiovascular    Rhythm: regular  Rate: normal         Dental    Dentition: Lower dentition intact and Upper dentition intact  Comments: Chipped front teeth   Pulmonary  Breath sounds clear to auscultation               Abdominal         Other Findings            Anesthetic Plan    ASA: 2  Anesthesia type: general          Induction: Intravenous  Anesthetic plan and risks discussed with: Patient

## 2020-12-03 NOTE — DISCHARGE INSTRUCTIONS
Endoscopy Postoperative Instructions      Most patient have very little pain or discomfort from this procedure unless biopsies were taken. IIf this is the case, usually one starts feeling better by day 4 or 5. Below are some important tips to make the recovery as smooth as possible. Never hesitate to call your physician's office if any questions or concerns arise. 1. Drink plenty of fluids. The most important requirement for the patient is to drink plenty of fluids. Milk products should generally be avoided for the first 24 hrs following anesthesia as they may upset the stomach. Water, juice, or Gatorade and soft drinks are generally well tolerated. Avoid citric drinks such as orange or grapefruit juice as they may irritate the throat and increase discomfort. Signs of dehydration include decreased urination (less than 2-3 times per day), and/or dry mouth. Please contact your physician if any concerns arise. 2. Eat soothing foods. REMAIN ON SOFT DIET FOR 3 DAYS, THEN SLOWLY ADVANCE DIET TO NORMAL AS TOLERATED. 3. Fever. A low-grade fever is not uncommon for several days following a throat surgery. Call your physician if a fever greater than 102 degrees is present over several hours. 4. Bleeding. Small specks of blood or blood-tinged saliva may be seen for several days following the procedure. 5. Pain. Pain is often mild to moderate for a few days following surgery and is often dependent of the extent of surgery. Expect throat pain to resolve about 4-6 days following surgery. The pain may also be present in the ears, jaw, tongue and neck. Prescription pain medication may be prescribed. If the pain is mild to moderate, acetaminophen (Tylenol) or ibuprofen (Advil, Motrin) may be used instead of the prescription pain medication (if prescribed). Some mild discomfort or pain upon yawning may be seen for several weeks following the procedure.   In many cases, aspirin should be avoided for about 5 days after the procedure. 6. Activity. Rest is recommended for the first few days, followed by slowly increased activity. If diet has resumed to near-normal and pain medication is no longer necessary, the patient may return to work or school. Depending on surgery, you may be asked to adhere to voice rest for several days. Discuss this with your surgeon. 7. Gargle with warm salt water. Gargling several times a day with warm salt water may be helpful. Mix 8 ounces of warm water with about 1/4 teaspoon of table salt. 8. Use a humidifier. Adding moisture to the air can be helpful. Keeping the mucous membranes moist can help ease the discomfort following throat surgery. A cool mist humidifier strategically placed near the bedside is ideal.    9. Stay away from irritants. Try to avoid potential irritants such as cigarette smoke, fumes from paints or other chemicals which may not only increase the pain but also delay healing. 10. Miscellaneous. The tongue or uvula may be swollen, red or \"bumpy\" for several days. Also, the voice may be different for a few weeks. Snoring may actually be worse for a week or two until the swelling goes down. Finally, there is often a mild to moderate taste disturbance for a few weeks following the procedure. This generally improves and long-term taste problems are very rare. 11. Follow up. PLEASE CALL DR. Uzair Goldberg IN 8 DAYS TO REPORT ON SWALLOWING FUNCTION. A FOLLOW UP VISIT WILL BE ARRANGED FOR ABOUT 3 MONTHS. If any problems or concerns arise before that time, please contact the office at [phone].     DISCHARGE SUMMARY from your Nurse      PATIENT INSTRUCTIONS    After general anesthesia or intravenous sedation, for 24 hours or while taking prescription Narcotics:  · Limit your activities  · Do not drive and operate hazardous machinery  · Do not make important personal or business decisions  · Do  not drink alcoholic beverages  · If you have not urinated within 8 hours after discharge, please contact your surgeon on call. Report the following to your surgeon:  · Excessive pain, swelling, redness or odor of or around the surgical area  · Temperature over 100.5  · Nausea and vomiting lasting longer than 4 hours or if unable to take medications  · Any signs of decreased circulation or nerve impairment to extremity: change in color, persistent  numbness, tingling, coldness or increase pain  · Any questions      GOOD HELP TO FIGHT AN INFECTION  Here are a few tip to help reduce the chance of getting an infection after surgery:   Wash Your Hands   Good handwashing is the most important thing you and your caregiver can do.  Wash before and after caring for any wounds. Dry your hand with a clean towel.  Wash with soap and water for at least 20 seconds. A TIP: sing the \"Happy Birthday\" song through one time while washing to help with the timing.  Use a hand  in between washings.  Shower   When your surgeon says it is OK to take a shower, use a new bar of antibacterial soap (if that is what you use, and keep that bar of soap ONLY for your use), or antibacterial body wash.  Use a clean wash cloth or sponge when you bathe.  Dry off with a clean towel  after every bath - be careful around any wounds, skin staples, sutures or surgical glue over/on wounds.  Do not enter swimming pools, hot tubs, lakes, rivers and/or ocean until wounds are healed and your doctor/surgeon says it is OK.  Use Clean Sheets   Sleep on freshly laundered sheets after your surgery.  Keep the surgery site covered with a clean, dry bandage (if instructed to do so). If the bandage becomes soiled, reapply a new, dry, clean bandage.  Do not allow pets to sleep with you while your wound is healing.  Lifestyle Modification and Controlling Your Blood Sugar   Smoking slows wound healing.   Stop smoking and limit exposure to second-hand smoke.  High blood sugar slows wound healing. Eat a well-balanced diet to provide proper nutrition while healing   Monitor your blood sugar (if you are a diabetic) and take your medications as you are suppose to so you can control you blood sugar after surgery. COUGH AND DEEP BREATHE    Breathing deeply and coughing are very important exercises to do after surgery. Deep breathing and coughing open the little air tubes and air sacks in your lungs. You take deep breaths every day. You may not even notice - it is just something you do when you sigh or yawn. It is a natural exercise you do to keep these air passages open. After surgery, take deep breaths and cough, on purpose. DIRECTIONS:  · Take 10 to 15 slow deep breaths every hour while awake. · Breathe in deeply, and hold it for 2 seconds. · Exhale slowly through puckered lips, like blowing up a balloon. · After every 4th or 5th deep breath, hug your pillow to your chest or belly and give a hard, deep cough. Yes, it will probably hurt. But doing this exercise is a very important part of healing after surgery. Take your pain medicine to help you do this exercise without too much pain. Coughing and deep breathing help prevent bronchitis and pneumonia after surgery. If you had chest or belly surgery, use a pillow as a \"hug stephenie\" and hold it tightly to your chest or belly when you cough. ANKLE PUMPS    Ankle pumps increase the circulation of oxygenated blood to your lower extremities and decrease your risk for circulation problems such as blood clots. They also stretch the muscles, tendons and ligaments in your foot and ankle, and prevent joint contracture in the ankle and foot, especially after surgeries on the legs. It is important to do ankle pump exercises regularly after surgery because immobility increases your risk for developing a blood clot.   Your doctor may also have you take an Aspirin for the next few days as well. If your doctor did not ask you to take an Aspirin, consult with him before starting Aspirin therapy on your own. The exercise is quite simple. · Slowly point your foot forward, feeling the muscles on the top of your lower leg stretch, and hold this position for 5 seconds. · Next, pull your foot back toward you as far as possible, stretching the calf muscles, and hold that position for 5 seconds. · Repeat with the other foot. · Perform 10 repetitions every hour while awake for both ankles if possible (down and then up with the foot once is one repetition). You should feel gentle stretching of the muscles in your lower leg when doing this exercise. If you feel pain, or your range of motion is limited, don't push too hard. Only go the limit your joint and muscles will let you go. If you have increasing pain, progressively worsening leg warmth or swelling, STOP the exercise and call your doctor. These are general instructions for a healthy lifestyle:    *   Please give a list of your current medications to your Primary Care Provider. *   Please update this list whenever your medications are discontinued, doses are changed, or new medications (including over-the-counter products) are added. *   Please carry medication information at all times in case of emergency situations. About Smoking  No smoking / No tobacco products  Avoid exposure to second hand smoke     Surgeon General's Warning:  Quitting smoking now greatly reduces serious risk to your health. Obesity, smoking, and sedentary lifestyle greatly increases your risk for illness and disease. A healthy diet, regular physical exercise & weight monitoring are important for maintaining a healthy lifestyle.       Congestive Heart Failure  You may be retaining fluid if you have a history of heart failure or if you experience any of the following symptoms:  Weight gain of 3 pounds or more overnight or 5 pounds in a week, increased swelling in your hands or feet or shortness of breath while lying flat in bed. Please call your doctor as soon as you notice any of these symptoms; do not wait until your next office visit. Recognize signs and symptoms of STROKE:  F -  Face looks uneven  A -  Arms unable to move or move evenly  S -  Speech slurred or non-existent  T -  Time-call 911 as soon as signs and symptoms begin-DO NOT go          back to bed or wait to see if you get better-TIME IS BRAIN. Warning Signs of HEART ATTACK   Call 911 if you have these symptoms:     Chest discomfort. Most heart attacks involve discomfort in the center of the chest that lasts more than a few minutes, or that goes away and comes back. It can feel like uncomfortable pressure, squeezing, fullness, or pain.  Discomfort in other areas of the upper body. Symptoms can include pain or discomfort in one or both arms, the back, neck, jaw, or stomach.  Shortness of breath with or without chest discomfort.  Other signs may include breaking out in a cold sweat, nausea, or lightheadedness. Don't wait more than five minutes to call 911 - MINUTES MATTER! Fast action can save your life. Calling 911 is almost always the fastest way to get lifesaving treatment. Emergency Medical Services staff can begin treatment when they arrive -- up to an hour sooner than if someone gets to the hospital by car. Learning About Coronavirus [phone])  Coronavirus [phone]): Overview  What is coronavirus (COVID-19)? The coronavirus disease (COVID-19) is caused by a virus. It is an illness that was first found in [place], [place], in December 2019. It has since spread worldwide. The virus can cause fever, cough, and trouble breathing. In severe cases, it can cause pneumonia and make it hard to breathe without help. It can cause death. Coronaviruses are a large group of viruses. They cause the common cold.  They also cause more serious illnesses like Middle East respiratory syndrome (MERS) and severe acute respiratory syndrome (SARS). COVID-19 is caused by a novel coronavirus. That means it's a new type that has not been seen in people before. This virus spreads person-to-person through droplets from coughing and sneezing. It can also spread when you are close to someone who is infected. And it can spread when you touch something that has the virus on it, such as a doorknob or a tabletop. What can you do to protect yourself from coronavirus (COVID-19)? The best way to protect yourself from getting sick is to:  · Avoid areas where there is an outbreak. · Avoid contact with people who may be infected. · Wash your hands often with soap or alcohol-based hand sanitizers. · Avoid crowds and try to stay at least 6 feet away from other people. · Wash your hands often, especially after you cough or sneeze. Use soap and water, and scrub for at least 20 seconds. If soap and water aren't available, use an alcohol-based hand . · Avoid touching your mouth, nose, and eyes. What can you do to avoid spreading the virus to others? To help avoid spreading the virus to others:  · Cover your mouth with a tissue when you cough or sneeze. Then throw the tissue in the trash. · Use a disinfectant to clean things that you touch often. · Stay home if you are sick or have been exposed to the virus. Don't go to school, work, or public areas. And don't use public transportation. · If you are sick:  ? Leave your home only if you need to get medical care. But call the doctor's office first so they know you're coming. And wear a face mask, if you have one.  ? If you have a face mask, wear it whenever you're around other people. It can help stop the spread of the virus when you cough or sneeze. ? Clean and disinfect your home every day. Use household  and disinfectant wipes or sprays.  Take special care to clean things that you grab with your hands. These include doorknobs, remote controls, phones, and handles on your refrigerator and microwave. And don't forget countertops, tabletops, bathrooms, and computer keyboards. When to call for help  Call 911 anytime you think you may need emergency care. For example, call if:  · You have severe trouble breathing. (You can't talk at all.)  · You have constant chest pain or pressure. · You are severely dizzy or lightheaded. · You are confused or can't think clearly. · Your face and lips have a blue color. · You pass out (lose consciousness) or are very hard to wake up. Call your doctor now if you develop symptoms such as:  · Shortness of breath. · Fever. · Cough. If you need to get care, call ahead to the doctor's office for instructions before you go. Make sure you wear a face mask, if you have one, to prevent exposing other people to the virus. Where can you get the latest information? The following health organizations are tracking and studying this virus. Their websites contain the most up-to-date information. Kyle Gianluca also learn what to do if you think you may have been exposed to the virus. · U.S. Centers for Disease Control and Prevention (CDC): The CDC provides updated news about the disease and travel advice. The website also tells you how to prevent the spread of infection. www.cdc.gov  · World Health Organization [facility]): WHO offers information about the virus outbreaks. WHO also has travel advice. www.who.int  Current as of: April 1, 2020               Content Version: 12.4  © 2006-2020 [employer]. Care instructions adapted under license by your healthcare professional. If you have questions about a medical condition or this instruction, always ask your healthcare professional. [address] any warranty or liability for your use of this information. The discharge information has been reviewed with the spouse.     Any questions and concerns from the spouse have been addressed. The spouse verbalized understanding. The following personal items collected during your admission are returned to you:   Dental Appliance:    Vision:    Hearing Aid:    Jewelry: Jewelry: None  Clothing: Clothing: Footwear, Pants, Shirt, Undergarments  Other Valuables:  Other Valuables: Eyeglasses  Valuables sent to safe:    [place] [address]   T: [phone]   [address] [place] [address]   F: [phone]  [email or web address]

## 2020-12-03 NOTE — OP NOTES
[facility] 81  OPERATIVE REPORT    Name:  Jonathan House  MR#:  [id number]  :  1970  ACCOUNT #:  [de-identified]  DATE OF SERVICE:  2020    PREOPERATIVE DIAGNOSES:  History of oropharyngeal squamous cell carcinoma, dysphagia. POSTOPERATIVE DIAGNOSES:  History of oropharyngeal squamous cell carcinoma, dysphagia. PROCEDURES PERFORMED:  1. Direct laryngoscopy. 2.  Diagnostic rigid esophagoscopy with bougie dilation. SURGEON:  Meena Correia MD    ASSISTANT:  None. ANESTHESIA:  General endotracheal.    COMPLICATIONS:  None. SPECIMENS REMOVED:  None. .    IMPLANTS:  None. ESTIMATED BLOOD LOSS:  Zero. DRAINS:  None. INDICATIONS FOR SURGERY:  The patient is a 53-year-old female with a history of the lateral tongue base squamous cell carcinoma. She completed radiation therapy and chemotherapy in 2020. She has had persistent dysphagia over the last several months. She has no evidence of persistent oropharyngeal squamous cell carcinoma. A decision was made to proceed to the operating room for laryngoscopy, esophagoscopy, and possible esophageal dilation. OPERATIVE FINDINGS:  The oropharynx was clear. There were minimal postradiation changes to the oropharyngeal mucosa. There were no masses or ulcerations. The larynx and hypopharynx were normal.  The cervical esophagus had normal mucosa but it appeared to be slightly narrowed but there was no focal stricture or other abnormality. An esophageal bougie dilators were used, starting with 42-Burundian and slowly advancing up to 56-Burundian. There was a fair amount of resistance at the 56-Burundian size and further dilation was not carried out. There was no blood on the bougie dilators. There was no evidence of esophageal tear or rupture. No biopsies were obtained. PROCEDURE:  The patient was met in the preoperative area where the procedure was discussed in detail. An operative permit was obtained.   She was then transferred to the operating room where she was placed in a supine position on the operating table. General anesthesia was commenced and she was orotracheally intubated without difficulty. The table was rotated 90 degrees and she was positioned in the standard fashion for laryngoscopy and esophagoscopy. Drapes were placed. A surgical time-out was then performed. A tooth guard was placed to protect the upper dentition. A rigid Dedo laryngoscope was used to carefully inspect the oropharynx, larynx, and hypopharynx. The findings were as aforementioned. There were no abnormalities and no biopsies were obtained. The laryngoscope was then fully removed. A cervical esophagoscope was then used. It was advanced transorally and into the oropharynx, hypopharynx, and eventually into the cervical esophagus. There appeared to be some slight narrowing of the cervical esophagus but there was no kristy stricture or other abnormality. No mucosal lesions were seen. The esophagoscope was then removed. No biopsies were obtained. The laryngoscope was brought back into the field and the hypopharynx was visualized. A bougie dilators, starting at 42-Bulgarian, were slowly advanced into the cervical esophagus. They were advanced to a distance of about 40 cm and then slowly withdrawn. The dilators were advanced to size 56-Bulgarian. There was no blood on the dilators. There was a moderate amount of resistance that was met at 56-Bulgarian. A 57-Bulgarian dilator was attempted to be passed but too much resistance was met and the dilator was not advanced completely into the cervical esophagus. The tongue base was palpated and felt to be soft. The neck was palpated and it was soft. There was no evidence of mass. There was no crepitus. The tooth guard was removed. All instrumentation was also removed from the mouth and the oropharynx. The drapes were removed. The table was returned to its original position.   She was awakened and extubated without event. She was safely transferred to the Postanesthesia Care Unit. There were no intraoperative complications.       Eriberto Phelps MD      [id number]  D:  12/03/2020 8:34  T:  12/03/2020 17:32  JOB #:  [id number]

## 2020-12-03 NOTE — ANESTHESIA POSTPROCEDURE EVALUATION
Procedure(s):  DIRECT LARYNGOSCOPY, ESOPHAGOSCOPY WITH DILATION.     general    Anesthesia Post Evaluation      Multimodal analgesia: multimodal analgesia used between 6 hours prior to anesthesia start to PACU discharge  Patient location during evaluation: PACU  Patient participation: complete - patient participated  Level of consciousness: awake and alert  Pain management: adequate  Airway patency: patent  Anesthetic complications: no  Cardiovascular status: acceptable  Respiratory status: acceptable  Hydration status: acceptable  Post anesthesia nausea and vomiting:  none      INITIAL Post-op Vital signs:   Vitals Value Taken Time   /74 12/3/2020  8:55 AM   Temp 36.6 °C (97.8 °F) 12/3/2020  8:55 AM   Pulse 71 12/3/2020  8:55 AM   Resp 12 12/3/2020  8:55 AM   SpO2 97 % 12/3/2020  8:55 AM

## 2020-12-03 NOTE — H&P
Date of Surgery Update:  Missy Fowler was seen and examined. History and physical has been reviewed. The patient has been examined.  There have been no significant clinical changes since the completion of the originally dated History and Physical.    Signed By: Norma Tong MD     December 3, 2020 7:18 AM

## 2020-12-07 NOTE — PROGRESS NOTES
Cancer [place] at [address], [address] [place] [address]  Rosario Van Meter: [phone]  F: [phone]      Reason for Visit:   Domonique Burk is a 52 y.o. female who is seen for follow up of oropharyngeal cancer. Treatment History:   · Biopsy of neck mass 3/25/2020: squamous cell carcinoma  · PET/CT 8/6/0121: Hypermetabolic right level 2 cervical lymph node likely corresponds to the biopsy proven squamous cell carcinoma. Focal increased tracer activity at the right tongue base/right vallecula is suspicious for primary malignancy. No evidence of distant metastatic disease. · Direct laryngoscopy by Dr. Joan Byrnes 4/13/2020: Keke Bent tissue in right lateral tongue base without well-defined mass. Biopsies of tongue base with invasive squamous cell carcinoma, moderately differentiated, p16+. 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 Phoebe Scott on 6/8/2020, completed 7/30/2020  · Initiated curative intent therapy with weekly Cisplatin on 6/9/2020, completed 7/30/2020    History of Present Illness:   She had a laryngoscopy in the OR with Dr. Joan Byrnes, with dilation of the cervical esophagus. Did well with procedure, but hasn't been able to eat much solid food since then. Eating banana pudding. Tried vegetable soup, but had trouble swallowing meat and potatoes. Drinking Ensure now-4 a day. No longer taking anything in via PEG. She is a smoker, working on cutting back. She is unaccompanied today. 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 She lives in [place] with her . PAST HISTORY: The following sections were reviewed and updated in the EMR as appropriate: PMH, SH, FH, Medications, Allergies.     Allergies   Allergen Reactions    Tylenol-Codeine #3 [Acetaminophen-Codeine] Itching      Review of Systems: A complete review of systems was obtained, reviewed. Pertinent findings reviewed above. Physical Exam:     Visit Vitals  /78 (BP 1 Location: Left arm, BP Patient Position: Sitting)   Pulse (!) 57   Temp (!) 96.4 °F (35.8 °C) (Temporal)   Resp 16   Ht 5' 3\" (1.6 m)   Wt 119 lb 3.2 oz (54.1 kg)   SpO2 97%   BMI 21.12 kg/m²     ECOG PS: 0  General: No distress  Respiratory: Normal respiratory effort  CV: No peripheral edema  Skin: No rashes, ecchymoses, or petechiae  Psych: Alert, oriented, normal mood/affect    Results:     Lab Results   Component Value Date/Time    WBC 5.1 07/29/2020 10:44 AM    HGB 10.3 (L) 07/29/2020 10:44 AM    HCT 29.9 (L) 07/29/2020 10:44 AM    PLATELET 878 78/50/1944 10:44 AM    MCV 92.6 07/29/2020 10:44 AM    ABS. NEUTROPHILS 4.1 07/29/2020 10:44 AM     Lab Results   Component Value Date/Time    Sodium 138 10/08/2020 09:08 AM    Potassium 3.8 10/08/2020 09:08 AM    Chloride 104 10/08/2020 09:08 AM    CO2 30 10/08/2020 09:08 AM    Glucose 142 (H) 10/08/2020 09:08 AM    BUN 22 (H) 10/08/2020 09:08 AM    Creatinine 0.75 10/08/2020 09:08 AM    GFR est AA >60 10/08/2020 09:08 AM    GFR est non-AA >60 10/08/2020 09:08 AM    Calcium 8.9 10/08/2020 09:08 AM     Lab Results   Component Value Date/Time    Bilirubin, total 0.2 07/29/2020 10:15 AM    ALT (SGPT) 25 07/29/2020 10:15 AM    Alk. phosphatase 90 07/29/2020 10:15 AM    Protein, total 6.8 07/29/2020 10:15 AM    Albumin 2.9 (L) 07/29/2020 10:15 AM    Globulin 3.9 07/29/2020 10:15 AM       Abd xray 7/9/2020:  1. Interval development of a moderate left pleural effusion with left basilar  atelectasis. 2. Percutaneous gastrostomy tube. No complicating features noted. PET 10/22/2020:   No Foci of Abnormal Hypermetabolism. Assessment:   1) Squamous cell carcinoma of the right base of tongue  Stage I, p16+  She has biopsy proven disease in her base of tongue and right cervical node.   Images personally reviewed with radiologist, node is <2cm. She has Stage I disease based on the assumption that her cancer is largely driven by HPV, which portends a more favorable prognosis. However, she is also a smoker and this could be driving her disease as well. She completed chemoradiation with weekly cisplatin in 7/2020, and she is now followed with surveillance. She has no evidence of recurrence at this time. We will continue with surveillance. She will see Dr. Marcia Gonsalez in Jan and Dr. Kamla Vides in March. Surveillance after H&N cancer, per NCCN guidelines, is as follows:  · Clinical evaluation 4-8 weeks post-treatment. If clinically looking good, obtain restaging PET at 12 weeks. · Year 1: H&P every 1-3mo  · Year 2: H&P every 2-6 mo  · Year 3-5: H&P every 4-8 mo  · Year 6+: H&P every 12 mo  · TSH every 6-12 mo  · Annual low-dose helical CT scanning of the lung for select patient  · Imaging otherwise only as indicated  · Smoking cessation, etoh counseling, speech/hearing/swallowing eval as indicated  · Dental f/u    2) Dysphagia  Persistent issue, though swallow study from 11/9/2020 reviewed and results were normal.  Now s/p cervical esophageal dilation with Dr. Marcia Gonsalez, operative note reviewed. She is drinking Ensure by mouth, used PEG tube only twice in the month, however, is losing weight now. Will need to increase Ensure by mouth to ensure she is getting enough calories. She is struggling to do this due to taste changes. If she cannot increase by mouth, then she will need to return to using the PEG tube. Keep PEG tube in for now until she is able to get in all oral nutrition without losing weight. Will ask dietician to reach out to monitor weight and oral intake. If weight stable in the coming weeks will remove PEG. 3) Port  To be removed on 12/14/2020.    4) Emotional Well Being  No psychosocial concerns identified today. Patient has adequate support. She is now on assistance with care card application.   She has remained on disability, but I think she should be able to return to work soon. I have given her a letter with return to work on 1/4/2021. This will allow time for removal of port. She would like to have PEG tube removed prior to return to work, but this might not be feasible. Plan:     · Port removal next week  · Follow up with Radonc and ENT as scheduled  · Labs in 3 months: CBC, CMP, TSH  · Return to seem me in 2-3 months    I have personally seen and evaluated the patient in conjunction with Carson Heredia NP. I find the patient's history and physical exam are consistent with the NP's documentation. I agree with the above assessment and plan, which I have edited if needed.       Signed By: Marko Phillips MD

## 2020-12-11 ENCOUNTER — OFFICE VISIT (OUTPATIENT)
Dept: ONCOLOGY | Age: 50
End: 2020-12-11
Payer: COMMERCIAL

## 2020-12-11 VITALS
HEART RATE: 57 BPM | HEIGHT: 63 IN | OXYGEN SATURATION: 97 % | TEMPERATURE: 96.4 F | RESPIRATION RATE: 16 BRPM | SYSTOLIC BLOOD PRESSURE: 115 MMHG | WEIGHT: 119.2 LBS | BODY MASS INDEX: 21.12 KG/M2 | DIASTOLIC BLOOD PRESSURE: 78 MMHG

## 2020-12-11 DIAGNOSIS — C01 SQUAMOUS CELL CARCINOMA OF BASE OF TONGUE (HCC): Primary | ICD-10-CM

## 2020-12-11 PROCEDURE — 99214 OFFICE O/P EST MOD 30 MIN: CPT | Performed by: INTERNAL MEDICINE

## 2020-12-11 NOTE — PROGRESS NOTES
Macrina Paul is a 52 y.o. female follow up for oropharyngeal cancer. 1. Have you been to the ER, urgent care clinic since your last visit? Hospitalized since your last visit?no     2. Have you seen or consulted any other health care providers outside of the [address] [place] [address] since your last visit? Include any pap smears or colon screening.  no

## 2020-12-11 NOTE — PROGRESS NOTES
4:06 PM    Spoke with patient to verify arrival time, to remain NPO after midnight, and  for transportation home. Patient verbalized understanding. Patient denies any signs, symptoms, or exposure to Covid. Expressed importance for patient to quarantined until after procedure to reduce risk of exposure. Patient verbalizes understanding.

## 2020-12-11 NOTE — LETTER
NOTIFICATION RETURN TO WORK  
 
12/11/2020 1:19 PM 
 
Ms. Tiburcio Klinefelter [address] [facility] [address] To Whom It May Concern: 
 
Tiburcio Klinefelter is currently under the care of [address] [facility]. She has been cleared to return to work without restrictions on Jan 4, 2021. If there are questions or concerns please have the patient contact our office. Sincerely, Alphnoso Barfield NP

## 2020-12-14 ENCOUNTER — HOSPITAL ENCOUNTER (OUTPATIENT)
Dept: INTERVENTIONAL RADIOLOGY/VASCULAR | Age: 50
Discharge: HOME | End: 2020-12-14
Attending: NURSE PRACTITIONER | Admitting: RADIOLOGY
Payer: COMMERCIAL

## 2020-12-14 VITALS
OXYGEN SATURATION: 99 % | HEART RATE: 70 BPM | SYSTOLIC BLOOD PRESSURE: 100 MMHG | RESPIRATION RATE: 15 BRPM | DIASTOLIC BLOOD PRESSURE: 64 MMHG | TEMPERATURE: 98.1 F

## 2020-12-14 DIAGNOSIS — C01 SQUAMOUS CELL CARCINOMA OF BASE OF TONGUE (HCC): ICD-10-CM

## 2020-12-14 PROCEDURE — 99152 MOD SED SAME PHYS/QHP 5/>YRS: CPT

## 2020-12-14 PROCEDURE — 77030031139 HC SUT VCRL2 J&J -A

## 2020-12-14 PROCEDURE — 36590 REMOVAL TUNNELED CV CATH: CPT

## 2020-12-14 PROCEDURE — 74011250636 HC RX REV CODE- 250/636: Performed by: RADIOLOGY

## 2020-12-14 PROCEDURE — 74011000250 HC RX REV CODE- 250: Performed by: RADIOLOGY

## 2020-12-14 PROCEDURE — 77030010507 HC ADH SKN DERMBND J&J -B

## 2020-12-14 PROCEDURE — 76000 FLUOROSCOPY <1 HR PHYS/QHP: CPT

## 2020-12-14 RX ORDER — FENTANYL CITRATE 50 UG/ML
25-100 INJECTION, SOLUTION INTRAMUSCULAR; INTRAVENOUS
Status: DISCONTINUED | OUTPATIENT
Start: 2020-12-14 | End: 2020-12-14 | Stop reason: HOSPADM

## 2020-12-14 RX ORDER — MIDAZOLAM HYDROCHLORIDE 1 MG/ML
1-5 INJECTION, SOLUTION INTRAMUSCULAR; INTRAVENOUS
Status: DISCONTINUED | OUTPATIENT
Start: 2020-12-14 | End: 2020-12-14 | Stop reason: HOSPADM

## 2020-12-14 RX ORDER — HEPARIN SODIUM 200 [USP'U]/100ML
500 INJECTION, SOLUTION INTRAVENOUS ONCE
Status: COMPLETED | OUTPATIENT
Start: 2020-12-14 | End: 2020-12-14

## 2020-12-14 RX ORDER — LIDOCAINE HYDROCHLORIDE AND EPINEPHRINE 10; 10 MG/ML; UG/ML
.5-5 INJECTION, SOLUTION INFILTRATION; PERINEURAL
Status: DISCONTINUED | OUTPATIENT
Start: 2020-12-14 | End: 2020-12-14 | Stop reason: HOSPADM

## 2020-12-14 RX ORDER — CEFAZOLIN SODIUM/WATER 2 G/20 ML
2 SYRINGE (ML) INTRAVENOUS ONCE
Status: DISCONTINUED | OUTPATIENT
Start: 2020-12-14 | End: 2020-12-14

## 2020-12-14 RX ORDER — LIDOCAINE HYDROCHLORIDE 10 MG/ML
10-20 INJECTION INFILTRATION; PERINEURAL
Status: DISCONTINUED | OUTPATIENT
Start: 2020-12-14 | End: 2020-12-14 | Stop reason: HOSPADM

## 2020-12-14 RX ADMIN — MIDAZOLAM HYDROCHLORIDE 2 MG: 1 INJECTION, SOLUTION INTRAMUSCULAR; INTRAVENOUS at 09:44

## 2020-12-14 RX ADMIN — LIDOCAINE HYDROCHLORIDE 10 ML: 10 INJECTION, SOLUTION INFILTRATION; PERINEURAL at 09:44

## 2020-12-14 RX ADMIN — CEFAZOLIN SODIUM 2 G: 1 INJECTION, POWDER, FOR SOLUTION INTRAMUSCULAR; INTRAVENOUS at 09:44

## 2020-12-14 RX ADMIN — HEPARIN SODIUM 1000 UNITS: 200 INJECTION, SOLUTION INTRAVENOUS at 10:05

## 2020-12-14 RX ADMIN — FENTANYL CITRATE 50 MCG: 50 INJECTION, SOLUTION INTRAMUSCULAR; INTRAVENOUS at 09:45

## 2020-12-14 NOTE — H&P
Interventional Radiology History and Physical (Outpatient)    12/14/2020    Patient: Roberto Jimenez 52 y.o. female     Referring Physician:  Garrison Olivo NP    Chief Complaint: need port out    History of Present Illness: no longer needs port    History:  Past Medical History:   Diagnosis Date    Anxiety     Cancer ([facility]     rt side neck lymph node    Dysphagia     has PEG tube (placed summer 2020)    Fainting spell 10 years ago    unknown etiology    Heart murmur     Pleural effusion, left     Tongue carcinoma (HCC)     Squamous cell - treated with chemo and radiation (completed 7/2020 with Dr. Nasir Mckoy)     Family History   Problem Relation Age of Onset    Diabetes Mother     Heart Disease Mother     Cancer Father         bone    Hypertension Father      Social History     Socioeconomic History    Marital status:      Spouse name: Not on file    Number of children: Not on file    Years of education: Not on file    Highest education level: Not on file   Occupational History    Not on file   Social Needs    Financial resource strain: Not on file    Food insecurity     Worry: Not on file     Inability: Not on file    Transportation needs     Medical: Not on file     Non-medical: Not on file   Tobacco Use    Smoking status: Current Every Day Smoker     Packs/day: 1.00     Years: 23.00     Pack years: 23.00     Types: Cigarettes    Smokeless tobacco: Never Used   Substance and Sexual Activity    Alcohol use:  Yes     Alcohol/week: 0.0 - 8.0 standard drinks    Drug use: No    Sexual activity: Yes     Partners: Male     Birth control/protection: Surgical   Lifestyle    Physical activity     Days per week: Not on file     Minutes per session: Not on file    Stress: Not on file   Relationships    Social connections     Talks on phone: Not on file     Gets together: Not on file     Attends Confucianism service: Not on file     Active member of club or organization: Not on file     Attends meetings of clubs or organizations: Not on file     Relationship status: Not on file    Intimate partner violence     Fear of current or ex partner: Not on file     Emotionally abused: Not on file     Physically abused: Not on file     Forced sexual activity: Not on file   Other Topics Concern    Not on file   Social History Narrative    Not on file       Allergies: Allergies   Allergen Reactions    Tylenol-Codeine #3 [Acetaminophen-Codeine] Itching       Prior to Admission Medications:  Prior to Admission medications    Medication Sig Start Date End Date Taking? Authorizing Provider   cholecalciferol (VITAMIN D3) 25 mcg (1,000 unit) cap Take 2,000 Units by mouth daily. Yes Provider, Historical   nut tx, lact-reduced, iron (Boost VHC) 0.09-2.25 gram-kcal/mL liqd Take 5 Cans by mouth daily. 7/1/20  Yes Bonita Underwood NP   citalopram (CELEXA) 20 mg tablet Take 20 mg by mouth every morning. Yes Provider, Historical   lidocaine-prilocaine (EMLA) topical cream Apply  to affected area as needed for Pain (Apply 30-60 min. prior to having your port accessed). 4/23/20   Alvin Galarza MD       Physical Exam:    Blood pressure 96/67, pulse 65, temperature 98.1 °F (36.7 °C), resp. rate 8, SpO2 98 %, not currently breastfeeding. General: alert, cooperative, no distress, appears stated age  Heart: rrr  Lungs: clear to auscultation bilaterally  Abdomen: soft  Neuro: grossly intact  Extremities: wnl    Plan of Care/Planned Procedure:  Risks, benefits, and alternatives reviewed with patient and she agrees to proceed with the procedure.      Luzmaria Frank MD

## 2020-12-14 NOTE — PROGRESS NOTES
Patient arrived. ID and allergies verified verbally with patient. Pt voices understanding of procedure to be performed. Consent obtained. Pt prepped for procedure. 9:23 AM  TRANSFER - OUT REPORT:    Verbal report given to [facility]-ER  RN(name) on Amy Rueda  being transferred to Angio Lab(unit) for ordered procedure       Report consisted of patients Situation, Background, Assessment and   Recommendations(SBAR). Information from the following report(s) SBAR was reviewed with the receiving nurse. Lines:   Venous Access Device VAS-CATH PORT  04/28/20 Upper chest (subclavicular area, right (Active)       Peripheral IV 12/14/20 Left Forearm (Active)   Site Assessment Clean, dry, & intact 12/14/20 0834   Phlebitis Assessment 0 12/14/20 0834   Infiltration Assessment 0 12/14/20 0834   Dressing Status Clean, dry, & intact 12/14/20 0834   Dressing Type Transparent 12/14/20 0834        Opportunity for questions and clarification was provided. Patient transported with:   Registered Nurse    10:21 AM  TRANSFER - IN REPORT:    Verbal report received from [facility]-ER RN(name) on Amy Rueda  being received from [address] [place] [address]) for routine post - op      Report consisted of patients Situation, Background, Assessment and   Recommendations(SBAR). Information from the following report(s) Procedure Summary was reviewed with the receiving nurse. Opportunity for questions and clarification was provided. Assessment completed upon patients arrival to unit and care assumed. 10:58 AM  Discharge instructions reviewed with patient and family. Voiced understanding. Patient given copy of discharge instructions to take home. 11:45 AM  Pt discharged via wheelchair with family. Personal belongings with patient upon discharge.

## 2020-12-14 NOTE — DISCHARGE INSTRUCTIONS
[place] [address]  Department of Interventional Radiology  [facility]  [phone] Office  [phone] Fax    PORT/ REMOVAL  DISCHARGE INSTRUCTIONS    General Information:     Your doctor has ordered for us to remove your  Port. This could be that you do not need it anymore, it is not doing its job, your physician has decided on another plan for your treatment and/or it may need replacing. Home Care Instructions: You can resume your regular diet and medication regimen. Do not drink alcohol, drive, or make any important legal decisions in the next 24 hours. Do not lift anything heavier than a gallon of milk, or do anything strenuous for the next 24 hours. You will notice a dressing over the site of the removal. This dressing should stay in place until the site is healed. The dressing should be changed at least every 48 hours. You should change the dressing sooner if it becomes soiled or wet. The nurse who discharges you to home should review with you any wound care instructions. Resume your normal level of activity slowly. You may shower after 24 hours, but do not take a bath, swim or immerse yourself in water until the site has healed, and keep the dressing dry with plastic wrap while showering. The site may ooze for a couple of days. This drainage should lessen with each passing day. Call If:     You should call your Physician and/or the Radiology Nurse if you have any bleeding other than a small spot on your bandage. Call if you have any signs of infection, fever, or increased pain at the site of the tube. Call if the oozing increases, if it changes color, or begins to have an odor. Follow-Up Instructions: Please see your ordering doctor as he/she has requested.

## 2020-12-14 NOTE — PROGRESS NOTES
TRANSFER - OUT REPORT:    Verbal report given to fran(name) on Anca Kwok being transferred to [facility](unit) for routine post - op       Report consisted of patient's Situation, Background, Assessment and   Recommendations(SBAR). Information from the following report(s) SBAR and Intake/Output was reviewed with the receiving nurse. Opportunity for questions and clarification was provided.       Patient transported with:   Registered Nurse

## 2020-12-17 ENCOUNTER — TELEPHONE (OUTPATIENT)
Dept: ONCOLOGY | Age: 50
End: 2020-12-17

## 2020-12-17 NOTE — TELEPHONE ENCOUNTER
[facility] [place] at [facility]  [address] [facility]  Tamra Life: [phone]  F: [phone]    Medical Nutrition Therapy      Nutrition encounter:    Still only eating banana pudding and drinking 4 Ensure (currently the original which provides . She reports it no longer get hung up in her throat. The taste is preventing her from eating more. She has tried hamburger steak, mashed potatoes, pizza rolls, french fries and all taste terrible. Not using the feeding tube anymore. Explained that she needs to continue to be trying different foods each day in effort to improve intake and prevent further weight loss. Provided her with suggestions of foods to try. Results:   Diagnosis: Oropharyngeal cancer   Started treatment on 6/9/20. Completed concurrent chemoradiation on 7/30/20   Chemotherapy Flowsheet 7/30/2020   Cycle C1D43   Date 7/30/2020   Drug / Regimen Cisplatin   Pre Hydration -   Pre Meds -   Notes -       Wt Readings from Last 5 Encounters:   12/11/20 119 lb 3.2 oz (54.1 kg)   12/03/20 121 lb 11.1 oz (55.2 kg)   11/24/20 123 lb 11.2 oz (56.1 kg)   11/05/20 129 lb (58.5 kg)   10/22/20 125 lb (56.7 kg)     Estimated Nutrition Needs:   Calorie Range: 2200-2800kcal/day     Protein Range: 60-75g/day     Fluid Needs:  2000ml     Assessment:   Inadequate oral food or beverage intake  (potential) related to concurrent chemotherapy and radiation as evidence by treatment side effects. Plan:    - Try to eat something at least twice a day. - Consume more calorically dense supplements, such as Boost plus or Ensure plus/Enlive or get strawberry syrup and mix it in Boost VHC and consume by mouth. I appreciate the opportunity to participate in Ms. Urmila Dickinson's care.     Signed By: Barbara Castillo, [address], [place] [address]     Contact: [phone]

## 2020-12-17 NOTE — TELEPHONE ENCOUNTER
Cancer [place] at [address], [address] [facility] [address] [facility] [place]: [phone]  F: [phone]    Medical Nutrition Therapy      Nutrition encounter:    Called patient to check in. No answer, left a message. Results:   Diagnosis: Oropharyngeal cancer   Started treatment on 6/9/20. Completed concurrent chemoradiation on 7/30/20   Chemotherapy Flowsheet 7/30/2020   Cycle C1D43   Date 7/30/2020   Drug / Regimen Cisplatin   Pre Hydration -   Pre Meds -   Notes -       Wt Readings from Last 5 Encounters:   12/11/20 119 lb 3.2 oz (54.1 kg)   12/03/20 121 lb 11.1 oz (55.2 kg)   11/24/20 123 lb 11.2 oz (56.1 kg)   11/05/20 129 lb (58.5 kg)   10/22/20 125 lb (56.7 kg)     Estimated Nutrition Needs:   Calorie Range: 2200-2800kcal/day     Protein Range: 60-75g/day     Fluid Needs:  2000ml     Assessment:   Inadequate oral food or beverage intake  (potential) related to concurrent chemotherapy and radiation as evidence by treatment side effects. Plan:    - Contact info provided and requested a callback. I appreciate the opportunity to participate in Ms. Urmila Dickinson's care.     Signed By: Nilo Silveira, [address], [place] [address]     Contact: [phone]

## 2020-12-18 ENCOUNTER — TELEPHONE (OUTPATIENT)
Dept: ONCOLOGY | Age: 50
End: 2020-12-18

## 2020-12-22 ENCOUNTER — TELEPHONE (OUTPATIENT)
Dept: ONCOLOGY | Age: 50
End: 2020-12-22

## 2020-12-22 NOTE — TELEPHONE ENCOUNTER
[place] at [facility]  [phone]    12/22/20- Returned patients phone call she reported that she thought someone was supposed to be calling her to schedule her PEG removal. Advised her per last note from our office on 12/11 Keep PEG tube in for now until she is able to get in all oral nutrition without losing weight. Will ask dietician to reach out to monitor weight and oral intake. If weight stable in the coming weeks will remove PEG. Patient is only eating banana pudding and ensures and not using PEG tube. She is going back to work soon and will not be able to work with PEG- she wants it removed. Advsied her returned phone call will be made with update. 10:14 AM-Per Jem Her. NP Informed patient that  group will be contacted to schedule removal- patient informed via detailed VM of the following below. Encouraged her to call back with any further questions or concerns. Phone call placed to  office [phone] spoke to [facility] she reported message will be sent to nurse that PEG needs to be removed and their office will be reaching out to patient.

## 2020-12-22 NOTE — TELEPHONE ENCOUNTER
Patient called and stated she would like to speak with someone about her feeding tube. Please call back.      # [phone]

## 2021-01-11 ENCOUNTER — TELEPHONE (OUTPATIENT)
Dept: ONCOLOGY | Age: 51
End: 2021-01-11

## 2021-01-11 NOTE — TELEPHONE ENCOUNTER
Cancer [place] at [facility]  [address] [facility], [address] [facility] [address] [facility]  Ashley Castañedae: [phone]  F: [phone]    Medical Nutrition Therapy      Nutrition encounter:    Called patient to check in. No answer, left a message. She should have had her feeding tube removed around the end of December. Results:   Diagnosis: Oropharyngeal cancer   Started treatment on 6/9/20. Completed concurrent chemoradiation on 7/30/20   Chemotherapy Flowsheet 7/30/2020   Cycle C1D43   Date 7/30/2020   Drug / Regimen Cisplatin   Pre Hydration -   Pre Meds -   Notes -       Wt Readings from Last 5 Encounters:   12/11/20 119 lb 3.2 oz (54.1 kg)   12/03/20 121 lb 11.1 oz (55.2 kg)   11/24/20 123 lb 11.2 oz (56.1 kg)   11/05/20 129 lb (58.5 kg)   10/22/20 125 lb (56.7 kg)     Estimated Nutrition Needs:   Calorie Range: 2200-2800kcal/day     Protein Range: 60-75g/day     Fluid Needs:  2000ml     Assessment:   Inadequate oral food or beverage intake  (potential) related to concurrent chemotherapy and radiation as evidence by treatment side effects. Plan:    - Contact info provided and requested a callback. I appreciate the opportunity to participate in Ms. Urmila Dickinson's care.     Signed By: Roseanna Grier, [address]     Contact: [phone]

## 2021-02-15 ENCOUNTER — TELEPHONE (OUTPATIENT)
Dept: ONCOLOGY | Age: 51
End: 2021-02-15

## 2021-02-15 NOTE — TELEPHONE ENCOUNTER
[place] at [facility]  [phone]    02/15/21- Phone call placed to pt to remind pt to have labs drawn prior to her follow up appointment with .  left for patient.

## 2021-02-20 NOTE — PROGRESS NOTES
[facility] at [address], [address] [facility] 100[address] [facility]  Norma Stewardkin: [phone]  F: [phone]      Reason for Visit:   Martina Patrick is a 54 y.o. female who is seen for follow up of oropharyngeal cancer. Treatment History:   · Biopsy of neck mass 3/25/2020: squamous cell carcinoma  · PET/CT 9/0/8649: Hypermetabolic right level 2 cervical lymph node likely corresponds to the biopsy proven squamous cell carcinoma. Focal increased tracer activity at the right tongue base/right vallecula is suspicious for primary malignancy. No evidence of distant metastatic disease. · Direct laryngoscopy by Dr. Tad Fofana 4/13/2020: Kamille Cordial tissue in right lateral tongue base without well-defined mass. Biopsies of tongue base with invasive squamous cell carcinoma, moderately differentiated, p16+. 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 Devin Philip on 6/8/2020, completed 7/30/2020  · Initiated curative intent therapy with weekly Cisplatin on 6/9/2020, completed 7/30/2020    History of Present Illness:   Her PEG was removed in December. She still struggles with eating, mostly due to taste rather than dysphagia. She has lost 17 pounds since December, she attributes this to stress from her  leaving her last month. She denies pain. Drinking ensure 4-5/day. She sees Dr. Devin Philip ([facility]) next month. She has not seen Dr. Tad Fofana in awhile. She is a smoker, working on cutting back. She is unaccompanied today. 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 She lives in [place]. Review of systems was obtained and pertinent findings reviewed above. Past medical history, social history, family history, medications, and allergies are located in the electronic medical record.       Physical Exam:     Visit Vitals  /60 (BP 1 Location: Right arm, BP Patient Position: Sitting)   Pulse 64   Temp 96.9 °F (36.1 °C)   Resp 20   Ht 5' 3\" (1.6 m)   Wt 102 lb (46.3 kg)   SpO2 99%   BMI 18.07 kg/m²     ECOG PS: 0  General: no distress  Eyes: anicteric sclerae  HENT: oropharynx clear  Neck: supple  Lymphatic: no cervical, supraclavicular, or inguinal adenopathy  Respiratory: normal respiratory effort  CV: no peripheral edema  GI: soft, nontender, nondistended, no masses  Skin: no rashes; no ecchymoses; no petechiae      Results:     Lab Results   Component Value Date/Time    WBC 5.1 07/29/2020 10:44 AM    HGB 10.3 (L) 07/29/2020 10:44 AM    HCT 29.9 (L) 07/29/2020 10:44 AM    PLATELET 049 32/09/7547 10:44 AM    MCV 92.6 07/29/2020 10:44 AM    ABS. NEUTROPHILS 4.1 07/29/2020 10:44 AM     Lab Results   Component Value Date/Time    Sodium 138 10/08/2020 09:08 AM    Potassium 3.8 10/08/2020 09:08 AM    Chloride 104 10/08/2020 09:08 AM    CO2 30 10/08/2020 09:08 AM    Glucose 142 (H) 10/08/2020 09:08 AM    BUN 22 (H) 10/08/2020 09:08 AM    Creatinine 0.75 10/08/2020 09:08 AM    GFR est AA >60 10/08/2020 09:08 AM    GFR est non-AA >60 10/08/2020 09:08 AM    Calcium 8.9 10/08/2020 09:08 AM     Lab Results   Component Value Date/Time    Bilirubin, total 0.2 07/29/2020 10:15 AM    ALT (SGPT) 25 07/29/2020 10:15 AM    Alk. phosphatase 90 07/29/2020 10:15 AM    Protein, total 6.8 07/29/2020 10:15 AM    Albumin 2.9 (L) 07/29/2020 10:15 AM    Globulin 3.9 07/29/2020 10:15 AM       Abd xray 7/9/2020:  1. Interval development of a moderate left pleural effusion with left basilar  atelectasis. 2. Percutaneous gastrostomy tube. No complicating features noted. PET 10/22/2020:   No Foci of Abnormal Hypermetabolism. Assessment:   1) Squamous cell carcinoma of the right base of tongue  Stage I, p16+  She had biopsy proven disease in her base of tongue and right cervical node. 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   However, she was also a smoker and this may have driven her disease as well. She completed chemoradiation with weekly cisplatin in 7/2020, and she is now followed with surveillance. She has no evidence of recurrence at this time. We will continue with surveillance. She will see Dr. Karli Díaz in March. She does not have an appointment with Dr. Verneita Dakins, I encouraged her to schedule this. Surveillance after H&N cancer, per NCCN guidelines, is as follows:  · Clinical evaluation 4-8 weeks post-treatment. If clinically looking good, obtain restaging PET at 12 weeks. · Year 1: H&P every 1-3mo  · Year 2: H&P every 2-6 mo  · Year 3-5: H&P every 4-8 mo  · Year 6+: H&P every 12 mo  · TSH every 6-12 mo  · Annual low-dose helical CT scanning of the lung for select patient  · Imaging otherwise only as indicated  · Smoking cessation, etoh counseling, speech/hearing/swallowing eval as indicated  · Dental f/u    2) Dysphagia  Persistent issue, though swallow study from 11/9/2020 reviewed and results were normal.  Now s/p cervical esophageal dilation with Dr. Verneita Dakins. PEG is out. Still losing weight, but no dysphagia. I encouraged her to increase her calorie intake. Offered dietician services as well. 3) Port  Removed on 12/14/2020.       Plan:     · Labs today: CBC, CMP, TSH  · Follow up with Radonc and ENT  · Return to seem me in 3 months        Signed By: Ekaterina Barnes MD

## 2021-02-25 ENCOUNTER — OFFICE VISIT (OUTPATIENT)
Dept: ONCOLOGY | Age: 51
End: 2021-02-25
Payer: COMMERCIAL

## 2021-02-25 VITALS
RESPIRATION RATE: 20 BRPM | OXYGEN SATURATION: 99 % | WEIGHT: 102 LBS | SYSTOLIC BLOOD PRESSURE: 114 MMHG | BODY MASS INDEX: 18.07 KG/M2 | DIASTOLIC BLOOD PRESSURE: 60 MMHG | TEMPERATURE: 96.9 F | HEART RATE: 64 BPM | HEIGHT: 63 IN

## 2021-02-25 DIAGNOSIS — C01 SQUAMOUS CELL CARCINOMA OF BASE OF TONGUE (HCC): Primary | ICD-10-CM

## 2021-02-25 PROCEDURE — 99214 OFFICE O/P EST MOD 30 MIN: CPT | Performed by: INTERNAL MEDICINE

## 2021-02-25 NOTE — PROGRESS NOTES
Geeta Lewis is a 54 y.o. female follow up for oropharyngeal cancer. 1. Have you been to the ER, urgent care clinic since your last visit? Hospitalized since your last visit?no     2. Have you seen or consulted any other health care providers outside of the [address] [place] [address] since your last visit? Include any pap smears or colon screening.  no

## 2021-04-02 ENCOUNTER — TELEPHONE (OUTPATIENT)
Dept: ONCOLOGY | Age: 51
End: 2021-04-02

## 2021-04-02 NOTE — TELEPHONE ENCOUNTER
[employer] at [facility]  [phone]    04/02/21- Lab results faxed to patient's PCP, Mike Ohara NP at [phone]. Fax confirmation received.

## 2021-04-02 NOTE — TELEPHONE ENCOUNTER
Patient left a voicemail stating that she would like her blood work sent to [facility]. Please advise.     CB# [phone]

## 2021-05-22 NOTE — PROGRESS NOTES
[facility] at [place] [address] [place]: [phone]  F: [phone]      Reason for Visit:   Jeromy Villegas is a 54 y.o. female who is seen for follow up of oropharyngeal cancer. Treatment History:   · Biopsy of neck mass 3/25/2020: squamous cell carcinoma  · PET/CT 1/5/1457: Hypermetabolic right level 2 cervical lymph node likely corresponds to the biopsy proven squamous cell carcinoma. Focal increased tracer activity at the right tongue base/right vallecula is suspicious for primary malignancy. No evidence of distant metastatic disease. · Direct laryngoscopy by Dr. Hero Reddy 4/13/2020: Gearl Pérez tissue in right lateral tongue base without well-defined mass. Biopsies of tongue base with invasive squamous cell carcinoma, moderately differentiated, p16+. 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 Mikel Avila on 6/8/2020, completed 7/30/2020  · Initiated curative intent therapy with weekly Cisplatin on 6/9/2020, completed 7/30/2020    History of Present Illness:   She reports her taste buds have improved and she is now eating much better. Starting to gain some weight back. She saw GI recently and will be having a colonoscopy and EGD in a few weeks. Dry mouth improving, but not resolved. Swallowing pretty well. She continues to smoke 1ppd. She is unaccompanied today. 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 Review of systems was obtained and pertinent findings reviewed above. Past medical history, social history, family history, medications, and allergies are located in the electronic medical record.       Physical Exam:     Visit Vitals  /72 (BP 1 Location: Left arm, BP Patient Position: Sitting)   Pulse 76   Temp (!) 96.6 °F (35.9 °C) (Oral)   Resp 16   Ht 5' 3\" (1.6 m)   Wt 97 lb 9.6 oz (44.3 kg)   SpO2 96%   BMI 17.29 kg/m² ECOG PS: 0  General: no distress  Eyes: anicteric sclerae  HENT: oropharynx clear  Neck: supple  Lymphatic: no cervical, supraclavicular, or inguinal adenopathy  Respiratory: normal respiratory effort  CV: no peripheral edema  GI: soft, nontender, nondistended, no masses  Skin: no rashes; no ecchymoses; no petechiae      Results:     Lab Results   Component Value Date/Time    WBC 4.9 02/25/2021 09:11 AM    HGB 13.3 02/25/2021 09:11 AM    HCT 41.3 02/25/2021 09:11 AM    PLATELET 960 80/93/0961 09:11 AM    MCV 96.3 02/25/2021 09:11 AM    ABS. NEUTROPHILS 3.8 02/25/2021 09:11 AM     Lab Results   Component Value Date/Time    Sodium 141 02/25/2021 09:11 AM    Potassium 4.7 02/25/2021 09:11 AM    Chloride 106 02/25/2021 09:11 AM    CO2 29 02/25/2021 09:11 AM    Glucose 128 (H) 02/25/2021 09:11 AM    BUN 13 02/25/2021 09:11 AM    Creatinine 0.91 02/25/2021 09:11 AM    GFR est AA >60 02/25/2021 09:11 AM    GFR est non-AA >60 02/25/2021 09:11 AM    Calcium 9.9 02/25/2021 09:11 AM     Lab Results   Component Value Date/Time    Bilirubin, total 0.4 02/25/2021 09:11 AM    ALT (SGPT) 19 02/25/2021 09:11 AM    Alk. phosphatase 92 02/25/2021 09:11 AM    Protein, total 7.2 02/25/2021 09:11 AM    Albumin 4.1 02/25/2021 09:11 AM    Globulin 3.1 02/25/2021 09:11 AM     TSH (uIU/mL)   Date Value   02/25/2021 1.89         Abd xray 7/9/2020:  1. Interval development of a moderate left pleural effusion with left basilar  atelectasis. 2. Percutaneous gastrostomy tube. No complicating features noted. PET 10/22/2020:   No Foci of Abnormal Hypermetabolism. Assessment:   1) Squamous cell carcinoma of the right base of tongue  Stage I, p16+  She had biopsy proven disease in her base of tongue and right cervical node. 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 However, she was also a smoker and this may have driven her disease as well.   She completed chemoradiation with weekly cisplatin in 7/2020, and she is now followed with surveillance. She has no evidence of recurrence at this time. We will continue with surveillance. Note from Dr. Trudi Murry reviewed, he plans to see her again in August.  She sees Dr. Trina Nelson again in December. Surveillance after H&N cancer, per NCCN guidelines, is as follows:  · Clinical evaluation 4-8 weeks post-treatment. If clinically looking good, obtain restaging PET at 12 weeks. · Year 1: H&P every 1-3mo  · Year 2: H&P every 2-6 mo  · Year 3-5: H&P every 4-8 mo  · Year 6+: H&P every 12 mo  · TSH every 6-12 mo  · Annual low-dose helical CT scanning of the lung for select patient  · Imaging otherwise only as indicated  · Smoking cessation, etoh counseling, speech/hearing/swallowing eval as indicated  · Dental f/u    2) Dysphagia, Anorexia  Weight down again today compared to last visit. However, she reports this is actually improving now. Currently 97 pounds, had been down to 92 pound on home scale.   Seeing GI soon for EGD and colonoscopy      Plan:     · Labs before next visit: renal function panel, TSH  · Follow up with Radonc and ENT as scheduled  · Return to seem me in 6 months        Signed By: Jeri Benton MD

## 2021-05-27 ENCOUNTER — TELEPHONE (OUTPATIENT)
Dept: ONCOLOGY | Age: 51
End: 2021-05-27

## 2021-05-28 ENCOUNTER — OFFICE VISIT (OUTPATIENT)
Dept: ONCOLOGY | Age: 51
End: 2021-05-28
Payer: COMMERCIAL

## 2021-05-28 VITALS
RESPIRATION RATE: 16 BRPM | DIASTOLIC BLOOD PRESSURE: 72 MMHG | OXYGEN SATURATION: 96 % | HEIGHT: 63 IN | BODY MASS INDEX: 17.29 KG/M2 | HEART RATE: 76 BPM | SYSTOLIC BLOOD PRESSURE: 101 MMHG | WEIGHT: 97.6 LBS | TEMPERATURE: 96.6 F

## 2021-05-28 DIAGNOSIS — C01 SQUAMOUS CELL CARCINOMA OF BASE OF TONGUE (HCC): Primary | ICD-10-CM

## 2021-05-28 PROCEDURE — 99214 OFFICE O/P EST MOD 30 MIN: CPT | Performed by: INTERNAL MEDICINE

## 2021-05-28 RX ORDER — PAROXETINE HYDROCHLORIDE 20 MG/1
TABLET, FILM COATED ORAL DAILY
COMMUNITY

## 2021-05-28 NOTE — PROGRESS NOTES
Ginger Sotelo is a 54 y.o. female here for follow up of oropharyngeal cancer. 1. Have you been to the ER, urgent care clinic since your last visit? Hospitalized since your last visit? No    2. Have you seen or consulted any other health care providers outside of the [address] [place] [address] since your last visit? Include any pap smears or colon screening.  No

## 2021-08-05 ENCOUNTER — PATIENT MESSAGE (OUTPATIENT)
Dept: ONCOLOGY | Age: 51
End: 2021-08-05

## 2021-08-13 ENCOUNTER — HOSPITAL ENCOUNTER (OUTPATIENT)
Dept: RADIATION THERAPY | Age: 51
Discharge: HOME | End: 2021-08-13

## 2021-08-13 VITALS — HEIGHT: 63 IN | BODY MASS INDEX: 17.63 KG/M2 | WEIGHT: 99.5 LBS

## 2021-09-27 ENCOUNTER — TELEPHONE (OUTPATIENT)
Dept: ONCOLOGY | Age: 51
End: 2021-09-27

## 2021-09-28 NOTE — TELEPHONE ENCOUNTER
[address]  Social Work Navigator Encounter     Patient Name:  Zack Fowler.    Medical History: h&n cancer    Advance Directives: none on file    Narrative: Return call placed to patient. Message left on voicemail requesting a return call. Spoke with patient who requested an update on the status of her FAP application. Explained a letter was scan into her chart on 9/1 from the [employer] Department advising her application was denied because she does not have a balance with [facility]. Patient voiced she believes she does as she has a recent colonoscopy which she received a bill. Patient does not have this billing statement with her but will call me back when she does. Barriers to Care: financial     Plan:  1. Patient will contact this  with information on medical bills. Will assess for financial assistance options one information is received.      Referral:   Insurance/Entitlements referral  Financial/Medication assistance referral

## 2021-09-29 ENCOUNTER — TELEPHONE (OUTPATIENT)
Dept: ONCOLOGY | Age: 51
End: 2021-09-29

## 2021-09-29 NOTE — TELEPHONE ENCOUNTER
[employer]  Social Work Navigator Encounter     Patient Name:  Devin Stock    Medical History: h&n cancer     Advance Directives: none on file    Narrative: Follow-up call placed to patient regarding financial assistance for the following bill:    [facility]   [phone]  Date of Service 6/10  Account # [de-identified]  $[id number]    Explained that [facility] does not have their own financial assistance program. They do accepts the Care Card but patient is not eligible at this time as she does not have a balance with [facility]. Patient is able to call [facility] to setup a payment plan with them. Patient voiced understanding. Barriers to Care: financial     Plan:  1. Referred patient to [facility] to setup a payment arrangement.      Referral:   Other referral

## 2022-01-03 NOTE — PROGRESS NOTES
[facility] at [place] [address] [facility]  Sumaya [phone]  F: [phone]      Reason for Visit:   Shae Ferrer is a 54 y.o. female who is seen for follow up of oropharyngeal cancer. Treatment History:   · Biopsy of neck mass 3/25/2020: squamous cell carcinoma  · PET/CT : Hypermetabolic right level 2 cervical lymph node likely corresponds to the biopsy proven squamous cell carcinoma. Focal increased tracer activity at the right tongue base/right vallecula is suspicious for primary malignancy. No evidence of distant metastatic disease. · Direct laryngoscopy by Dr. Jarocho Coto 2020: Maurice Marquez tissue in right lateral tongue base without well-defined mass. Biopsies of tongue base with invasive squamous cell carcinoma, moderately differentiated, p16+. 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 Kaylin Anaya on 2020, completed 2020  · Initiated curative intent therapy with weekly Cisplatin on 2020, completed 2020    History of Present Illness:   She reports doing well since last here. Still having dry mouth which limits her eating somewhat, but she can eat whatever she wants. Denies pain. Breathing fine, no dyspnea or cough. She has gained some weight since last here. She reports seeing GI since last here, had EGD and colonoscopy which were ok. She continues to smoke 1ppd. She is unaccompanied today. 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 Review of systems was obtained and pertinent findings reviewed above. Past medical history, social history, family history, medications, and allergies are located in the electronic medical record.       Physical Exam:     Visit Vitals  /77 (BP 1 Location: Left upper arm, BP Patient Position: Sitting, BP Cuff Size: Large adult)   Pulse 60   Temp (!) 96 °F (35.6 °C) (Temporal)   Resp 16   Ht 5' 3\" (1.6 m)   Wt 107 lb (48.5 kg)   SpO2 97%   BMI 18.95 kg/m²     ECOG PS: 0  General: no distress  Eyes: anicteric sclerae  HENT: oropharynx clear  Neck: supple  Lymphatic: no cervical, supraclavicular, or inguinal adenopathy  Respiratory: normal respiratory effort  CV: no peripheral edema  GI: soft, nontender, nondistended, no masses  Skin: no rashes; no ecchymoses; no petechiae      Results:     Lab Results   Component Value Date/Time    WBC 4.9 02/25/2021 09:11 AM    HGB 13.3 02/25/2021 09:11 AM    HCT 41.3 02/25/2021 09:11 AM    PLATELET 024 79/89/9312 09:11 AM    MCV 96.3 02/25/2021 09:11 AM    ABS. NEUTROPHILS 3.8 02/25/2021 09:11 AM     Lab Results   Component Value Date/Time    Sodium 141 02/25/2021 09:11 AM    Potassium 4.7 02/25/2021 09:11 AM    Chloride 106 02/25/2021 09:11 AM    CO2 29 02/25/2021 09:11 AM    Glucose 128 (H) 02/25/2021 09:11 AM    BUN 13 02/25/2021 09:11 AM    Creatinine 0.91 02/25/2021 09:11 AM    GFR est AA >60 02/25/2021 09:11 AM    GFR est non-AA >60 02/25/2021 09:11 AM    Calcium 9.9 02/25/2021 09:11 AM     Lab Results   Component Value Date/Time    Bilirubin, total 0.4 02/25/2021 09:11 AM    ALT (SGPT) 19 02/25/2021 09:11 AM    Alk. phosphatase 92 02/25/2021 09:11 AM    Protein, total 7.2 02/25/2021 09:11 AM    Albumin 4.1 02/25/2021 09:11 AM    Globulin 3.1 02/25/2021 09:11 AM     TSH (uIU/mL)   Date Value   02/25/2021 1.89         Abd xray 7/9/2020:  1. Interval development of a moderate left pleural effusion with left basilar  atelectasis. 2. Percutaneous gastrostomy tube. No complicating features noted. PET 10/22/2020:   No Foci of Abnormal Hypermetabolism. Assessment:   1) Squamous cell carcinoma of the right base of tongue  Stage I, p16+  She had biopsy proven disease in her base of tongue and right cervical node.   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 However, she was also a smoker and this may have driven her disease as well. She completed chemoradiation with weekly cisplatin in 7/2020, and she is now followed with surveillance. She has no evidence of recurrence at this time. We will continue with surveillance. She continues to follow with Dr. Nayana Mcdonnell, sees him again on 6/17/2022. She follows with Dr. Alison Martinez as well, sees him again on 4/15/2022. Surveillance after H&N cancer, per NCCN guidelines, is as follows:  · Clinical evaluation 4-8 weeks post-treatment. If clinically looking good, obtain restaging PET at 12 weeks. · Year 1: H&P every 1-3mo  · Year 2: H&P every 2-6 mo  · Year 3-5: H&P every 4-8 mo  · Year 6+: H&P every 12 mo  · TSH every 6-12 mo  · Annual low-dose helical CT scanning of the lung for select patient  · Imaging otherwise only as indicated  · Smoking cessation, etoh counseling, speech/hearing/swallowing eval as indicated  · Dental f/u    2) Dysphagia, Anorexia  Gaining some weight now. Eating much better. EGD and colonoscopy she reports were ok.       Plan:     · Labs today: CBC, BMP, TSH  · Follow up with Radonc and ENT as scheduled  · Return to seem me in 6 months        Signed By: Garth Vuong MD

## 2022-01-07 ENCOUNTER — TELEPHONE (OUTPATIENT)
Dept: ONCOLOGY | Age: 52
End: 2022-01-07

## 2022-01-07 ENCOUNTER — OFFICE VISIT (OUTPATIENT)
Dept: ONCOLOGY | Age: 52
End: 2022-01-07
Payer: COMMERCIAL

## 2022-01-07 VITALS
WEIGHT: 107 LBS | SYSTOLIC BLOOD PRESSURE: 136 MMHG | DIASTOLIC BLOOD PRESSURE: 77 MMHG | BODY MASS INDEX: 18.96 KG/M2 | TEMPERATURE: 96 F | OXYGEN SATURATION: 97 % | HEART RATE: 60 BPM | HEIGHT: 63 IN | RESPIRATION RATE: 16 BRPM

## 2022-01-07 DIAGNOSIS — C01 SQUAMOUS CELL CARCINOMA OF BASE OF TONGUE (HCC): Primary | ICD-10-CM

## 2022-01-07 PROCEDURE — 99214 OFFICE O/P EST MOD 30 MIN: CPT | Performed by: INTERNAL MEDICINE

## 2022-01-07 NOTE — PROGRESS NOTES
Bonita Kapoor is a 54 y.o. female follow up for oropharyngeal cancer. 1. Have you been to the ER, urgent care clinic since your last visit? Hospitalized since your last visit?no     2. Have you seen or consulted any other health care providers outside of the [address] [place] [address] since your last visit? Include any pap smears or colon screening.  no

## 2022-01-07 NOTE — TELEPHONE ENCOUNTER
[address] at [facility]  [phone]    01/07/22- Informed patient that per Dr. Liu Hoffman reviewed. TSH has risen to 5. 15. She may be developing hypothyroidism secondary to her prior radiation, and she may need to start a thyroid supplement. I recommend further evaluation with her PCP or we can refer to endocrinology. \"  Patient verbalized understanding. Notes and labs faxed to PCP, confirmation received.

## 2022-01-07 NOTE — TELEPHONE ENCOUNTER
[address] at [facility] [address]  [phone]    Labs reviewed. TSH has risen to 5. 15. She may be developing hypothyroidism secondary to her prior radiation, and she may need to start a thyroid supplement. I recommend further evaluation with her PCP or we can refer to endocrinology.

## 2022-03-19 PROBLEM — R59.0 ANTERIOR CERVICAL LYMPHADENOPATHY: Status: ACTIVE | Noted: 2020-04-13

## 2022-03-19 PROBLEM — C01 SQUAMOUS CELL CARCINOMA OF BASE OF TONGUE (HCC): Status: ACTIVE | Noted: 2020-04-23

## 2022-03-20 PROBLEM — R13.10 DYSPHAGIA: Status: ACTIVE | Noted: 2020-12-03

## 2022-06-17 ENCOUNTER — HOSPITAL ENCOUNTER (OUTPATIENT)
Dept: RADIATION THERAPY | Age: 52
Discharge: HOME | End: 2022-06-17

## 2022-07-13 NOTE — PROGRESS NOTES
[facility] [place] at [address] [facility]  Maria Del Carmen Deutscher: [phone]  F: [phone]      Reason for Visit:   Geeta Lewis is a 50 y.o. female who is seen for follow up of oropharyngeal cancer. Treatment History:   · Biopsy of neck mass 3/25/2020: squamous cell carcinoma  · PET/CT 2/1/9302: Hypermetabolic right level 2 cervical lymph node likely corresponds to the biopsy proven squamous cell carcinoma. Focal increased tracer activity at the right tongue base/right vallecula is suspicious for primary malignancy. No evidence of distant metastatic disease. · Direct laryngoscopy by Dr. Hernandez Outlmary 4/13/2020: Edwar Siemens tissue in right lateral tongue base without well-defined mass. Biopsies of tongue base with invasive squamous cell carcinoma, moderately differentiated, p16+. 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 Fransico Grey on 6/8/2020, completed 7/30/2020  · Initiated curative intent therapy with weekly Cisplatin on 6/9/2020, completed 7/30/2020    History of Present Illness:   Her TSH was noted to be elevated in January. She saw her PCP and was started on synthroid. She cannot recall if she ever had follow up labs. Some itching at her prior feeding tube site, some occasional pain, infrequent. Eating ok, stable weight. Dry mouth stable. No respiratory complaints. She continues to smoke 1ppd. She is unaccompanied today. 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 She lives in [place]. Review of systems was obtained and pertinent findings reviewed above. Past medical history, social history, family history, medications, and allergies are located in the electronic medical record.       Physical Exam:     Visit Vitals  /81 (BP 1 Location: Left upper arm, BP Patient Position: Sitting, BP Cuff Size: Large adult)   Pulse 65   Temp 97 °F (36.1 °C) (Temporal)   Resp 16   Ht 5' 3\" (1.6 m)   Wt 104 lb (47.2 kg)   SpO2 98%   BMI 18.42 kg/m²     ECOG PS: 0  General: no distress  Eyes: anicteric sclerae  HENT: oropharynx clear  Neck: supple  Lymphatic: no cervical, supraclavicular, or inguinal adenopathy  Respiratory: normal respiratory effort  CV: no peripheral edema  GI: soft, nontender, nondistended, no masses  Skin: no rashes; no ecchymoses; no petechiae      Results:     Lab Results   Component Value Date/Time    WBC 6.3 01/07/2022 09:01 AM    HGB 12.6 01/07/2022 09:01 AM    HCT 39.2 01/07/2022 09:01 AM    PLATELET 732 29/92/2894 09:01 AM    MCV 93.1 01/07/2022 09:01 AM    ABS. NEUTROPHILS 4.4 01/07/2022 09:01 AM     Lab Results   Component Value Date/Time    Sodium 140 01/07/2022 09:01 AM    Potassium 4.6 01/07/2022 09:01 AM    Chloride 107 01/07/2022 09:01 AM    CO2 28 01/07/2022 09:01 AM    Glucose 107 (H) 01/07/2022 09:01 AM    BUN 27 (H) 01/07/2022 09:01 AM    Creatinine 0.87 01/07/2022 09:01 AM    GFR est AA >60 01/07/2022 09:01 AM    GFR est non-AA >60 01/07/2022 09:01 AM    Calcium 9.8 01/07/2022 09:01 AM     Lab Results   Component Value Date/Time    Bilirubin, total 0.4 02/25/2021 09:11 AM    ALT (SGPT) 19 02/25/2021 09:11 AM    Alk. phosphatase 92 02/25/2021 09:11 AM    Protein, total 7.2 02/25/2021 09:11 AM    Albumin 4.1 02/25/2021 09:11 AM    Globulin 3.1 02/25/2021 09:11 AM     TSH (uIU/mL)   Date Value   01/07/2022 5.15 (H)   02/25/2021 1.89         Abd xray 7/9/2020:  1. Interval development of a moderate left pleural effusion with left basilar  atelectasis. 2. Percutaneous gastrostomy tube. No complicating features noted. PET 10/22/2020:   No Foci of Abnormal Hypermetabolism. Assessment:   1) Squamous cell carcinoma of the right base of tongue  Stage I, p16+  She had biopsy proven disease in her base of tongue and right cervical node.   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 However, she was also a smoker and this may have driven her disease as well. She completed chemoradiation with weekly cisplatin in 7/2020, and she is now followed with surveillance. She has no evidence of recurrence at this time. We will continue with surveillance. She continues to follow with Dr. Gem oRmero (radon) and Dr. Jolene Kam (ENT). I reviewed Dr. Jolene Kam note from 4/2022, exam was benign. Surveillance after H&N cancer, per NCCN guidelines, is as follows:  · Clinical evaluation 4-8 weeks post-treatment. If clinically looking good, obtain restaging PET at 12 weeks. · Year 1: H&P every 1-3mo  · Year 2: H&P every 2-6 mo  · Year 3-5: H&P every 4-8 mo  · Year 6+: H&P every 12 mo  · TSH every 6-12 mo  · Annual low-dose helical CT scanning of the lung for select patient  · Imaging otherwise only as indicated  · Smoking cessation, etoh counseling, speech/hearing/swallowing eval as indicated  · Dental f/u    2) Dysphagia, Anorexia  Gaining some weight now. Eating much better. EGD and colonoscopy she reports were ok. 3) Hypothyroidism  Secondary to radiation. Her PCP is managing, now on synthroid. She may be past due for TSH check, I will check today and send to her PCP. 4) Pain at prior feeding tube site  Mild, intermittent. Benign exam.  Perhaps scar tissue? Monitor. Consider CT if worsening.     Plan:     · Labs today: CBC, BMP, TSH  · Follow up with Radonc and ENT as scheduled  · Return to seem me in 6 months        Signed By: Jax Crawford MD

## 2022-07-15 ENCOUNTER — OFFICE VISIT (OUTPATIENT)
Dept: ONCOLOGY | Age: 52
End: 2022-07-15
Payer: COMMERCIAL

## 2022-07-15 VITALS
RESPIRATION RATE: 16 BRPM | SYSTOLIC BLOOD PRESSURE: 123 MMHG | TEMPERATURE: 97 F | HEART RATE: 65 BPM | WEIGHT: 104 LBS | HEIGHT: 63 IN | DIASTOLIC BLOOD PRESSURE: 81 MMHG | BODY MASS INDEX: 18.43 KG/M2 | OXYGEN SATURATION: 98 %

## 2022-07-15 DIAGNOSIS — C01 SQUAMOUS CELL CARCINOMA OF BASE OF TONGUE (HCC): ICD-10-CM

## 2022-07-15 DIAGNOSIS — E03.9 ACQUIRED HYPOTHYROIDISM: ICD-10-CM

## 2022-07-15 DIAGNOSIS — C01 SQUAMOUS CELL CARCINOMA OF BASE OF TONGUE (HCC): Primary | ICD-10-CM

## 2022-07-15 LAB
ANION GAP SERPL CALC-SCNC: 4 MMOL/L (ref 5–15)
BASOPHILS # BLD: 0 K/UL (ref 0–0.1)
BASOPHILS NFR BLD: 1 % (ref 0–1)
BUN SERPL-MCNC: 17 MG/DL (ref 6–20)
BUN/CREAT SERPL: 20 (ref 12–20)
CALCIUM SERPL-MCNC: 10 MG/DL (ref 8.5–10.1)
CHLORIDE SERPL-SCNC: 106 MMOL/L (ref 97–108)
CO2 SERPL-SCNC: 28 MMOL/L (ref 21–32)
CREAT SERPL-MCNC: 0.83 MG/DL (ref 0.55–1.02)
DIFFERENTIAL METHOD BLD: ABNORMAL
EOSINOPHIL # BLD: 0 K/UL (ref 0–0.4)
EOSINOPHIL NFR BLD: 1 % (ref 0–7)
ERYTHROCYTE [DISTWIDTH] IN BLOOD BY AUTOMATED COUNT: 14.7 % (ref 11.5–14.5)
GLUCOSE SERPL-MCNC: 111 MG/DL (ref 65–100)
HCT VFR BLD AUTO: 41.7 % (ref 35–47)
HGB BLD-MCNC: 13.7 G/DL (ref 11.5–16)
IMM GRANULOCYTES # BLD AUTO: 0 K/UL (ref 0–0.04)
IMM GRANULOCYTES NFR BLD AUTO: 0 % (ref 0–0.5)
LYMPHOCYTES # BLD: 1.6 K/UL (ref 0.8–3.5)
LYMPHOCYTES NFR BLD: 25 % (ref 12–49)
MCH RBC QN AUTO: 30.5 PG (ref 26–34)
MCHC RBC AUTO-ENTMCNC: 32.9 G/DL (ref 30–36.5)
MCV RBC AUTO: 92.9 FL (ref 80–99)
MONOCYTES # BLD: 0.5 K/UL (ref 0–1)
MONOCYTES NFR BLD: 8 % (ref 5–13)
NEUTS SEG # BLD: 4.1 K/UL (ref 1.8–8)
NEUTS SEG NFR BLD: 65 % (ref 32–75)
NRBC # BLD: 0 K/UL (ref 0–0.01)
NRBC BLD-RTO: 0 PER 100 WBC
PLATELET # BLD AUTO: 289 K/UL (ref 150–400)
PMV BLD AUTO: 10.1 FL (ref 8.9–12.9)
POTASSIUM SERPL-SCNC: 4.7 MMOL/L (ref 3.5–5.1)
RBC # BLD AUTO: 4.49 M/UL (ref 3.8–5.2)
SODIUM SERPL-SCNC: 138 MMOL/L (ref 136–145)
TSH SERPL DL<=0.05 MIU/L-ACNC: 2.56 UIU/ML (ref 0.36–3.74)
WBC # BLD AUTO: 6.3 K/UL (ref 3.6–11)

## 2022-07-15 PROCEDURE — 99214 OFFICE O/P EST MOD 30 MIN: CPT | Performed by: INTERNAL MEDICINE

## 2022-07-15 RX ORDER — NAPROXEN 500 MG/1
TABLET ORAL
COMMUNITY
Start: 2022-05-05

## 2022-07-15 RX ORDER — LEVOTHYROXINE SODIUM 25 UG/1
25 TABLET ORAL DAILY
COMMUNITY
Start: 2022-05-02

## 2022-07-15 NOTE — PROGRESS NOTES
Gwen Artis is a 50 y.o. female follow up for oropharyngeal cancer. 1. Have you been to the ER, urgent care clinic since your last visit? Hospitalized since your last visit?no     2. Have you seen or consulted any other health care providers outside of the [address] [place] [address] since your last visit? Include any pap smears or colon screening.  no

## 2023-01-16 NOTE — PROGRESS NOTES
[facility] [place] at [place] [address] [facility] [address] 100[address] [facility] Gun: [phone]  F: [phone]      Reason for Visit:   Zina Ovalle is a 50 y.o. female who is seen for follow up of oropharyngeal cancer. Treatment History:   Biopsy of neck mass 3/25/2020: squamous cell carcinoma  PET/CT 2/8/9359: Hypermetabolic right level 2 cervical lymph node likely corresponds to the biopsy proven squamous cell carcinoma. Focal increased tracer activity at the right tongue base/right vallecula is suspicious for primary malignancy. No evidence of distant metastatic disease. Direct laryngoscopy by Dr. Magda Velazquez 4/13/2020: [place] Salle tissue in right lateral tongue base without well-defined mass. Biopsies of tongue base with invasive squamous cell carcinoma, moderately differentiated, p16+. 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 Arnaud Cuadra on 6/8/2020, completed 7/30/2020  Initiated curative intent therapy with weekly Cisplatin on 6/9/2020, completed 7/30/2020    History of Present Illness:   Doing well overall. No nausea or vomiting. No change in bowels. Denies pain. Still struggles with dry mouth, this is not worsening at all. Drinking fluids without issue. No change in bowels. Denies pain in throat or pain with swallowing. She continues to smoke 1ppd. She is unaccompanied today. 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 Review of systems was obtained and pertinent findings reviewed above. Past medical history, social history, family history, medications, and allergies are located in the electronic medical record. Physical Exam:     Visit Vitals  /85 (BP 1 Location: Left upper arm, BP Patient Position: Sitting, BP Cuff Size: Large adult) Comment: .    Pulse (!) 53   Temp 97.4 °F (36.3 °C) (Temporal)   Resp 16   Ht 5' 3\" (1.6 m)   Wt 104 lb (47.2 kg) SpO2 97%   BMI 18.42 kg/m²       ECOG PS: 0  General: no distress  Eyes: anicteric sclerae  HENT: oropharynx clear  Neck: supple  Lymphatic: no cervical, supraclavicular, or inguinal adenopathy  Respiratory: normal respiratory effort  CV: no peripheral edema  GI: soft, nontender, nondistended, no masses  Skin: no rashes; no ecchymoses; no petechiae    Results:     Lab Results   Component Value Date/Time    WBC 6.3 07/15/2022 08:57 AM    HGB 13.7 07/15/2022 08:57 AM    HCT 41.7 07/15/2022 08:57 AM    PLATELET 928 08/45/0113 08:57 AM    MCV 92.9 07/15/2022 08:57 AM    ABS. NEUTROPHILS 4.1 07/15/2022 08:57 AM     Lab Results   Component Value Date/Time    Sodium 138 07/15/2022 08:57 AM    Potassium 4.7 07/15/2022 08:57 AM    Chloride 106 07/15/2022 08:57 AM    CO2 28 07/15/2022 08:57 AM    Glucose 111 (H) 07/15/2022 08:57 AM    BUN 17 07/15/2022 08:57 AM    Creatinine 0.83 07/15/2022 08:57 AM    GFR est AA >60 07/15/2022 08:57 AM    GFR est non-AA >60 07/15/2022 08:57 AM    Calcium 10.0 07/15/2022 08:57 AM     Lab Results   Component Value Date/Time    Bilirubin, total 0.4 02/25/2021 09:11 AM    ALT (SGPT) 19 02/25/2021 09:11 AM    Alk. phosphatase 92 02/25/2021 09:11 AM    Protein, total 7.2 02/25/2021 09:11 AM    Albumin 4.1 02/25/2021 09:11 AM    Globulin 3.1 02/25/2021 09:11 AM     TSH (uIU/mL)   Date Value   07/15/2022 2.56   01/07/2022 5.15 (H)   02/25/2021 1.89     Abd xray 7/9/2020:  1. Interval development of a moderate left pleural effusion with left basilar  atelectasis. 2. Percutaneous gastrostomy tube. No complicating features noted. PET 10/22/2020:   No Foci of Abnormal Hypermetabolism. Assessment:   1) Squamous cell carcinoma of the right base of tongue  Stage I, p16+  She had biopsy proven disease in her base of tongue and right cervical node. 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   However, she was also a smoker and this may have driven her disease as well. She completed chemoradiation with weekly cisplatin in 7/2020, and she is now followed with surveillance. She has no evidence of recurrence at this time. We will continue with surveillance. She continues to follow with Dr. Tamela Buerger ([facility]) and Dr. Cornelia Bhakta (ENT). Surveillance after H&N cancer, per NCCN guidelines, is as follows:  Clinical evaluation 4-8 weeks post-treatment. If clinically looking good, obtain restaging PET at 12 weeks. Year 1: H&P every 1-3mo  Year 2: H&P every 2-6 mo  Year 3-5: H&P every 4-8 mo  Year 6+: H&P every 12 mo  TSH every 6-12 mo  Annual low-dose helical CT scanning of the lung for select patient  Imaging otherwise only as indicated  Smoking cessation, etoh counseling, speech/hearing/swallowing eval as indicated  Dental f/u    2) Dysphagia, Anorexia  Gaining some weight now. Eating much better. EGD and colonoscopy she reports were ok. 3) Hypothyroidism  Secondary to radiation. Her PCP is managing, now on synthroid. Will fu again on 2/16/2023    4) Pain at prior feeding tube site  Mild, intermittent. Likely due to scar tissue. 5) Dry mouth  Discussed Xylimelts over the counter. 6) Current active smoker  Discussed lung cancer screening to start at age 56. This can be done through PCP office. She is not interested in quitting. Plan:     Labs today: CBC, BMP, TSH  [facility] ([facility]/24/2023) as scheduled  Return to seem me in 6 months    I personally saw and evaluated the patient and performed the key components of medical decision making. The history, physical exam, and documentation were performed by Brendan Way NP. I reviewed and verified the above documentation and modified it as needed. No evidence of recurrence at this time, we will continue with surveillance.     Signed By: Ying Faulkner MD

## 2023-01-20 ENCOUNTER — OFFICE VISIT (OUTPATIENT)
Dept: ONCOLOGY | Age: 53
End: 2023-01-20
Payer: COMMERCIAL

## 2023-01-20 VITALS
WEIGHT: 104 LBS | HEART RATE: 53 BPM | OXYGEN SATURATION: 97 % | HEIGHT: 63 IN | DIASTOLIC BLOOD PRESSURE: 85 MMHG | RESPIRATION RATE: 16 BRPM | BODY MASS INDEX: 18.43 KG/M2 | SYSTOLIC BLOOD PRESSURE: 126 MMHG | TEMPERATURE: 97.4 F

## 2023-01-20 DIAGNOSIS — C01 SQUAMOUS CELL CARCINOMA OF BASE OF TONGUE (HCC): Primary | ICD-10-CM

## 2023-01-20 RX ORDER — CITALOPRAM 20 MG/1
TABLET, FILM COATED ORAL
COMMUNITY
Start: 2022-10-27

## 2023-02-17 ENCOUNTER — TRANSCRIBE ORDER (OUTPATIENT)
Dept: SCHEDULING | Age: 53
End: 2023-02-17

## 2023-02-17 DIAGNOSIS — F17.210 CIGARETTE SMOKER: Primary | ICD-10-CM

## 2023-04-03 ENCOUNTER — HOSPITAL ENCOUNTER (OUTPATIENT)
Dept: CT IMAGING | Age: 53
End: 2023-04-03
Attending: NURSE PRACTITIONER
Payer: COMMERCIAL

## 2023-04-03 DIAGNOSIS — F17.210 CIGARETTE SMOKER: ICD-10-CM

## 2023-04-03 PROCEDURE — 71271 CT THORAX LUNG CANCER SCR C-: CPT

## 2023-05-02 ENCOUNTER — HOSPITAL ENCOUNTER (OUTPATIENT)
Dept: RADIATION THERAPY | Age: 53
Discharge: HOME | End: 2023-05-02

## 2023-07-28 ENCOUNTER — OFFICE VISIT (OUTPATIENT)
Age: 53
End: 2023-07-28
Payer: COMMERCIAL

## 2023-07-28 VITALS
RESPIRATION RATE: 18 BRPM | HEIGHT: 63 IN | OXYGEN SATURATION: 96 % | HEART RATE: 78 BPM | WEIGHT: 108 LBS | BODY MASS INDEX: 19.14 KG/M2 | TEMPERATURE: 98.3 F | DIASTOLIC BLOOD PRESSURE: 72 MMHG | SYSTOLIC BLOOD PRESSURE: 103 MMHG

## 2023-07-28 DIAGNOSIS — C01 SQUAMOUS CELL CARCINOMA OF BASE OF TONGUE (HCC): Primary | ICD-10-CM

## 2023-07-28 PROCEDURE — 99214 OFFICE O/P EST MOD 30 MIN: CPT | Performed by: INTERNAL MEDICINE

## 2023-07-28 ASSESSMENT — PATIENT HEALTH QUESTIONNAIRE - PHQ9
SUM OF ALL RESPONSES TO PHQ QUESTIONS 1-9: 0
SUM OF ALL RESPONSES TO PHQ9 QUESTIONS 1 & 2: 0
1. LITTLE INTEREST OR PLEASURE IN DOING THINGS: 0
SUM OF ALL RESPONSES TO PHQ QUESTIONS 1-9: 0
SUM OF ALL RESPONSES TO PHQ QUESTIONS 1-9: 0
2. FEELING DOWN, DEPRESSED OR HOPELESS: 0
SUM OF ALL RESPONSES TO PHQ QUESTIONS 1-9: 0

## 2024-01-23 NOTE — PROGRESS NOTES
[facility] [place] at [facility]  [address] [address], [address] [facility] [address]  W: [phone]  F: [phone]      Reason for Visit:   Harmony Valentin is a 53 y.o. female who is seen today for evaluation of oropharyngeal cancer.    Hematology/Oncology Treatment History:   Biopsy of neck mass 3/25/2020: squamous cell carcinoma  PET/CT 4/7/2020: Hypermetabolic right level 2 cervical lymph node likely corresponds to the biopsy proven squamous cell carcinoma. Focal increased tracer activity at the right tongue base/right  vallecula is suspicious for primary malignancy. No evidence of distant metastatic disease.  Direct laryngoscopy by Dr. Padgett 4/13/2020: Friable tissue in right lateral tongue base without well-defined mass.  Biopsies of tongue base with invasive squamous cell carcinoma, moderately differentiated,  p16+.  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 Rowe on 6/8/2020, completed 7/30/2020  Initiated curative intent therapy with weekly Cisplatin on 6/9/2020, completed 7/30/2020    History of Present Illness:   No new medical problems since our last visit.  Eating and drinking ok. She continues with dry mouth, but this has improved.        Review of systems was obtained and pertinent findings reviewed above. Past medical history, social history, family history, medications, and allergies are located in the electronic medical record.    Physical Exam:   Visit Vitals  /74 (Site: Left Upper Arm, Position: Sitting, Cuff Size: Large Adult)   Pulse 63   Temp 97.4 °F (36.3 °C) (Temporal)   Resp 14   Ht 1.6 m (5' 3\")   Wt 50.3 kg (111 lb)   SpO2 98%   BMI 19.66 kg/m²       ECOG PS: 0  General: no distress  Eyes: anicteric sclerae  HENT: oropharynx clear  Neck: supple  Lymphatic: no cervical, supraclavicular, or inguinal adenopathy  Respiratory: normal respiratory effort  CV: no peripheral

## 2024-01-31 ENCOUNTER — OFFICE VISIT (OUTPATIENT)
Age: 54
End: 2024-01-31
Payer: COMMERCIAL

## 2024-01-31 VITALS
HEIGHT: 63 IN | OXYGEN SATURATION: 98 % | BODY MASS INDEX: 19.67 KG/M2 | DIASTOLIC BLOOD PRESSURE: 74 MMHG | SYSTOLIC BLOOD PRESSURE: 127 MMHG | HEART RATE: 63 BPM | TEMPERATURE: 97.4 F | WEIGHT: 111 LBS | RESPIRATION RATE: 14 BRPM

## 2024-01-31 DIAGNOSIS — C01 SQUAMOUS CELL CARCINOMA OF BASE OF TONGUE (HCC): Primary | ICD-10-CM

## 2024-01-31 DIAGNOSIS — C01 SQUAMOUS CELL CARCINOMA OF BASE OF TONGUE (HCC): ICD-10-CM

## 2024-01-31 LAB
ALBUMIN SERPL-MCNC: 4.2 G/DL (ref 3.5–5)
ALBUMIN/GLOB SERPL: 1.2 (ref 1.1–2.2)
ALP SERPL-CCNC: 89 U/L (ref 45–117)
ALT SERPL-CCNC: 23 U/L (ref 12–78)
ANION GAP SERPL CALC-SCNC: 0 MMOL/L (ref 5–15)
AST SERPL-CCNC: 20 U/L (ref 15–37)
BASOPHILS # BLD: 0 K/UL (ref 0–0.1)
BASOPHILS NFR BLD: 1 % (ref 0–1)
BILIRUB SERPL-MCNC: 0.6 MG/DL (ref 0.2–1)
BUN SERPL-MCNC: 14 MG/DL (ref 6–20)
BUN/CREAT SERPL: 17 (ref 12–20)
CALCIUM SERPL-MCNC: 9.7 MG/DL (ref 8.5–10.1)
CHLORIDE SERPL-SCNC: 107 MMOL/L (ref 97–108)
CO2 SERPL-SCNC: 33 MMOL/L (ref 21–32)
CREAT SERPL-MCNC: 0.83 MG/DL (ref 0.55–1.02)
DIFFERENTIAL METHOD BLD: NORMAL
EOSINOPHIL # BLD: 0 K/UL (ref 0–0.4)
EOSINOPHIL NFR BLD: 1 % (ref 0–7)
ERYTHROCYTE [DISTWIDTH] IN BLOOD BY AUTOMATED COUNT: 13.7 % (ref 11.5–14.5)
GLOBULIN SER CALC-MCNC: 3.4 G/DL (ref 2–4)
GLUCOSE SERPL-MCNC: 101 MG/DL (ref 65–100)
HCT VFR BLD AUTO: 44.2 % (ref 35–47)
HGB BLD-MCNC: 14.9 G/DL (ref 11.5–16)
IMM GRANULOCYTES # BLD AUTO: 0 K/UL (ref 0–0.04)
IMM GRANULOCYTES NFR BLD AUTO: 0 % (ref 0–0.5)
LYMPHOCYTES # BLD: 1.7 K/UL (ref 0.8–3.5)
LYMPHOCYTES NFR BLD: 29 % (ref 12–49)
MCH RBC QN AUTO: 30.3 PG (ref 26–34)
MCHC RBC AUTO-ENTMCNC: 33.7 G/DL (ref 30–36.5)
MCV RBC AUTO: 90 FL (ref 80–99)
MONOCYTES # BLD: 0.4 K/UL (ref 0–1)
MONOCYTES NFR BLD: 6 % (ref 5–13)
NEUTS SEG # BLD: 3.7 K/UL (ref 1.8–8)
NEUTS SEG NFR BLD: 63 % (ref 32–75)
NRBC # BLD: 0 K/UL (ref 0–0.01)
NRBC BLD-RTO: 0 PER 100 WBC
PLATELET # BLD AUTO: 306 K/UL (ref 150–400)
PMV BLD AUTO: 10.1 FL (ref 8.9–12.9)
POTASSIUM SERPL-SCNC: 4.5 MMOL/L (ref 3.5–5.1)
PROT SERPL-MCNC: 7.6 G/DL (ref 6.4–8.2)
RBC # BLD AUTO: 4.91 M/UL (ref 3.8–5.2)
SODIUM SERPL-SCNC: 140 MMOL/L (ref 136–145)
TSH SERPL DL<=0.05 MIU/L-ACNC: 1.55 UIU/ML (ref 0.36–3.74)
WBC # BLD AUTO: 5.8 K/UL (ref 3.6–11)

## 2024-01-31 PROCEDURE — G2211 COMPLEX E/M VISIT ADD ON: HCPCS | Performed by: INTERNAL MEDICINE

## 2024-01-31 PROCEDURE — 99214 OFFICE O/P EST MOD 30 MIN: CPT | Performed by: INTERNAL MEDICINE

## 2024-01-31 NOTE — PROGRESS NOTES
Harmony MALCOLM Valentin is a 53 y.o. female follow up for oropharyngeal cancer.      1. Have you been to the ER, urgent care clinic since your last visit?  Hospitalized since your last visit?no    2. Have you seen or consulted any other health care providers outside of the [facility] since your last visit?  Include any pap smears or colon screening. No

## 2024-08-02 ENCOUNTER — TELEPHONE (OUTPATIENT)
Age: 54
End: 2024-08-02

## 2024-08-02 NOTE — TELEPHONE ENCOUNTER
Patient called and stated that she woke up with flu like symptoms and wanted to cancel her appt. Patient stated that she will call back to reschedule.

## 2024-08-06 ENCOUNTER — TELEPHONE (OUTPATIENT)
Age: 54
End: 2024-08-06

## 2024-08-27 NOTE — PROGRESS NOTES
[facility] [place] at [facility]  [address] [address], [address] [facility] [address]  W: [phone]  F: [phone]      Reason for Visit:   Harmony Valentin is a 54 y.o. female who is seen today for evaluation of oropharyngeal cancer.    Hematology/Oncology Treatment History:   Biopsy of neck mass 3/25/2020: squamous cell carcinoma  PET/CT 4/7/2020: Hypermetabolic right level 2 cervical lymph node likely corresponds to the biopsy proven squamous cell carcinoma. Focal increased tracer activity at the right tongue base/right  vallecula is suspicious for primary malignancy. No evidence of distant metastatic disease.  Direct laryngoscopy by Dr. Padgett 4/13/2020: Friable tissue in right lateral tongue base without well-defined mass.  Biopsies of tongue base with invasive squamous cell carcinoma, moderately differentiated,  p16+.  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 Rowe on 6/8/2020, completed 7/30/2020  Initiated curative intent therapy with weekly Cisplatin on 6/9/2020, completed 7/30/2020    History of Present Illness:   She cancelled her appt in August due to COVID19 illness. She has recovered well from this. No other new medical issues. Swallowing ok. Mild chronic dry mouth, mostly with eating bread.    She continues to smoke.      Review of systems was obtained and pertinent findings reviewed above. Past medical history, social history, family history, medications, and allergies are located in the electronic medical record.    Physical Exam:   Visit Vitals  /74 (Site: Right Upper Arm, Position: Sitting, Cuff Size: Large Adult)   Pulse 54   Temp 97.9 °F (36.6 °C) (Temporal)   Resp 18   Ht 1.6 m (5' 3\")   Wt 53.5 kg (118 lb)   SpO2 100%   BMI 20.90 kg/m²     ECOG PS: 0  General: no distress  Eyes: anicteric sclerae  HENT: oropharynx clear  Neck: supple  Lymphatic: no cervical, supraclavicular, or

## 2024-09-04 ENCOUNTER — OFFICE VISIT (OUTPATIENT)
Age: 54
End: 2024-09-04
Payer: COMMERCIAL

## 2024-09-04 ENCOUNTER — HOSPITAL ENCOUNTER (OUTPATIENT)
Facility: HOSPITAL | Age: 54
Discharge: HOME | End: 2024-09-07

## 2024-09-04 VITALS
BODY MASS INDEX: 20.91 KG/M2 | DIASTOLIC BLOOD PRESSURE: 74 MMHG | OXYGEN SATURATION: 100 % | HEART RATE: 54 BPM | RESPIRATION RATE: 18 BRPM | TEMPERATURE: 97.9 F | SYSTOLIC BLOOD PRESSURE: 124 MMHG | WEIGHT: 118 LBS | HEIGHT: 63 IN

## 2024-09-04 VITALS — BODY MASS INDEX: 21.26 KG/M2 | DIASTOLIC BLOOD PRESSURE: 79 MMHG | WEIGHT: 120 LBS | SYSTOLIC BLOOD PRESSURE: 127 MMHG

## 2024-09-04 DIAGNOSIS — C01 SQUAMOUS CELL CARCINOMA OF BASE OF TONGUE (HCC): Primary | ICD-10-CM

## 2024-09-04 DIAGNOSIS — E03.9 HYPOTHYROIDISM, UNSPECIFIED TYPE: ICD-10-CM

## 2024-09-04 DIAGNOSIS — C01 SQUAMOUS CELL CARCINOMA OF BASE OF TONGUE (HCC): ICD-10-CM

## 2024-09-04 PROCEDURE — 99214 OFFICE O/P EST MOD 30 MIN: CPT | Performed by: INTERNAL MEDICINE

## 2024-09-04 PROCEDURE — G2211 COMPLEX E/M VISIT ADD ON: HCPCS | Performed by: INTERNAL MEDICINE

## 2024-09-04 ASSESSMENT — PAIN SCALES - GENERAL: PAINLEVEL_OUTOF10: 0

## 2024-09-04 NOTE — PROGRESS NOTES
Harmony Valentin is a 54 y.o. female follow up for oropharyngeal cancer.      1. Have you been to the ER, urgent care clinic since your last visit?  Hospitalized since your last visit?no    2. Have you seen or consulted any other health care providers outside of the [facility] since your last visit?  Include any pap smears or colon screening. no

## 2024-09-04 NOTE — PROGRESS NOTES
[facility] [place] at [facility]  [facility]      RADIATION ONCOLOGY FOLLOW-UP VISIT NOTE     Encounter Date: 09/04/24   Patient Name: Harmony Valentin  YOB: 1970  Medical Record Number: [id number]  Referring Physician: Dmitry Padgett MD  Primary Care Provider: Candy Noble APRN - NP  ENT: Dmitry Padgett MD  Med onc: Matias Khan MD    DIAGNOSIS:       ICD-10-CM    1. Squamous cell carcinoma of base of tongue (HCC)  C01         STAGING:    Cancer Staging   Squamous cell carcinoma of base of tongue (HCC)  Staging form: Pharynx - HPV-Mediated Oropharynx, AJCC 8th Edition  - Clinical stage from 4/13/2020: Stage I (cT1, cN1, cM0, p16+) - Signed by Scot Rowe MD on 9/4/2024  AJCC Staging has been reviewed      ONCOLOGIC HISTORY:   RIGHT BASE OF TONGUE CANCER HISTORY:  2/2020 - patient initially notices a RIGHT neck mass with associated right ear muffled/fullness sensation.  A trial of antibiotics did not provide improvement.  3/25/2020 - seen by ENT (Dr. Padgett) who notes a 2cm RIGHT level 2 lymph node.  Flexible nasolaryngoscopy showed a benign appearing submucosal cyst between right tongue base and epiglottis.  Biopsy of the Right level 2 node showed:       - Squamous cell carcinoma       - Insufficient tissue for p16 staining  4/7/2020 - PET/CT ([facility]) shows increased FDG avidity in the RIGHT level 2 node (SUV 5.7) and the RIGHT tongue base/right vallecula (SUV 13.6).  No other areas of FDG avidity.  4/13/2020 - Direct laryngoscopy and right tongue base biopsies show:       - Op findings:  friable tissue in right tongue base without definitive mass       - Squamous cell carcinoma, moderately differentiated       - P16+  6/8/2020 - 7/30/2020 - completes definitive chemoradiation to right BOT and bilateral neck (7000cGy to gross tumor, 6300cGy to high risk regions, 5600cGy to low risk regions).  10/22/2020 - 12 week post-tx PET/CT

## 2024-09-05 LAB
ALBUMIN SERPL-MCNC: 4.2 G/DL (ref 3.5–5)
ALBUMIN/GLOB SERPL: 1.3 (ref 1.1–2.2)
ALP SERPL-CCNC: 102 U/L (ref 45–117)
ALT SERPL-CCNC: 21 U/L (ref 12–78)
ANION GAP SERPL CALC-SCNC: 3 MMOL/L (ref 5–15)
AST SERPL-CCNC: 22 U/L (ref 15–37)
BILIRUB DIRECT SERPL-MCNC: <0.1 MG/DL (ref 0–0.2)
BILIRUB SERPL-MCNC: 0.4 MG/DL (ref 0.2–1)
BUN SERPL-MCNC: 15 MG/DL (ref 6–20)
BUN/CREAT SERPL: 20 (ref 12–20)
CALCIUM SERPL-MCNC: 10 MG/DL (ref 8.5–10.1)
CHLORIDE SERPL-SCNC: 107 MMOL/L (ref 97–108)
CO2 SERPL-SCNC: 28 MMOL/L (ref 21–32)
CREAT SERPL-MCNC: 0.75 MG/DL (ref 0.55–1.02)
GLOBULIN SER CALC-MCNC: 3.2 G/DL (ref 2–4)
GLUCOSE SERPL-MCNC: 95 MG/DL (ref 65–100)
POTASSIUM SERPL-SCNC: 5 MMOL/L (ref 3.5–5.1)
PROT SERPL-MCNC: 7.4 G/DL (ref 6.4–8.2)
SODIUM SERPL-SCNC: 138 MMOL/L (ref 136–145)
TSH SERPL DL<=0.05 MIU/L-ACNC: 1.64 UIU/ML (ref 0.36–3.74)

## 2025-02-25 ENCOUNTER — OFFICE VISIT (OUTPATIENT)
Age: 55
End: 2025-02-25
Payer: COMMERCIAL

## 2025-02-25 ENCOUNTER — APPOINTMENT (OUTPATIENT)
Facility: HOSPITAL | Age: 55
End: 2025-02-25
Payer: COMMERCIAL

## 2025-02-25 ENCOUNTER — HOSPITAL ENCOUNTER (EMERGENCY)
Facility: HOSPITAL | Age: 55
Discharge: HOME | End: 2025-02-25
Attending: STUDENT IN AN ORGANIZED HEALTH CARE EDUCATION/TRAINING PROGRAM
Payer: COMMERCIAL

## 2025-02-25 VITALS
DIASTOLIC BLOOD PRESSURE: 81 MMHG | WEIGHT: 120 LBS | HEIGHT: 63 IN | TEMPERATURE: 98.2 F | SYSTOLIC BLOOD PRESSURE: 124 MMHG | RESPIRATION RATE: 15 BRPM | BODY MASS INDEX: 21.26 KG/M2 | HEART RATE: 73 BPM | OXYGEN SATURATION: 98 %

## 2025-02-25 VITALS
HEART RATE: 79 BPM | TEMPERATURE: 98.1 F | DIASTOLIC BLOOD PRESSURE: 83 MMHG | SYSTOLIC BLOOD PRESSURE: 126 MMHG | BODY MASS INDEX: 21.09 KG/M2 | WEIGHT: 119 LBS | OXYGEN SATURATION: 96 % | RESPIRATION RATE: 20 BRPM | HEIGHT: 63 IN

## 2025-02-25 DIAGNOSIS — C01 SQUAMOUS CELL CARCINOMA OF BASE OF TONGUE (HCC): Primary | ICD-10-CM

## 2025-02-25 DIAGNOSIS — F17.200 CURRENT SMOKER: ICD-10-CM

## 2025-02-25 DIAGNOSIS — S09.90XA INJURY OF HEAD, INITIAL ENCOUNTER: ICD-10-CM

## 2025-02-25 DIAGNOSIS — R55 SYNCOPE, UNSPECIFIED SYNCOPE TYPE: Primary | ICD-10-CM

## 2025-02-25 DIAGNOSIS — E03.9 HYPOTHYROIDISM, UNSPECIFIED TYPE: ICD-10-CM

## 2025-02-25 DIAGNOSIS — J10.1 INFLUENZA A: ICD-10-CM

## 2025-02-25 LAB
ALBUMIN SERPL-MCNC: 3.5 G/DL (ref 3.5–5)
ALBUMIN/GLOB SERPL: 1 (ref 1.1–2.2)
ALP SERPL-CCNC: 79 U/L (ref 45–117)
ALT SERPL-CCNC: 22 U/L (ref 12–78)
ANION GAP SERPL CALC-SCNC: 4 MMOL/L (ref 2–12)
AST SERPL-CCNC: 27 U/L (ref 15–37)
BASOPHILS # BLD: 0.02 K/UL (ref 0–0.1)
BASOPHILS NFR BLD: 0.5 % (ref 0–1)
BILIRUB SERPL-MCNC: 0.2 MG/DL (ref 0.2–1)
BUN SERPL-MCNC: 15 MG/DL (ref 6–20)
BUN/CREAT SERPL: 18 (ref 12–20)
CALCIUM SERPL-MCNC: 9 MG/DL (ref 8.5–10.1)
CHLORIDE SERPL-SCNC: 106 MMOL/L (ref 97–108)
CO2 SERPL-SCNC: 27 MMOL/L (ref 21–32)
CREAT SERPL-MCNC: 0.82 MG/DL (ref 0.55–1.02)
DIFFERENTIAL METHOD BLD: NORMAL
EKG ATRIAL RATE: 66 BPM
EKG DIAGNOSIS: NORMAL
EKG P AXIS: 70 DEGREES
EKG P-R INTERVAL: 160 MS
EKG Q-T INTERVAL: 410 MS
EKG QRS DURATION: 74 MS
EKG QTC CALCULATION (BAZETT): 429 MS
EKG R AXIS: 68 DEGREES
EKG T AXIS: 48 DEGREES
EKG VENTRICULAR RATE: 66 BPM
EOSINOPHIL # BLD: 0.02 K/UL (ref 0–0.4)
EOSINOPHIL NFR BLD: 0.5 % (ref 0–7)
ERYTHROCYTE [DISTWIDTH] IN BLOOD BY AUTOMATED COUNT: 13.9 % (ref 11.5–14.5)
FLUAV RNA SPEC QL NAA+PROBE: DETECTED
FLUBV RNA SPEC QL NAA+PROBE: NOT DETECTED
GLOBULIN SER CALC-MCNC: 3.5 G/DL (ref 2–4)
GLUCOSE SERPL-MCNC: 89 MG/DL (ref 65–100)
HCT VFR BLD AUTO: 40.2 % (ref 35–47)
HGB BLD-MCNC: 13.2 G/DL (ref 11.5–16)
IMM GRANULOCYTES # BLD AUTO: 0 K/UL (ref 0–0.04)
IMM GRANULOCYTES NFR BLD AUTO: 0 % (ref 0–0.5)
LYMPHOCYTES # BLD: 1.28 K/UL (ref 0.8–3.5)
LYMPHOCYTES NFR BLD: 32.4 % (ref 12–49)
MAGNESIUM SERPL-MCNC: 2 MG/DL (ref 1.6–2.4)
MCH RBC QN AUTO: 28.9 PG (ref 26–34)
MCHC RBC AUTO-ENTMCNC: 32.8 G/DL (ref 30–36.5)
MCV RBC AUTO: 88.2 FL (ref 80–99)
MONOCYTES # BLD: 0.42 K/UL (ref 0–1)
MONOCYTES NFR BLD: 10.6 % (ref 5–13)
NEUTS SEG # BLD: 2.21 K/UL (ref 1.8–8)
NEUTS SEG NFR BLD: 56 % (ref 32–75)
NRBC # BLD: 0 K/UL (ref 0–0.01)
NRBC BLD-RTO: 0 PER 100 WBC
PLATELET # BLD AUTO: 225 K/UL (ref 150–400)
PMV BLD AUTO: 9.5 FL (ref 8.9–12.9)
POTASSIUM SERPL-SCNC: 3.7 MMOL/L (ref 3.5–5.1)
PROT SERPL-MCNC: 7 G/DL (ref 6.4–8.2)
RBC # BLD AUTO: 4.56 M/UL (ref 3.8–5.2)
SARS-COV-2 RNA RESP QL NAA+PROBE: NOT DETECTED
SODIUM SERPL-SCNC: 137 MMOL/L (ref 136–145)
SOURCE: ABNORMAL
TROPONIN I SERPL HS-MCNC: 5 NG/L (ref 0–51)
WBC # BLD AUTO: 4 K/UL (ref 3.6–11)

## 2025-02-25 PROCEDURE — 96374 THER/PROPH/DIAG INJ IV PUSH: CPT

## 2025-02-25 PROCEDURE — 80053 COMPREHEN METABOLIC PANEL: CPT

## 2025-02-25 PROCEDURE — 85025 COMPLETE CBC W/AUTO DIFF WBC: CPT

## 2025-02-25 PROCEDURE — 83735 ASSAY OF MAGNESIUM: CPT

## 2025-02-25 PROCEDURE — 93005 ELECTROCARDIOGRAM TRACING: CPT | Performed by: STUDENT IN AN ORGANIZED HEALTH CARE EDUCATION/TRAINING PROGRAM

## 2025-02-25 PROCEDURE — G2211 COMPLEX E/M VISIT ADD ON: HCPCS | Performed by: NURSE PRACTITIONER

## 2025-02-25 PROCEDURE — 99214 OFFICE O/P EST MOD 30 MIN: CPT | Performed by: NURSE PRACTITIONER

## 2025-02-25 PROCEDURE — 99284 EMERGENCY DEPT VISIT MOD MDM: CPT

## 2025-02-25 PROCEDURE — 70450 CT HEAD/BRAIN W/O DYE: CPT

## 2025-02-25 PROCEDURE — 6360000002 HC RX W HCPCS: Performed by: STUDENT IN AN ORGANIZED HEALTH CARE EDUCATION/TRAINING PROGRAM

## 2025-02-25 PROCEDURE — 84484 ASSAY OF TROPONIN QUANT: CPT

## 2025-02-25 PROCEDURE — 93010 ELECTROCARDIOGRAM REPORT: CPT | Performed by: STUDENT IN AN ORGANIZED HEALTH CARE EDUCATION/TRAINING PROGRAM

## 2025-02-25 PROCEDURE — 36415 COLL VENOUS BLD VENIPUNCTURE: CPT

## 2025-02-25 PROCEDURE — 96375 TX/PRO/DX INJ NEW DRUG ADDON: CPT

## 2025-02-25 PROCEDURE — 72125 CT NECK SPINE W/O DYE: CPT

## 2025-02-25 PROCEDURE — 87636 SARSCOV2 & INF A&B AMP PRB: CPT

## 2025-02-25 PROCEDURE — 2580000003 HC RX 258: Performed by: STUDENT IN AN ORGANIZED HEALTH CARE EDUCATION/TRAINING PROGRAM

## 2025-02-25 RX ORDER — DIPHENHYDRAMINE HYDROCHLORIDE 50 MG/ML
12.5 INJECTION INTRAMUSCULAR; INTRAVENOUS
Status: COMPLETED | OUTPATIENT
Start: 2025-02-25 | End: 2025-02-25

## 2025-02-25 RX ORDER — 0.9 % SODIUM CHLORIDE 0.9 %
1000 INTRAVENOUS SOLUTION INTRAVENOUS ONCE
Status: COMPLETED | OUTPATIENT
Start: 2025-02-25 | End: 2025-02-25

## 2025-02-25 RX ORDER — ONDANSETRON 4 MG/1
4 TABLET, ORALLY DISINTEGRATING ORAL 3 TIMES DAILY PRN
Qty: 21 TABLET | Refills: 0 | Status: SHIPPED | OUTPATIENT
Start: 2025-02-25

## 2025-02-25 RX ORDER — PROCHLORPERAZINE EDISYLATE 5 MG/ML
10 INJECTION INTRAMUSCULAR; INTRAVENOUS ONCE
Status: COMPLETED | OUTPATIENT
Start: 2025-02-25 | End: 2025-02-25

## 2025-02-25 RX ADMIN — PROCHLORPERAZINE EDISYLATE 10 MG: 5 INJECTION INTRAMUSCULAR; INTRAVENOUS at 12:21

## 2025-02-25 RX ADMIN — DIPHENHYDRAMINE HYDROCHLORIDE 12.5 MG: 50 INJECTION INTRAMUSCULAR; INTRAVENOUS at 12:21

## 2025-02-25 RX ADMIN — SODIUM CHLORIDE 1000 ML: 0.9 INJECTION, SOLUTION INTRAVENOUS at 12:20

## 2025-02-25 ASSESSMENT — ENCOUNTER SYMPTOMS
NAUSEA: 1
COUGH: 1
VOMITING: 1
SHORTNESS OF BREATH: 0

## 2025-02-25 ASSESSMENT — PAIN SCALES - GENERAL: PAINLEVEL_OUTOF10: 6

## 2025-02-25 ASSESSMENT — PAIN - FUNCTIONAL ASSESSMENT
PAIN_FUNCTIONAL_ASSESSMENT: 0-10
PAIN_FUNCTIONAL_ASSESSMENT: 0-10

## 2025-02-25 ASSESSMENT — LIFESTYLE VARIABLES
HOW MANY STANDARD DRINKS CONTAINING ALCOHOL DO YOU HAVE ON A TYPICAL DAY: 1 OR 2
HOW OFTEN DO YOU HAVE A DRINK CONTAINING ALCOHOL: 2-4 TIMES A MONTH

## 2025-02-25 ASSESSMENT — PAIN DESCRIPTION - LOCATION
LOCATION: HEAD
LOCATION: HEAD

## 2025-02-25 NOTE — ED PROVIDER NOTES
[facility] EMERGENCY DEPARTMENT  EMERGENCY DEPARTMENT ENCOUNTER      Pt Name: Harmony Valentin  MRN: [id number]  Birthdate 1970  Date of evaluation: 2/25/2025  Provider: Jazmyne Meraz DO    CHIEF COMPLAINT       Syncope      HISTORY OF PRESENT ILLNESS    HPI    Harmony Valentin is a 55 y.o. female with a previous history of squamous cell carcinoma of the tongue status post chemo and radiation, hypothyroidism who presents to the emergency department for evaluation of head injury.  Patient reports 3 and Monday morning she was letting her dogs out when she became lightheaded and subsequently passed out.  No preceding chest pain, shortness of breath, palpitations.  Reports she fell backwards and struck the right part of her head on a bench.  Since then she has been having ongoing headaches, nausea and vomiting.  States headaches will resolve with taking over-the-counter pain medication but headache will then return.  She has also had upper respiratory illness for the last week with fatigue, nasal congestion and cough as well as decreased appetite.    Nursing Notes were reviewed.    REVIEW OF SYSTEMS       Review of Systems   Constitutional:  Positive for fatigue. Negative for fever.   HENT:  Positive for congestion.    Respiratory:  Positive for cough. Negative for shortness of breath.    Cardiovascular:  Negative for chest pain and palpitations.   Gastrointestinal:  Positive for nausea and vomiting.   Neurological:  Positive for headaches.           PAST MEDICAL HISTORY     Past Medical History:   Diagnosis Date    Anxiety     Cancer (HCC)     rt side neck lymph node    Dysphagia     has PEG tube (placed summer 2020)    Fainting spell 10 years ago    unknown etiology    Heart murmur     Pleural effusion, left     Tongue carcinoma (HCC)     Squamous cell - treated with chemo and radiation (completed 7/2020 with Dr. Khan)         SURGICAL HISTORY       Past Surgical History:

## 2025-02-25 NOTE — ED TRIAGE NOTES
Pt ambulatory to ED for syncopal episode. Pt states at 0300 on Monday morning she was letting dogs out when became lightheaded and \"did not feel right\". Reports passing out and falling backwards and striking back of head on bench.  Denies blood thinners.     Pt reports headaches, N/V, fatigue and poor appetite.

## 2025-02-25 NOTE — PROGRESS NOTES
Harmony MALCOLM Homero is a 55 y.o. female follow up for oropharyngeal cancer.        1. Have you been to the ER, urgent care clinic since your last visit?  Hospitalized since your last visit?{no    2. Have you seen or consulted any other health care providers outside of the [facility] since your last visit?  Include any pap smears or colon screening. no  
However, she was also a smoker and this may have driven her disease as well. She completed chemoradiation with weekly cisplatin in 7/2020. She is at risk of recurrence and is followed with surveillance.     She has no evidence of recurrence at this time.  We will continue with surveillance.     She is no longer following with Dr. Rowe. Note reviewed from appointment with Dr. Padgett (ENT) 6/2024, planned fu in 6-7 months. She reported right sided hearing loss, but this has been a symptom since childhood and not new. Dr. Padgett to follow. She had an appointment with him, but had to cancel due to not feeling well, she will call back to set this up.      Recommended surveillance after chemoradiation for Head and Neck Cancer (NCCN v4.2024):  PET/CT 3-6 months after radiation completed  Year 1: H&P every 1-3mo  Year 2: H&P every 2-6 mo  Year 3-5: H&P every 4-8 mo  Year 6+: H&P every 12 mo  TSH every 6-12 mo  Annual low-dose helical CT scanning of the lung for select patients  Dental follow up  Smoking cessation and etoh counseling, as indicated  Speech/hearing and swallowing evaluation and rehabilitation, as indicated  Lymphedema evaluation and rehabilitation, as indicated    The patient will continue to be seen long-term as part of ongoing care related to her serious or complex medical condition.      Dysphagia, Anorexia  Gaining some weight.  Eating much better.       Hypothyroidism  Secondary to radiation.  Her PCP is managing, now on synthroid. Last TSH in our system 9/2024       Dry mouth  Chronic, stable.  Secondary to prior radiation. Did not find relief from Xylimelts.       Current active smoker  Had CT screening done 4/2023, past due for another imaging study. She agrees to have set up with PCP. Offered to schedule here, but she declined. Given flyer for free smoking cessation classes that are upcoming virtually.       Fall  Passed out and hit her head yesterday. Has HA today, she was directed to ED by PCP,

## 2025-08-29 ENCOUNTER — OFFICE VISIT (OUTPATIENT)
Age: 55
End: 2025-08-29
Payer: COMMERCIAL

## 2025-08-29 VITALS
HEIGHT: 60 IN | RESPIRATION RATE: 20 BRPM | OXYGEN SATURATION: 98 % | WEIGHT: 112 LBS | BODY MASS INDEX: 21.99 KG/M2 | HEART RATE: 66 BPM | DIASTOLIC BLOOD PRESSURE: 83 MMHG | SYSTOLIC BLOOD PRESSURE: 135 MMHG | TEMPERATURE: 97.2 F

## 2025-08-29 DIAGNOSIS — C01 SQUAMOUS CELL CARCINOMA OF BASE OF TONGUE (HCC): Primary | ICD-10-CM

## 2025-08-29 DIAGNOSIS — C01 SQUAMOUS CELL CARCINOMA OF BASE OF TONGUE (HCC): ICD-10-CM

## 2025-08-29 LAB
ALBUMIN SERPL-MCNC: 4.1 G/DL (ref 3.5–5.2)
ALBUMIN/GLOB SERPL: 1.3 (ref 1.1–2.2)
ALP SERPL-CCNC: 92 U/L (ref 35–104)
ALT SERPL-CCNC: 18 U/L (ref 10–35)
ANION GAP SERPL CALC-SCNC: 10 MMOL/L (ref 2–14)
AST SERPL-CCNC: 28 U/L (ref 10–35)
BASOPHILS # BLD: 0.04 K/UL (ref 0–0.1)
BASOPHILS NFR BLD: 0.6 % (ref 0–1)
BILIRUB DIRECT SERPL-MCNC: 0.1 MG/DL (ref 0.1–0.3)
BILIRUB SERPL-MCNC: 0.4 MG/DL (ref 0–1.2)
BUN SERPL-MCNC: 14 MG/DL (ref 6–20)
BUN/CREAT SERPL: 19 (ref 12–20)
CALCIUM SERPL-MCNC: 10.3 MG/DL (ref 8.6–10)
CHLORIDE SERPL-SCNC: 103 MMOL/L (ref 98–107)
CO2 SERPL-SCNC: 27 MMOL/L (ref 20–29)
CREAT SERPL-MCNC: 0.75 MG/DL (ref 0.6–1)
DIFFERENTIAL METHOD BLD: NORMAL
EOSINOPHIL # BLD: 0.08 K/UL (ref 0–0.4)
EOSINOPHIL NFR BLD: 1.1 % (ref 0–7)
ERYTHROCYTE [DISTWIDTH] IN BLOOD BY AUTOMATED COUNT: 14.4 % (ref 11.5–14.5)
GLOBULIN SER CALC-MCNC: 3.1 G/DL (ref 2–4)
GLUCOSE SERPL-MCNC: 119 MG/DL (ref 65–100)
HCT VFR BLD AUTO: 44.1 % (ref 35–47)
HGB BLD-MCNC: 14.2 G/DL (ref 11.5–16)
IMM GRANULOCYTES # BLD AUTO: 0.01 K/UL (ref 0–0.04)
IMM GRANULOCYTES NFR BLD AUTO: 0.1 % (ref 0–0.5)
LYMPHOCYTES # BLD: 2.22 K/UL (ref 0.8–3.5)
LYMPHOCYTES NFR BLD: 30.7 % (ref 12–49)
MCH RBC QN AUTO: 29.3 PG (ref 26–34)
MCHC RBC AUTO-ENTMCNC: 32.2 G/DL (ref 30–36.5)
MCV RBC AUTO: 90.9 FL (ref 80–99)
MONOCYTES # BLD: 0.49 K/UL (ref 0–1)
MONOCYTES NFR BLD: 6.8 % (ref 5–13)
NEUTS SEG # BLD: 4.38 K/UL (ref 1.8–8)
NEUTS SEG NFR BLD: 60.7 % (ref 32–75)
NRBC # BLD: 0 K/UL (ref 0–0.01)
NRBC BLD-RTO: 0 PER 100 WBC
PLATELET # BLD AUTO: 317 K/UL (ref 150–400)
PMV BLD AUTO: 10.3 FL (ref 8.9–12.9)
POTASSIUM SERPL-SCNC: 5.5 MMOL/L (ref 3.5–5.1)
PROT SERPL-MCNC: 7.2 G/DL (ref 6.4–8.3)
RBC # BLD AUTO: 4.85 M/UL (ref 3.8–5.2)
SODIUM SERPL-SCNC: 140 MMOL/L (ref 136–145)
WBC # BLD AUTO: 7.2 K/UL (ref 3.6–11)

## 2025-08-29 PROCEDURE — 99214 OFFICE O/P EST MOD 30 MIN: CPT | Performed by: INTERNAL MEDICINE

## (undated) DEVICE — STERILE POLYISOPRENE POWDER-FREE SURGICAL GLOVES: Brand: PROTEXIS

## (undated) DEVICE — JELLY,LUBE,STERILE,FLIP TOP,TUBE,4-OZ: Brand: MEDLINE

## (undated) DEVICE — BITEBLOCK ENDOSCP 60FR MAXI WHT POLYETH STURDY W/ VELC WVN

## (undated) DEVICE — CANISTER, RIGID, 3000CC: Brand: MEDLINE INDUSTRIES, INC.

## (undated) DEVICE — KIT COLON W/ 1.1OZ LUB AND 2 END

## (undated) DEVICE — PAD NON-ADHERENT 3X4 STRL LF --

## (undated) DEVICE — DRAPE,REIN 53X77,STERILE: Brand: MEDLINE

## (undated) DEVICE — SOLUTION IRRIG 1000ML H2O STRL BLT

## (undated) DEVICE — 1200 GUARD II KIT W/5MM TUBE W/O VAC TUBE: Brand: GUARDIAN

## (undated) DEVICE — BAG BELONG PT PERS CLEAR HANDL

## (undated) DEVICE — MARKER,SKIN,WI/RULER AND LABELS: Brand: MEDLINE

## (undated) DEVICE — STRAP,POSITIONING,KNEE/BODY,FOAM,4X60": Brand: MEDLINE

## (undated) DEVICE — COVER LT HNDL PLAS RIG 1 PER PK

## (undated) DEVICE — GUARD TEETH AD NYL FOR LARYNSCP POS PROTCT

## (undated) DEVICE — TOWEL SURG W17XL27IN STD BLU COT NONFENESTRATED PREWASHED

## (undated) DEVICE — NEEDLE HYPO 18GA L1.5IN PNK S STL HUB POLYPR SHLD REG BVL

## (undated) DEVICE — Device

## (undated) DEVICE — CATH IV AUTOGRD BC PNK 20GA 25 -- INSYTE

## (undated) DEVICE — CUFF BLD PRSS AD SZ 11 FOR 25-34CM 1 TB TRIPURPOSE CONN

## (undated) DEVICE — KIT,ANTI FOG,W/SPONGE & FLUID,SOFT PACK: Brand: MEDLINE

## (undated) DEVICE — YANKAUER,BULB TIP,W/O VENT,RIGID,STERILE: Brand: MEDLINE

## (undated) DEVICE — SOLIDIFIER MEDC 1200ML -- CONVERT TO 356117

## (undated) DEVICE — CANNULA CUSH AD W/ 14FT TBG

## (undated) DEVICE — INFECTION CONTROL KIT SYS

## (undated) DEVICE — TUBING, SUCTION, 1/4" X 12', STRAIGHT: Brand: MEDLINE

## (undated) DEVICE — ENDOVIVE SAFETY PEG KIT

## (undated) DEVICE — ELECTRODE,RADIOTRANSLUCENT,FOAM,3PK: Brand: MEDLINE

## (undated) DEVICE — STERILE LATEX POWDER-FREE SURGICAL GLOVESWITH NITRILE COATING: Brand: PROTEXIS

## (undated) DEVICE — SPONGE GZ W4XL4IN COT RADPQ HIGHLY ABSRB

## (undated) DEVICE — ADULT SPO2 SENSOR: Brand: NELLCOR

## (undated) DEVICE — PAD,ABDOMINAL,5"X9",ST,LF,25/BX: Brand: MEDLINE INDUSTRIES, INC.

## (undated) DEVICE — 3M™ CUROS™ DISINFECTING CAP FOR NEEDLELESS CONNECTORS 270/CARTON 20 CARTONS/CASE CFF1-270: Brand: CUROS™